# Patient Record
Sex: MALE | Race: WHITE | Employment: OTHER | URBAN - METROPOLITAN AREA
[De-identification: names, ages, dates, MRNs, and addresses within clinical notes are randomized per-mention and may not be internally consistent; named-entity substitution may affect disease eponyms.]

---

## 2017-03-13 ENCOUNTER — TELEPHONE (OUTPATIENT)
Dept: INTERNAL MEDICINE CLINIC | Age: 62
End: 2017-03-13

## 2017-03-14 ENCOUNTER — OFFICE VISIT (OUTPATIENT)
Dept: INTERNAL MEDICINE CLINIC | Age: 62
End: 2017-03-14

## 2017-03-14 VITALS
WEIGHT: 199.8 LBS | BODY MASS INDEX: 28.67 KG/M2 | DIASTOLIC BLOOD PRESSURE: 78 MMHG | HEART RATE: 78 BPM | SYSTOLIC BLOOD PRESSURE: 122 MMHG

## 2017-03-14 DIAGNOSIS — B20 HIV DISEASE (HCC): ICD-10-CM

## 2017-03-14 DIAGNOSIS — J44.9 CHRONIC OBSTRUCTIVE PULMONARY DISEASE, UNSPECIFIED COPD TYPE (HCC): ICD-10-CM

## 2017-03-14 DIAGNOSIS — G63 NEUROPATHY DUE TO HIV (HCC): Primary | ICD-10-CM

## 2017-03-14 DIAGNOSIS — B20 NEUROPATHY DUE TO HIV (HCC): Primary | ICD-10-CM

## 2017-03-14 PROCEDURE — 99213 OFFICE O/P EST LOW 20 MIN: CPT | Performed by: INTERNAL MEDICINE

## 2017-03-14 ASSESSMENT — ENCOUNTER SYMPTOMS
CONSTIPATION: 0
COUGH: 0
DIARRHEA: 0
EYE PAIN: 0
SHORTNESS OF BREATH: 0
BACK PAIN: 0
WHEEZING: 0
SORE THROAT: 0
ABDOMINAL PAIN: 0

## 2017-05-09 ENCOUNTER — OFFICE VISIT (OUTPATIENT)
Dept: FAMILY MEDICINE CLINIC | Age: 62
End: 2017-05-09

## 2017-05-09 VITALS
WEIGHT: 181.6 LBS | HEART RATE: 100 BPM | SYSTOLIC BLOOD PRESSURE: 128 MMHG | HEIGHT: 67 IN | OXYGEN SATURATION: 98 % | BODY MASS INDEX: 28.5 KG/M2 | DIASTOLIC BLOOD PRESSURE: 78 MMHG

## 2017-05-09 DIAGNOSIS — R44.0 AUDITORY HALLUCINATIONS: Primary | ICD-10-CM

## 2017-05-09 DIAGNOSIS — Z13.31 POSITIVE DEPRESSION SCREENING: ICD-10-CM

## 2017-05-09 DIAGNOSIS — R45.851 SUICIDAL THOUGHTS: ICD-10-CM

## 2017-05-09 PROCEDURE — G0444 DEPRESSION SCREEN ANNUAL: HCPCS | Performed by: FAMILY MEDICINE

## 2017-05-09 PROCEDURE — 99214 OFFICE O/P EST MOD 30 MIN: CPT | Performed by: FAMILY MEDICINE

## 2017-05-09 PROCEDURE — G8431 POS CLIN DEPRES SCRN F/U DOC: HCPCS | Performed by: FAMILY MEDICINE

## 2017-05-09 ASSESSMENT — PATIENT HEALTH QUESTIONNAIRE - PHQ9
4. FEELING TIRED OR HAVING LITTLE ENERGY: 3
1. LITTLE INTEREST OR PLEASURE IN DOING THINGS: 0
6. FEELING BAD ABOUT YOURSELF - OR THAT YOU ARE A FAILURE OR HAVE LET YOURSELF OR YOUR FAMILY DOWN: 3
SUM OF ALL RESPONSES TO PHQ9 QUESTIONS 1 & 2: 3
SUM OF ALL RESPONSES TO PHQ QUESTIONS 1-9: 17
7. TROUBLE CONCENTRATING ON THINGS, SUCH AS READING THE NEWSPAPER OR WATCHING TELEVISION: 0
3. TROUBLE FALLING OR STAYING ASLEEP: 3
10. IF YOU CHECKED OFF ANY PROBLEMS, HOW DIFFICULT HAVE THESE PROBLEMS MADE IT FOR YOU TO DO YOUR WORK, TAKE CARE OF THINGS AT HOME, OR GET ALONG WITH OTHER PEOPLE: 2
2. FEELING DOWN, DEPRESSED OR HOPELESS: 3
5. POOR APPETITE OR OVEREATING: 3
8. MOVING OR SPEAKING SO SLOWLY THAT OTHER PEOPLE COULD HAVE NOTICED. OR THE OPPOSITE, BEING SO FIGETY OR RESTLESS THAT YOU HAVE BEEN MOVING AROUND A LOT MORE THAN USUAL: 0
9. THOUGHTS THAT YOU WOULD BE BETTER OFF DEAD, OR OF HURTING YOURSELF: 2

## 2017-05-10 ASSESSMENT — ENCOUNTER SYMPTOMS
SHORTNESS OF BREATH: 0
SINUS PRESSURE: 0
SORE THROAT: 0
EYE DISCHARGE: 0
COUGH: 0
NAUSEA: 0
BLOOD IN STOOL: 0
VOMITING: 0
DIARRHEA: 0
BACK PAIN: 0

## 2017-05-24 ENCOUNTER — OFFICE VISIT (OUTPATIENT)
Dept: INTERNAL MEDICINE CLINIC | Age: 62
End: 2017-05-24

## 2017-05-24 VITALS
SYSTOLIC BLOOD PRESSURE: 122 MMHG | RESPIRATION RATE: 16 BRPM | OXYGEN SATURATION: 99 % | WEIGHT: 191 LBS | BODY MASS INDEX: 30.83 KG/M2 | DIASTOLIC BLOOD PRESSURE: 78 MMHG | HEART RATE: 92 BPM

## 2017-05-24 DIAGNOSIS — F33.41 RECURRENT MAJOR DEPRESSIVE DISORDER, IN PARTIAL REMISSION (HCC): Primary | ICD-10-CM

## 2017-05-24 DIAGNOSIS — G62.9 NEUROPATHY: ICD-10-CM

## 2017-05-24 DIAGNOSIS — J44.9 CHRONIC OBSTRUCTIVE PULMONARY DISEASE, UNSPECIFIED COPD TYPE (HCC): ICD-10-CM

## 2017-05-24 DIAGNOSIS — B20 HIV DISEASE (HCC): ICD-10-CM

## 2017-05-24 PROCEDURE — 99214 OFFICE O/P EST MOD 30 MIN: CPT | Performed by: INTERNAL MEDICINE

## 2017-05-24 RX ORDER — MIRTAZAPINE 15 MG/1
15 TABLET, FILM COATED ORAL NIGHTLY
COMMUNITY
End: 2017-06-07 | Stop reason: SDUPTHER

## 2017-05-24 ASSESSMENT — ENCOUNTER SYMPTOMS
DIARRHEA: 0
BACK PAIN: 0
SORE THROAT: 0
WHEEZING: 0
ABDOMINAL PAIN: 0
CONSTIPATION: 0
SHORTNESS OF BREATH: 0
EYE PAIN: 0
COUGH: 0

## 2017-05-25 ENCOUNTER — TELEPHONE (OUTPATIENT)
Dept: INTERNAL MEDICINE CLINIC | Age: 62
End: 2017-05-25

## 2017-05-25 RX ORDER — GABAPENTIN 300 MG/1
300 CAPSULE ORAL 3 TIMES DAILY
Qty: 90 CAPSULE | Refills: 3 | Status: SHIPPED | OUTPATIENT
Start: 2017-05-25 | End: 2017-10-13

## 2017-06-07 ENCOUNTER — TELEPHONE (OUTPATIENT)
Dept: INTERNAL MEDICINE CLINIC | Age: 62
End: 2017-06-07

## 2017-06-07 RX ORDER — MIRTAZAPINE 15 MG/1
15 TABLET, FILM COATED ORAL NIGHTLY
Qty: 30 TABLET | Refills: 5 | Status: SHIPPED | OUTPATIENT
Start: 2017-06-07 | End: 2017-10-13

## 2017-07-13 ENCOUNTER — TELEPHONE (OUTPATIENT)
Dept: INTERNAL MEDICINE CLINIC | Age: 62
End: 2017-07-13

## 2017-07-14 ENCOUNTER — OFFICE VISIT (OUTPATIENT)
Dept: INTERNAL MEDICINE CLINIC | Age: 62
End: 2017-07-14

## 2017-07-14 VITALS
BODY MASS INDEX: 27.32 KG/M2 | DIASTOLIC BLOOD PRESSURE: 80 MMHG | HEART RATE: 87 BPM | SYSTOLIC BLOOD PRESSURE: 120 MMHG | WEIGHT: 190.4 LBS

## 2017-07-14 DIAGNOSIS — G62.9 NEUROPATHY: Primary | ICD-10-CM

## 2017-07-14 DIAGNOSIS — B20 HIV (HUMAN IMMUNODEFICIENCY VIRUS INFECTION) (HCC): ICD-10-CM

## 2017-07-14 DIAGNOSIS — J44.9 CHRONIC OBSTRUCTIVE PULMONARY DISEASE, UNSPECIFIED COPD TYPE (HCC): ICD-10-CM

## 2017-07-14 DIAGNOSIS — B20 HIV DISEASE (HCC): ICD-10-CM

## 2017-07-14 DIAGNOSIS — E78.2 MIXED HYPERLIPIDEMIA: ICD-10-CM

## 2017-07-14 DIAGNOSIS — K21.9 GASTROESOPHAGEAL REFLUX DISEASE WITHOUT ESOPHAGITIS: ICD-10-CM

## 2017-07-14 PROCEDURE — 99213 OFFICE O/P EST LOW 20 MIN: CPT | Performed by: INTERNAL MEDICINE

## 2017-07-14 RX ORDER — AMITRIPTYLINE HYDROCHLORIDE 25 MG/1
25 TABLET, FILM COATED ORAL NIGHTLY PRN
Qty: 90 TABLET | Refills: 1 | Status: SHIPPED | OUTPATIENT
Start: 2017-07-14 | End: 2019-05-07

## 2017-07-14 ASSESSMENT — ENCOUNTER SYMPTOMS
CONSTIPATION: 0
ABDOMINAL PAIN: 0
EYE PAIN: 0
SHORTNESS OF BREATH: 0
COUGH: 0
SORE THROAT: 0
WHEEZING: 0
BACK PAIN: 0
DIARRHEA: 0

## 2017-10-12 ENCOUNTER — TELEPHONE (OUTPATIENT)
Dept: INTERNAL MEDICINE CLINIC | Age: 62
End: 2017-10-12

## 2017-10-13 ENCOUNTER — OFFICE VISIT (OUTPATIENT)
Dept: INTERNAL MEDICINE CLINIC | Age: 62
End: 2017-10-13

## 2017-10-13 VITALS
RESPIRATION RATE: 16 BRPM | DIASTOLIC BLOOD PRESSURE: 76 MMHG | BODY MASS INDEX: 27.84 KG/M2 | SYSTOLIC BLOOD PRESSURE: 112 MMHG | HEART RATE: 98 BPM | WEIGHT: 194 LBS | OXYGEN SATURATION: 96 %

## 2017-10-13 DIAGNOSIS — E55.9 VITAMIN D DEFICIENCY: ICD-10-CM

## 2017-10-13 DIAGNOSIS — B20 HIV (HUMAN IMMUNODEFICIENCY VIRUS INFECTION) (HCC): ICD-10-CM

## 2017-10-13 DIAGNOSIS — Z23 NEED FOR INFLUENZA VACCINATION: ICD-10-CM

## 2017-10-13 DIAGNOSIS — F33.41 RECURRENT MAJOR DEPRESSIVE DISORDER, IN PARTIAL REMISSION (HCC): Primary | ICD-10-CM

## 2017-10-13 DIAGNOSIS — Z13.220 LIPID SCREENING: ICD-10-CM

## 2017-10-13 DIAGNOSIS — K21.9 GASTROESOPHAGEAL REFLUX DISEASE WITHOUT ESOPHAGITIS: ICD-10-CM

## 2017-10-13 PROCEDURE — 90630 INFLUENZA, QUADV, 18-64 YRS, ID, PF, MICRO INJ, 0.1ML (FLUZONE QUADV, PF): CPT | Performed by: INTERNAL MEDICINE

## 2017-10-13 PROCEDURE — G0008 ADMIN INFLUENZA VIRUS VAC: HCPCS | Performed by: INTERNAL MEDICINE

## 2017-10-13 PROCEDURE — 99213 OFFICE O/P EST LOW 20 MIN: CPT | Performed by: INTERNAL MEDICINE

## 2017-10-13 RX ORDER — MIRTAZAPINE 30 MG/1
30 TABLET, FILM COATED ORAL
COMMUNITY
End: 2019-05-07

## 2017-10-13 ASSESSMENT — ENCOUNTER SYMPTOMS
BACK PAIN: 0
EYE PAIN: 0
COUGH: 0
DIARRHEA: 0
WHEEZING: 0
ABDOMINAL PAIN: 0
SORE THROAT: 0
CONSTIPATION: 0
SHORTNESS OF BREATH: 0

## 2017-10-13 NOTE — PROGRESS NOTES
hematuria. Musculoskeletal: Negative for back pain and neck pain. Skin: Negative for rash. Neurological: Negative for dizziness, weakness, numbness and headaches. Psychiatric/Behavioral: Positive for dysphoric mood. Negative for sleep disturbance. The patient is not nervous/anxious. Current Outpatient Prescriptions   Medication Sig Dispense Refill    amitriptyline (ELAVIL) 25 MG tablet Take 1 tablet by mouth nightly as needed for Sleep 90 tablet 1    albuterol-ipratropium (COMBIVENT RESPIMAT)  MCG/ACT AERS inhaler Inhale 1 puff into the lungs every 6 hours as needed for Wheezing 2 Inhaler 6    theophylline (UNIPHYL) 400 MG extended release tablet Take one half tablet by mouth twice daily 90 tablet 3    Abacavir-Dolutegravir-Lamivud (TRIUMEQ) 600- MG TABS Take 1 tablet by mouth      aspirin 81 MG chewable tablet Take 1 tablet by mouth daily 90 tablet 1    budesonide (PULMICORT FLEXHALER) 180 MCG/ACT AEPB inhaler Inhale 2 puffs into the lungs 2 times daily.  calcium-vitamin D (OSCAL-500) 500-200 MG-UNIT per tablet Take 1 tablet by mouth daily       albuterol sulfate  (90 BASE) MCG/ACT inhaler Inhale 2 puffs into the lungs every 4 hours as needed for Wheezing      Respiratory Therapy Supplies (NEBULIZER/TUBING/MOUTHPIECE) KIT 1 kit by Does not apply route daily as needed. Dx copd 496 1 kit 0    zolpidem (AMBIEN) 10 MG tablet Take 10 mg by mouth nightly as needed.  mirtazapine (REMERON) 30 MG tablet Take 30 mg by mouth       No current facility-administered medications for this visit.            Objective:  /76   Pulse 98   Resp 16   Wt 194 lb (88 kg)   SpO2 96%   BMI 27.84 kg/m²   BP Readings from Last 3 Encounters:   10/13/17 112/76   09/05/17 136/84   07/14/17 120/80     Wt Readings from Last 3 Encounters:   10/13/17 194 lb (88 kg)   09/05/17 185 lb (83.9 kg)   07/14/17 190 lb 6.4 oz (86.4 kg)         Physical Exam   Constitutional: He is oriented to disease without esophagitis    4. Lipid screening    5. Vitamin D deficiency    6. Need for influenza vaccination        PLAN:  1. Medications reviewed and reconciled. He is compliant and tolerating the medications without any side effects. Necessary refills provided. 2.  See orders. 3.  Continue follow-up with pulmonary and infectious disease. Orders Placed This Encounter   Procedures    INFLUENZA, QUADV, 18-64 YRS, ID, PF, MICRO INJ, 0.1ML (FLUZONE QUADV, PF)    Lipid Panel       Care discussed with patient. Questions answered. Patient verbalizes understanding and agrees with plan. After visit summary provided. Advised to call for any problems, questions, or concerns. Return in about 2 months (around 12/13/2017). This note was partially completed with a verbal recognition program and it was checked for errors. It is possible that there are still dictated errors within this office note. Any errors should be brought immediately to my attention for correction. All efforts were made to ensure that this office note is accurate.        Signed:  Megan Vargas MD  10/13/17  3:42 PM

## 2017-10-14 LAB
CHOLESTEROL, TOTAL: 169 MG/DL
CHOLESTEROL/HDL RATIO: NORMAL
HDLC SERPL-MCNC: 50 MG/DL (ref 35–70)
LDL CHOLESTEROL CALCULATED: 94 MG/DL (ref 0–160)
TRIGL SERPL-MCNC: 127 MG/DL
VLDLC SERPL CALC-MCNC: 25 MG/DL

## 2017-11-03 DIAGNOSIS — E78.2 MIXED HYPERLIPIDEMIA: ICD-10-CM

## 2018-05-23 ENCOUNTER — HOSPITAL ENCOUNTER (OUTPATIENT)
Dept: GENERAL RADIOLOGY | Age: 63
Discharge: HOME | End: 2018-05-23
Attending: INTERNAL MEDICINE | Admitting: INTERNAL MEDICINE

## 2018-05-23 DIAGNOSIS — J43.2 CENTRILOBULAR EMPHYSEMA (HCC): ICD-10-CM

## 2018-09-19 ENCOUNTER — OFFICE VISIT (OUTPATIENT)
Dept: INFECTIOUS DISEASES | Age: 63
End: 2018-09-19

## 2018-09-19 VITALS
BODY MASS INDEX: 29.46 KG/M2 | WEIGHT: 205.8 LBS | RESPIRATION RATE: 12 BRPM | TEMPERATURE: 97.9 F | HEART RATE: 84 BPM | DIASTOLIC BLOOD PRESSURE: 88 MMHG | SYSTOLIC BLOOD PRESSURE: 137 MMHG | HEIGHT: 70 IN

## 2018-09-19 DIAGNOSIS — B20 HIV (HUMAN IMMUNODEFICIENCY VIRUS INFECTION) (HCC): Primary | ICD-10-CM

## 2018-09-19 PROCEDURE — 99204 OFFICE O/P NEW MOD 45 MIN: CPT | Performed by: INTERNAL MEDICINE

## 2018-09-19 ASSESSMENT — PATIENT HEALTH QUESTIONNAIRE - PHQ9
SUM OF ALL RESPONSES TO PHQ QUESTIONS 1-9: 0
SUM OF ALL RESPONSES TO PHQ9 QUESTIONS 1 & 2: 0
2. FEELING DOWN, DEPRESSED OR HOPELESS: 0
1. LITTLE INTEREST OR PLEASURE IN DOING THINGS: 0
SUM OF ALL RESPONSES TO PHQ QUESTIONS 1-9: 0

## 2018-09-19 ASSESSMENT — ENCOUNTER SYMPTOMS
COUGH: 1
SHORTNESS OF BREATH: 1
EYES NEGATIVE: 1
GASTROINTESTINAL NEGATIVE: 1

## 2018-09-19 NOTE — PROGRESS NOTES
Respiratory: Positive for cough and shortness of breath (due to COPD, improved with inhalers). Cardiovascular: Negative. Gastrointestinal: Negative. Genitourinary: Negative. Musculoskeletal: Negative. Skin: Negative. Patient Active Problem List    Diagnosis Date Noted    Recurrent major depressive disorder, in partial remission ([facility] 10/13/2017    Pharyngoesophageal dysphagia     Gastroesophageal reflux disease without esophagitis     Exertional dyspnea 08/28/2016    Dizziness 08/28/2016    HIV (human immunodeficiency virus infection) ([facility] 08/04/2016     Overview:   · HIV Diagnosed (When/Where):  [facility]  · HIV Complications: None  · CD4 Arturo:  195  · Current ART (Started when): Atripla (2005)  · Previous ART: NA  · Known Resistance:  None      Vitamin D deficiency 01/28/2016    Insomnia 01/28/2016    Former smoker 01/28/2016    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 ([facility] 01/28/2016    Chronic bronchitis ([place] [address] 12/10/2013       Current Outpatient Prescriptions   Medication Sig Dispense Refill    albuterol-ipratropium (COMBIVENT RESPIMAT)  MCG/ACT AERS inhaler Inhale 1 puff into the lungs every 6 hours as needed for Wheezing 2 Inhaler 6    theophylline (UNIPHYL) 400 MG extended release tablet Take one half tablet by mouth twice daily 90 tablet 3    mirtazapine (REMERON) 30 MG tablet Take 30 mg by mouth      amitriptyline (ELAVIL) 25 MG tablet Take 1 tablet by mouth nightly as needed for Sleep 90 tablet 1    Abacavir-Dolutegravir-Lamivud (TRIUMEQ) 600- MG TABS Take 1 tablet by mouth      aspirin 81 MG chewable tablet Take 1 tablet by mouth daily 90 tablet 1    budesonide (PULMICORT FLEXHALER) 180 MCG/ACT AEPB inhaler Inhale 2 puffs into the lungs 2 times daily.  calcium-vitamin D (OSCAL-500) 500-200 MG-UNIT per tablet Take 1 tablet by mouth daily       Respiratory Therapy Supplies (NEBULIZER/TUBING/MOUTHPIECE) KIT 1 kit by Does not apply route daily as needed.  Dx

## 2019-01-03 ENCOUNTER — HOSPITAL ENCOUNTER (OUTPATIENT)
Age: 64
Discharge: HOME | End: 2019-01-03
Payer: COMMERCIAL

## 2019-01-03 ENCOUNTER — OFFICE VISIT (OUTPATIENT)
Dept: INFECTIOUS DISEASES | Age: 64
End: 2019-01-03
Payer: COMMERCIAL

## 2019-01-03 ENCOUNTER — HOSPITAL ENCOUNTER (OUTPATIENT)
Dept: GENERAL RADIOLOGY | Age: 64
Discharge: HOME | End: 2019-01-03
Payer: COMMERCIAL

## 2019-01-03 VITALS
WEIGHT: 201.4 LBS | BODY MASS INDEX: 28.9 KG/M2 | HEART RATE: 79 BPM | DIASTOLIC BLOOD PRESSURE: 87 MMHG | TEMPERATURE: 97.7 F | RESPIRATION RATE: 14 BRPM | SYSTOLIC BLOOD PRESSURE: 133 MMHG

## 2019-01-03 DIAGNOSIS — B20 HIV (HUMAN IMMUNODEFICIENCY VIRUS INFECTION) (HCC): ICD-10-CM

## 2019-01-03 DIAGNOSIS — M85.89 OSTEOPENIA OF MULTIPLE SITES: ICD-10-CM

## 2019-01-03 DIAGNOSIS — J42 CHRONIC BRONCHITIS, UNSPECIFIED CHRONIC BRONCHITIS TYPE (HCC): ICD-10-CM

## 2019-01-03 DIAGNOSIS — J42 CHRONIC BRONCHITIS, UNSPECIFIED CHRONIC BRONCHITIS TYPE (HCC): Primary | ICD-10-CM

## 2019-01-03 PROCEDURE — 71046 X-RAY EXAM CHEST 2 VIEWS: CPT

## 2019-01-03 PROCEDURE — 99215 OFFICE O/P EST HI 40 MIN: CPT | Performed by: INTERNAL MEDICINE

## 2019-01-03 ASSESSMENT — ENCOUNTER SYMPTOMS
GASTROINTESTINAL NEGATIVE: 1
EYES NEGATIVE: 1
SHORTNESS OF BREATH: 1
COUGH: 1

## 2019-01-07 ENCOUNTER — HOSPITAL ENCOUNTER (OUTPATIENT)
Dept: WOMENS IMAGING | Age: 64
Discharge: HOME | End: 2019-01-07
Payer: COMMERCIAL

## 2019-01-07 DIAGNOSIS — M85.89 OSTEOPENIA OF MULTIPLE SITES: ICD-10-CM

## 2019-01-07 PROCEDURE — 77080 DXA BONE DENSITY AXIAL: CPT

## 2019-07-03 ENCOUNTER — OFFICE VISIT (OUTPATIENT)
Dept: INFECTIOUS DISEASES | Age: 64
End: 2019-07-03
Payer: COMMERCIAL

## 2019-07-03 VITALS
RESPIRATION RATE: 14 BRPM | WEIGHT: 197.6 LBS | HEART RATE: 89 BPM | BODY MASS INDEX: 28.35 KG/M2 | DIASTOLIC BLOOD PRESSURE: 88 MMHG | SYSTOLIC BLOOD PRESSURE: 134 MMHG | TEMPERATURE: 97.8 F

## 2019-07-03 DIAGNOSIS — Z21 ASYMPTOMATIC HIV INFECTION (HCC): Primary | ICD-10-CM

## 2019-07-03 PROCEDURE — 99214 OFFICE O/P EST MOD 30 MIN: CPT | Performed by: INTERNAL MEDICINE

## 2019-07-03 ASSESSMENT — ENCOUNTER SYMPTOMS
GASTROINTESTINAL NEGATIVE: 1
EYES NEGATIVE: 1
SHORTNESS OF BREATH: 1
COUGH: 1

## 2019-07-03 ASSESSMENT — PATIENT HEALTH QUESTIONNAIRE - PHQ9
SUM OF ALL RESPONSES TO PHQ9 QUESTIONS 1 & 2: 0
SUM OF ALL RESPONSES TO PHQ QUESTIONS 1-9: 0
SUM OF ALL RESPONSES TO PHQ QUESTIONS 1-9: 0
1. LITTLE INTEREST OR PLEASURE IN DOING THINGS: 0
2. FEELING DOWN, DEPRESSED OR HOPELESS: 0

## 2019-07-03 NOTE — PROGRESS NOTES
Eyes: Negative. Respiratory: Positive for cough and shortness of breath (due to COPD, improved with inhalers). Cardiovascular: Negative. Gastrointestinal: Negative. Genitourinary: Negative. Musculoskeletal: Negative. Skin: Negative. Patient Active Problem List    Diagnosis Date Noted    Osteopenia of multiple sites 01/03/2019    Recurrent major depressive disorder, in partial remission ([facility] 10/13/2017    Pharyngoesophageal dysphagia     Gastroesophageal reflux disease without esophagitis     Exertional dyspnea 08/28/2016    Dizziness 08/28/2016    HIV (human immunodeficiency virus infection) ([facility] 08/04/2016     Overview:   · HIV Diagnosed (When/Where):  [facility]  · HIV Complications: None  · CD4 Arturo:  195  · Current ART (Started when): Atripla (2005)  · Previous ART: NA  · Known Resistance:  None      Vitamin D deficiency 01/28/2016    Insomnia 01/28/2016    Former smoker 01/28/2016    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 ([facility] 01/28/2016    Chronic bronchitis ([place] [address] 12/10/2013       Current Outpatient Medications   Medication Sig Dispense Refill    Abacavir-Dolutegravir-Lamivud (TRIUMEQ) 600- MG TABS Take 1 tablet by mouth daily 30 tablet 5    theophylline (UNIPHYL) 400 MG extended release tablet Take one half tablet by mouth twice daily 90 tablet 3    albuterol-ipratropium (COMBIVENT RESPIMAT)  MCG/ACT AERS inhaler Inhale 1 puff into the lungs every 6 hours as needed for Wheezing 1 Inhaler 6    budesonide (PULMICORT FLEXHALER) 180 MCG/ACT AEPB inhaler Inhale 2 puffs into the lungs 2 times daily. 1 each 3    aspirin 81 MG chewable tablet Take 1 tablet by mouth daily 90 tablet 1    albuterol sulfate  (90 BASE) MCG/ACT inhaler Inhale 2 puffs into the lungs every 4 hours as needed for Wheezing      Respiratory Therapy Supplies (NEBULIZER/TUBING/MOUTHPIECE) KIT 1 kit by Does not apply route daily as needed.  Dx copd 496 1 kit 0    zolpidem (AMBIEN) 10 MG

## 2020-01-08 ENCOUNTER — OFFICE VISIT (OUTPATIENT)
Dept: INFECTIOUS DISEASES | Age: 65
End: 2020-01-08
Payer: COMMERCIAL

## 2020-01-08 VITALS
SYSTOLIC BLOOD PRESSURE: 122 MMHG | BODY MASS INDEX: 29.3 KG/M2 | RESPIRATION RATE: 14 BRPM | HEART RATE: 78 BPM | WEIGHT: 198.4 LBS | DIASTOLIC BLOOD PRESSURE: 68 MMHG

## 2020-01-08 PROCEDURE — 99214 OFFICE O/P EST MOD 30 MIN: CPT | Performed by: INTERNAL MEDICINE

## 2020-01-08 ASSESSMENT — ENCOUNTER SYMPTOMS
COUGH: 1
EYES NEGATIVE: 1
SHORTNESS OF BREATH: 1
GASTROINTESTINAL NEGATIVE: 1

## 2020-01-08 ASSESSMENT — PATIENT HEALTH QUESTIONNAIRE - PHQ9
1. LITTLE INTEREST OR PLEASURE IN DOING THINGS: 0
SUM OF ALL RESPONSES TO PHQ QUESTIONS 1-9: 0
SUM OF ALL RESPONSES TO PHQ9 QUESTIONS 1 & 2: 0
2. FEELING DOWN, DEPRESSED OR HOPELESS: 0
SUM OF ALL RESPONSES TO PHQ QUESTIONS 1-9: 0

## 2020-01-08 NOTE — PROGRESS NOTES
copd 496 1 kit 0    zolpidem (AMBIEN) 10 MG tablet Take 10 mg by mouth nightly as needed. No current facility-administered medications for this visit.         Patient Active Problem List   Diagnosis    Chronic bronchitis (HCC)    Vitamin D deficiency    Insomnia    Former smoker    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    Exertional dyspnea    Dizziness    HIV (human immunodeficiency virus infection) ([facility]    Pharyngoesophageal dysphagia    Gastroesophageal reflux disease without esophagitis    Recurrent major depressive disorder, in partial remission ([facility]    Osteopenia of multiple sites       Past Medical History:   Diagnosis Date    COPD (chronic obstructive pulmonary disease) ([facility]     Emphysema     GERD (gastroesophageal reflux disease)     HIV disease ([facility]     dx 8 yrs ago    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 ([facility]     Tracheal mass     Nothing found on [facility]       Past Surgical History:   Procedure Laterality Date    COLONOSCOPY  10 + yrs ago    COLONOSCOPY  5/21/15    polyp, int hem, biopsy    DENTAL SURGERY  20+ yrs ago    ENDOSCOPY, COLON, DIAGNOSTIC  5/21/15    egd bx, mild esophagitis, esoph ring, dil, hiatal hernia    HERNIA REPAIR  x2    10 + yrs agoand 39 yrs ago, right ing hernia repair    TONSILLECTOMY  45 yrs ago       Social History     Socioeconomic History    Marital status: Single     Spouse name: Not on file    Number of children: Not on file    Years of education: Not on file    Highest education level: Not on file   Occupational History    Occupation: retired/   Social Needs    Financial resource strain: Not on file    Food insecurity:     Worry: Not on file     Inability: Not on file    Transportation needs:     Medical: Not on file     Non-medical: Not on file   Tobacco Use    Smoking status: Former Smoker     Packs/day: 1.00     Years: 32.00     Pack years: 32.00     Last attempt to quit: 4/22/2004     Years since quitting: 15.7    Smokeless tobacco: Never Used   Substance and Sexual Activity    Alcohol use: No    Drug use: No    Sexual activity: Not Currently     Partners: Female, Male   Lifestyle    Physical activity:     Days per week: Not on file     Minutes per session: Not on file    Stress: Not on file   Relationships    Social connections:     Talks on phone: Not on file     Gets together: Not on file     Attends Yazidism service: Not on file     Active member of club or organization: Not on file     Attends meetings of clubs or organizations: Not on file     Relationship status: Not on file    Intimate partner violence:     Fear of current or ex partner: Not on file     Emotionally abused: Not on file     Physically abused: Not on file     Forced sexual activity: Not on file   Other Topics Concern    Not on file   Social History Narrative    Not on file       Family History   Problem Relation Age of Onset    Cancer Mother         breast ca    Heart Disease Father     High Cholesterol Sister     Cancer Maternal Uncle         Melanoma    Cancer Maternal Grandmother         Lung (Smoker)    Heart Disease Maternal Grandmother     Cancer Maternal Grandfather         Leukemia    Heart Disease Maternal Grandfather     Heart Disease Paternal Grandmother     Heart Disease Paternal Grandfather        Most recent labs reviewed with patient    CD4 / T Cells:   No components found for: CD4H  HIV Viral Load:  No components found for: HIVRN   WBC:  Lab Results   Component Value Date    WBC 9.8 05/09/2017    WBC 11.1 09/01/2016    WBC 7.4 08/31/2016      Creatinine:  Lab Results   Component Value Date    CREATININE 1.2 05/09/2017    CREATININE 1.3 08/31/2016    CREATININE 1.2 08/30/2016     ALT:  Lab Results   Component Value Date    ALT 9 05/09/2017    ALT 11 08/29/2016    ALT 12 08/28/2016     LDL CHOL:  No components found for: LDL    Current problems and complaints as above.   Otherwise unremarkable including HEENT, Constitutional, Cardiovascular, Respiratory, GI, Musculoskeletal, Skin, Endocrine, Hemo/Lympatic, Neurologic    Vital Signs:  Vitals:    01/08/20 1246   BP: 122/68   Pulse: 78   Resp: 14   Weight: 198 lb 6.4 oz (90 kg)     Wt Readings from Last 3 Encounters:   01/08/20 198 lb 6.4 oz (90 kg)   11/07/19 192 lb 8 oz (87.3 kg)   08/07/19 198 lb (89.8 kg)      Estimated body mass index is 29.3 kg/m² as calculated from the following:    Height as of 11/7/19: 5' 9\" (1.753 m). Weight as of this encounter: 198 lb 6.4 oz (90 kg). Physical Exam:   Gen: alert, NAD  HEENT: AT/NC, no icterus, no oral lesions, extracted teeth, no gingivial disease  Neck: supple, no JVD, no cervical lymphadenopathy, trachea midline, no thyromegaly or thyroid nodules. Chest: reduced air entry bilaterally  Heart: regular rate and rhythm, no murmurs, gallop, or rub. ABD: soft, non-distended, no HSM/masses, non-tender, normoactive bowel sounds  EXT: no cyanosis, no clubbing, no splinter hemorrhages, no edema  NEURO: CN 2-12 intact, no focal deficits, normal gait  Derm: no rashes. Psych: normal affect.      Imaging Studies:   Reviewed - recent studies reviewed

## 2020-02-20 ENCOUNTER — APPOINTMENT (OUTPATIENT)
Dept: GENERAL RADIOLOGY | Age: 65
End: 2020-02-20
Payer: COMMERCIAL

## 2020-02-20 ENCOUNTER — HOSPITAL ENCOUNTER (EMERGENCY)
Age: 65
Discharge: HOME | End: 2020-02-20
Attending: EMERGENCY MEDICINE
Payer: COMMERCIAL

## 2020-02-20 VITALS
HEART RATE: 96 BPM | WEIGHT: 190 LBS | SYSTOLIC BLOOD PRESSURE: 153 MMHG | BODY MASS INDEX: 28.14 KG/M2 | HEIGHT: 69 IN | RESPIRATION RATE: 17 BRPM | TEMPERATURE: 97.5 F | OXYGEN SATURATION: 98 % | DIASTOLIC BLOOD PRESSURE: 80 MMHG

## 2020-02-20 PROCEDURE — 99283 EMERGENCY DEPT VISIT LOW MDM: CPT

## 2020-02-20 PROCEDURE — 6370000000 HC RX 637 (ALT 250 FOR IP): Performed by: PHYSICIAN ASSISTANT

## 2020-02-20 PROCEDURE — 73090 X-RAY EXAM OF FOREARM: CPT

## 2020-02-20 PROCEDURE — 2500000003 HC RX 250 WO HCPCS: Performed by: EMERGENCY MEDICINE

## 2020-02-20 PROCEDURE — 96375 TX/PRO/DX INJ NEW DRUG ADDON: CPT

## 2020-02-20 PROCEDURE — 96374 THER/PROPH/DIAG INJ IV PUSH: CPT

## 2020-02-20 PROCEDURE — 73110 X-RAY EXAM OF WRIST: CPT

## 2020-02-20 PROCEDURE — 96361 HYDRATE IV INFUSION ADD-ON: CPT

## 2020-02-20 PROCEDURE — 6360000002 HC RX W HCPCS: Performed by: EMERGENCY MEDICINE

## 2020-02-20 PROCEDURE — 4500000029 HC MAJOR PROCEDURE

## 2020-02-20 PROCEDURE — 2580000003 HC RX 258: Performed by: EMERGENCY MEDICINE

## 2020-02-20 RX ORDER — OXYCODONE HYDROCHLORIDE AND ACETAMINOPHEN 5; 325 MG/1; MG/1
1 TABLET ORAL ONCE
Status: COMPLETED | OUTPATIENT
Start: 2020-02-20 | End: 2020-02-20

## 2020-02-20 RX ORDER — KETAMINE HYDROCHLORIDE 10 MG/ML
200 INJECTION, SOLUTION INTRAMUSCULAR; INTRAVENOUS ONCE
Status: COMPLETED | OUTPATIENT
Start: 2020-02-20 | End: 2020-02-20

## 2020-02-20 RX ORDER — 0.9 % SODIUM CHLORIDE 0.9 %
1000 INTRAVENOUS SOLUTION INTRAVENOUS ONCE
Status: COMPLETED | OUTPATIENT
Start: 2020-02-20 | End: 2020-02-20

## 2020-02-20 RX ORDER — ONDANSETRON 2 MG/ML
4 INJECTION INTRAMUSCULAR; INTRAVENOUS EVERY 6 HOURS PRN
Status: DISCONTINUED | OUTPATIENT
Start: 2020-02-20 | End: 2020-02-21 | Stop reason: HOSPADM

## 2020-02-20 RX ORDER — FENTANYL CITRATE 50 UG/ML
100 INJECTION, SOLUTION INTRAMUSCULAR; INTRAVENOUS ONCE
Status: COMPLETED | OUTPATIENT
Start: 2020-02-20 | End: 2020-02-20

## 2020-02-20 RX ORDER — HYDROCODONE BITARTRATE AND ACETAMINOPHEN 5; 325 MG/1; MG/1
2 TABLET ORAL ONCE
Status: COMPLETED | OUTPATIENT
Start: 2020-02-20 | End: 2020-02-20

## 2020-02-20 RX ORDER — DIAPER,BRIEF,INFANT-TODD,DISP
EACH MISCELLANEOUS ONCE
Status: COMPLETED | OUTPATIENT
Start: 2020-02-20 | End: 2020-02-20

## 2020-02-20 RX ORDER — OXYCODONE HYDROCHLORIDE AND ACETAMINOPHEN 5; 325 MG/1; MG/1
1 TABLET ORAL EVERY 6 HOURS PRN
Qty: 20 TABLET | Refills: 0 | Status: SHIPPED | OUTPATIENT
Start: 2020-02-20 | End: 2020-02-25

## 2020-02-20 RX ADMIN — ONDANSETRON 4 MG: 2 INJECTION INTRAMUSCULAR; INTRAVENOUS at 19:45

## 2020-02-20 RX ADMIN — OXYCODONE HYDROCHLORIDE AND ACETAMINOPHEN 1 TABLET: 5; 325 TABLET ORAL at 22:24

## 2020-02-20 RX ADMIN — HYDROCODONE BITARTRATE AND ACETAMINOPHEN 2 TABLET: 5; 325 TABLET ORAL at 18:10

## 2020-02-20 RX ADMIN — FENTANYL CITRATE 100 MCG: 50 INJECTION, SOLUTION INTRAMUSCULAR; INTRAVENOUS at 19:45

## 2020-02-20 RX ADMIN — BACITRACIN ZINC 1 G: 500 OINTMENT TOPICAL at 19:45

## 2020-02-20 RX ADMIN — SODIUM CHLORIDE 1000 ML: 9 INJECTION, SOLUTION INTRAVENOUS at 19:53

## 2020-02-20 RX ADMIN — KETAMINE HYDROCHLORIDE 60 MG: 10 INJECTION, SOLUTION INTRAMUSCULAR; INTRAVENOUS at 19:53

## 2020-02-20 ASSESSMENT — PAIN DESCRIPTION - PROGRESSION: CLINICAL_PROGRESSION: GRADUALLY WORSENING

## 2020-02-20 ASSESSMENT — PAIN DESCRIPTION - PAIN TYPE
TYPE: ACUTE PAIN
TYPE: ACUTE PAIN

## 2020-02-20 ASSESSMENT — PAIN SCALES - GENERAL
PAINLEVEL_OUTOF10: 6
PAINLEVEL_OUTOF10: 2
PAINLEVEL_OUTOF10: 4
PAINLEVEL_OUTOF10: 10
PAINLEVEL_OUTOF10: 4
PAINLEVEL_OUTOF10: 10

## 2020-02-20 ASSESSMENT — PAIN DESCRIPTION - LOCATION: LOCATION: WRIST

## 2020-02-20 ASSESSMENT — PAIN DESCRIPTION - FREQUENCY: FREQUENCY: CONTINUOUS

## 2020-02-20 ASSESSMENT — PAIN DESCRIPTION - ORIENTATION: ORIENTATION: LEFT

## 2020-02-20 NOTE — ED TRIAGE NOTES
Pt fell playing with dog. Did not hit his head. Left wrist swollen with small part of bone sticking out at this time. No bleeding at this time.

## 2020-02-20 NOTE — ED PROVIDER NOTES
As provider-in-triage, I performed a medical screening history and physical exam on this patient. HISTORY OF PRESENT ILLNESS  Chasity Mcfarlane is a 59 y.o. male who present to the emergency department today with left wrist injury. States that he fell while playing with his dog. No other injuries. Has an obvious deformity to the distal aspect of the wrist/forearm. There is an abrasion to the lateral aspect, no obvious open fracture. Patient is HIV positive. Marcus Car PHYSICAL EXAM  /84   Pulse 73   Temp 97.5 °F (36.4 °C) (Oral)   Resp 18   Ht 5' 9\" (1.753 m)   Wt 190 lb (86.2 kg)   SpO2 98%   BMI 28.06 kg/m²     On exam, the patient appears in no acute distress. Speech is clear. Breathing is unlabored. Moves all extremities    Comment: Please note this report has been produced using speech recognition software and may contain errors related to that system including errors in grammar, punctuation, and spelling, as well as words and phrases that may be inappropriate. If there are any questions or concerns please feel free to contact the dictating provider for clarification.         Yosi Meek 411, PA  02/20/20 7869

## 2020-02-20 NOTE — ED PROVIDER NOTES
inhaler Inhale 2 puffs into the lungs 2 times daily. 1 each 3    albuterol-ipratropium (COMBIVENT RESPIMAT)  MCG/ACT AERS inhaler Inhale 1 puff into the lungs every 6 hours as needed for Wheezing 4 g 3    theophylline (UNIPHYL) 400 MG extended release tablet Take one half tablet by mouth twice daily 90 tablet 3    Abacavir-Dolutegravir-Lamivud (TRIUMEQ) 600- MG TABS Take 1 tablet by mouth daily 30 tablet 5    aspirin 81 MG chewable tablet Take 1 tablet by mouth daily 90 tablet 1    albuterol sulfate  (90 BASE) MCG/ACT inhaler Inhale 2 puffs into the lungs every 4 hours as needed for Wheezing      Respiratory Therapy Supplies (NEBULIZER/TUBING/MOUTHPIECE) KIT 1 kit by Does not apply route daily as needed. Dx copd 496 1 kit 0    zolpidem (AMBIEN) 10 MG tablet Take 10 mg by mouth nightly as needed.          ALLERGIES    No Known Allergies    FAMILY HISTORY    Family History   Problem Relation Age of Onset    Cancer Mother         breast ca    Heart Disease Father     High Cholesterol Sister     Cancer Maternal Uncle         Melanoma    Cancer Maternal Grandmother         Lung (Smoker)    Heart Disease Maternal Grandmother     Cancer Maternal Grandfather         Leukemia    Heart Disease Maternal Grandfather     Heart Disease Paternal Grandmother     Heart Disease Paternal Grandfather        SOCIAL HISTORY    Social History     Socioeconomic History    Marital status: Single     Spouse name: None    Number of children: None    Years of education: None    Highest education level: None   Occupational History    Occupation: retired/   Social Needs    Financial resource strain: None    Food insecurity:     Worry: None     Inability: None    Transportation needs:     Medical: None     Non-medical: None   Tobacco Use    Smoking status: Former Smoker     Packs/day: 1.00     Years: 32.00     Pack years: 32.00     Last attempt to quit: 4/22/2004     Years since quitting: HISTORY: Reason for exam:->abnormality of left wrist forearm Reason for Exam: fall obvious deformity of left wrist FINDINGS: Left wrist: Three views of the left wrist were reviewed. There is an acute and severely comminuted fracture of the distal left radius with fracture centered at the radial metaphysis. Intra-articular extension of fracture lines. There is an acute displaced fracture of the ulnar styloid. Bones are osteopenic. Overlying soft tissue swelling. Left radius and ulna: Four views of the left radius and ulna were reviewed. Acute fractures of the distal radius and ulna as described above. The more proximal radius and ulna are intact. Anatomic alignment at the elbow. 1. Acute and severely comminuted intra-articular fracture of the distal radius. 2. Acute displaced fracture of the ulnar styloid. Xr Wrist Left (min 3 Views)    Result Date: 2/20/2020  EXAMINATION: 3 XRAY VIEWS OF THE LEFT WRIST 2/20/2020 8:29 pm COMPARISON: February 20, 2020 HISTORY: ORDERING SYSTEM PROVIDED HISTORY: post reduction TECHNOLOGIST PROVIDED HISTORY: Reason for exam:->post reduction Reason for Exam: post reduction Acuity: Acute Type of Exam: Initial Mechanism of Injury: fall Relevant Medical/Surgical History: na FINDINGS: Three views of the left wrist demonstrate overlying cast which obscures fine bony details. Interval improvement in alignment of a comminuted intra-articular fracture of the distal radius. Displaced ulnar styloid fracture also demonstrates improved alignment but remains displaced. 1. Status post reduction of a comminuted fracture of the distal radius and dislocated ulnar styloid fracture. Improved alignment is seen. No new fractures identified. Xr Wrist Left (min 3 Views)    Result Date: 2/20/2020  EXAMINATION: 3 XRAY VIEWS OF THE LEFT WRIST; 4 XRAY VIEWS OF THE LEFT FOREARM 2/20/2020 5:51 pm COMPARISON: None.  HISTORY: ORDERING SYSTEM PROVIDED HISTORY: left wrist pain TECHNOLOGIST PROVIDED HISTORY: Reason for exam:->left wrist pain Reason for Exam: fall; ORDERING SYSTEM PROVIDED HISTORY: abnormality of left wrist forearm TECHNOLOGIST PROVIDED HISTORY: Reason for exam:->abnormality of left wrist forearm Reason for Exam: fall obvious deformity of left wrist FINDINGS: Left wrist: Three views of the left wrist were reviewed. There is an acute and severely comminuted fracture of the distal left radius with fracture centered at the radial metaphysis. Intra-articular extension of fracture lines. There is an acute displaced fracture of the ulnar styloid. Bones are osteopenic. Overlying soft tissue swelling. Left radius and ulna: Four views of the left radius and ulna were reviewed. Acute fractures of the distal radius and ulna as described above. The more proximal radius and ulna are intact. Anatomic alignment at the elbow. 1. Acute and severely comminuted intra-articular fracture of the distal radius. 2. Acute displaced fracture of the ulnar styloid. Patient Name: Constanza Valencia   Medical Record Number: [id number]  Date: 2/21/2020   Time: 3:12 AM   Room/Bed: [place] [address]  Joint Reduction Procedure Note  Indication: Fracture    Consent: The patient was counseled regarding the procedure, it's indications, risks, potential complications and alternatives and any questions were answered. Consent was obtained. Procedure: The pre-reduction exam showed distal perfusion & neurologic function to be normal. The patient was placed in the appropriate position. Anesthesia/pain control was obtained using Ketamine--please see Dr. Kimberlee Robles note for details. Reduction of the left wrist was performed by traction and counter traction. Post reduction films were obtained and revealed satisfactory reduction. A post-reduction exam revealed distal perfusion & neurologic function to be normal. The affected area was immobilized with a reverse sugar tong splint and sling.     The patient tolerated the procedure

## 2020-02-21 NOTE — ED PROVIDER NOTES
I independently examined and evaluated Lito Shanks. In brief their history revealed right-hand-dominant 80-year-old male who patient tripped and fell while playing with his dog just prior to arrival.  Patient fell forward landing onto his left wrist with immediate pain. Patient reports that he drove himself here and reports isolated pain to left wrist that is currently severe, constant and aching. Patient does have slightly decreased sensation and strength to his fourth and fifth digit of his left hand since the fall. Patient denies any head injury. No loss of consciousness. No neck or back pains. No pain to his right arm or his bilateral legs. No chest pain or shortness of breath and abdominal pain. No prior injuries to this wrist before. Their focused exam revealed the patient is afebrile and hemodynamically stable on room air. The patient appears age appropriate, appears well-hydrated, well-nourished. Appears overall nontoxic. Very pleasant. Mucous membranes are moist. Speech is clear. Breathing is unlabored. Speaking clearly in full sentences. No respiratory distress. Skin is dry. Abrasion localized over the ulnar aspect of the wrist with no active bleeding. There is no deep laceration and no open fracture because of this. Gross deformity to left wrist with obvious edema and hematoma underlying. Limited range of motion at the left wrist secondary to pain. Patient is able to wiggle all digits. Capillary refill is present. Altered sensation to light touch to fourth and fifth digit diffusely. No tenderness in the left forearm more proximally, left elbow, left humerus left shoulder. No CTLS tenderness or step-off. Mental status is normal. The patient moves all extremities and is without facial droop. Procedure Note - Procedural Sedation: The benefits, risks, and alternatives of procedural sedation were discussed with the patient. Questions were sought and answered.  Written consent was obtained for the procedure. Oxygen was administered and the appropriate pre-procedural policies were followed. Cardiac, oxygenation and blood pressure monitoring occurred. Airway Assessment: normal    ASA Classification: Class 2 - A normal healthy patient with mild systemic disease    Prior to sedation a time out with nursing was called. Charlie Brown was given a total of 60 mg of ketamine and adequate procedural sedation was achieved. The patient tolerated the procedure without complications. Charlie Brown regained consciousness as expected and has recovered to their baseline mental status. 16 minutes of intra-service time was provided. ED course: Pt presents as above. Emergent conditions considered. Presentation prompted initial treatment with oral Norco on arrival.  X-rays demonstrating a comminuted fracture of distal radial metaphysis and ulnar styloid. Patient was consented. IV was established IV fentanyl was given. IV Zofran was given. Patient kept n.p.o. Patient was sedated as above. Fracture was reduced to the best of our ability and postreduction x-rays demonstrate improved alignment. Patient remains neurovascularly intact with improvement of pain. Patient was splinted and sugar tong splint. Care was taken to pad around patient's abrasions and dressings were applied underneath the padding. Given the comminuted nature of his fracture patient may require surgery and this was discussed. Patient was provided orthopedic surgery follow-up for further reassessment. Discussed [place] signs and symptoms on when to return to the emergency department. Discharged home. Questions sought and answered with the patient. They voice understanding and agree with plan. Instructed to return for any worsening or worrisome concerns. All diagnostic, treatment, and disposition decisions were made by myself in conjunction with the Advanced Practice Provider.     For all further details of the patient's emergency department visit, please see the Advanced Practice Provider's documentation.        Marla Denver, MD  02/21/20 0443

## 2020-02-25 ENCOUNTER — OFFICE VISIT (OUTPATIENT)
Dept: ORTHOPEDIC SURGERY | Age: 65
End: 2020-02-25
Payer: COMMERCIAL

## 2020-02-25 VITALS — BODY MASS INDEX: 29.62 KG/M2 | WEIGHT: 200 LBS | HEIGHT: 69 IN | RESPIRATION RATE: 16 BRPM

## 2020-02-25 PROCEDURE — 99202 OFFICE O/P NEW SF 15 MIN: CPT | Performed by: ORTHOPAEDIC SURGERY

## 2020-02-25 NOTE — PATIENT INSTRUCTIONS
Left wrist ORIF discussed   Will proceed with surgery   No meds list provided   Consent signed today  Follow up for pre-op testing/surgery as discussed, arrival time 11:30 a.m Friday, 28 February at [facility]      Call office with any questions Lety Graff surgery scheduler)

## 2020-02-25 NOTE — PROGRESS NOTES
ORTHOPEDIC NEW PATIENT NOTE    2020    Patient name: Emigdio Arevalo  : 1955    CHIEF COMPLAINT  Chief Complaint   Patient presents with    Fracture     left distal radius and ulnar styloid process DOI 2020, reduced in ED        HPI  The patient was seen and examined. Emigdio Arevalo is a 59 y.o. male who presents with the above chief complaint. Date of injury/Duration of symptoms: 2020  Mechanism of injury: fall in living room   Severity: 2/10     Character: constant    Emigdio Arevalo is a 59 y.o. male RHD tripped and fell in his living room back on 2020. He was seen in ER, closed reduction was performed. He then came into the office yesterday because of significant finger swelling and pain. The ACE wrap was adjusted and the pain was significantly improved. 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 Authorizing Provider   budesonide (PULMICORT FLEXHALER) 180 MCG/ACT AEPB inhaler Inhale 2 puffs into the lungs 2 times daily.  20  Yes Ramos Clark MD   albuterol-ipratropium (COMBIVENT RESPIMAT)  MCG/ACT AERS inhaler Inhale 1 puff into the lungs every 6 hours as needed for Wheezing 20  Yes Ramos Clark MD   theophylline (UNIPHYL) 400 MG extended release tablet Take one half tablet by mouth twice daily 20  Yes Ramos Clark MD   Abacavir-Dolutegravir-Lamivud (TRIUMEQ) 600- MG TABS Take 1 tablet by mouth daily 20  Yes Artis Bravo MD   albuterol sulfate  (90 BASE) MCG/ACT inhaler Inhale 2 puffs into the lungs every 4 hours as needed for Wheezing   Yes Historical Provider, MD   Respiratory Therapy Supplies (NEBULIZER/TUBING/MOUTHPIECE) KIT 1 kit by Does not apply route daily as needed. Dx copd 496 6/19/14  Yes Abbie Macario MD   zolpidem (AMBIEN) 10 MG tablet Take 10 mg by mouth nightly as needed. Yes Historical Provider, MD   oxyCODONE-acetaminophen (PERCOCET) 5-325 MG per tablet Take 1 tablet by mouth every 6 hours as needed for Pain for up to 5 days. Intended supply: 5 days.  Take lowest dose possible to manage pain 2/20/20 2/25/20  Nupur Mas PA-C   aspirin 81 MG chewable tablet Take 1 tablet by mouth daily  Patient not taking: Reported on 2/25/2020 12/12/16   Guadalupe Ibrahim MD       ALLERGIES  No Known Allergies    SURGICAL HISTORY  Past Surgical History:   Procedure Laterality Date    COLONOSCOPY  10 + yrs ago    COLONOSCOPY  5/21/15    polyp, int hem, biopsy    DENTAL SURGERY  20+ yrs ago    ENDOSCOPY, COLON, DIAGNOSTIC  5/21/15    egd bx, mild esophagitis, esoph ring, dil, hiatal hernia    HERNIA REPAIR  x2    10 + yrs agoand 39 yrs ago, right ing hernia repair    TONSILLECTOMY  39 yrs ago       FAMILY HISTORY  Family History   Problem Relation Age of Onset    Cancer Mother         breast ca    Heart Disease Father     High Cholesterol Sister     Cancer Maternal Uncle         Melanoma    Cancer Maternal Grandmother         Lung (Smoker)    Heart Disease Maternal Grandmother     Cancer Maternal Grandfather         Leukemia    Heart Disease Maternal Grandfather     Heart Disease Paternal Grandmother     Heart Disease Paternal Grandfather        SOCIAL HISTORY  Social History     Socioeconomic History    Marital status: Single     Spouse name: None    Number of children: None    Years of education: None    Highest education level: None   Occupational History    Occupation: retired/   Social Needs    Financial resource strain: None    Food insecurity:     Worry: None     Inability: None    Transportation needs:     Medical: None     Non-medical: None   Tobacco Use    Smoking status: Former Smoker     Packs/day: 1.00     Years: 32.00     Pack years: 32.00     Last attempt to quit: 4/22/2004     Years since quitting: 15.8    Smokeless tobacco: Never Used   Substance and Sexual Activity    Alcohol use: No    Drug use: No    Sexual activity: Not Currently     Partners: Female, Male   Lifestyle    Physical activity:     Days per week: None     Minutes per session: None    Stress: None   Relationships    Social connections:     Talks on phone: None     Gets together: None     Attends Jain service: None     Active member of club or organization: None     Attends meetings of clubs or organizations: None     Relationship status: None    Intimate partner violence:     Fear of current or ex partner: None     Emotionally abused: None     Physically abused: None     Forced sexual activity: None   Other Topics Concern    None   Social History Narrative    None       REVIEW OF SYSTEMS  Comprehensive ROS completed. In specific,  - Constitutional: Denies fevers, chills, fatigue, weakness, unexpected weight loss/gain  - Cardiovascular: Denies chest pain, shortness of breath, palpitation and dyspnea on exertion  - Musculoskeletal: Denies joint and muscle pain, weakness, spasm  - Neuro: Denies numbness, tingling, paresthesias, loss of consciousness, dizziness  - Skin: Denies ulceration, bruising, scars, open wounds    All other systems reviewed and are negative unless otherwise stated above or in the HPI.       PHYSICAL EXAM  VITAL SIGNS: Resp 16   Ht 5' 9\" (1.753 m)   Wt 200 lb (90.7 kg)   BMI 29.53 kg/m²   General Appearance Alert, well appearing, No acute distress   Eyes clear   Ears, Nose, Throat clear    Neck Supple, non tender   Respiratory Clear breath sounds bilaterally, non-labored breathing   Cardiovascular Heart regular rate and rythm   Gastrointestinal Abdomen: soft, non-tender, non-distended   Lymphatics No adenopathy   Musculoskeletal LUE: CR<2sec, SILT, able to flex/ext fingers, significant swelling and eccymosis throughout hand and fingers,  RUE: multiple bruises and healing abrasions    Skin Normal. No rash or lesions   Neurological Awake, alert and oriented. No focal deficits. Motor and sensory intact   Psychiatric Normal       LABS   No results for input(s): WBC, HEMOGLOBIN, HCT, PLT, NA, K, CL, CO2, BUN, CREATININE, CALCIUM, PHOS, ALBUMIN, MG, INR, PTT, CKMB, TROPONINI, AST, ALT, LIPASE, LACTATE, TSH in the last 72 hours. 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 male with RHD with left closed displaced comminuted distal radius and ulnar styloid fracture   PLAN   - Activity: Non-weight bearing left arm  - Brace: Left sugar tong splint   - consent for open reduction with internal fixation left wrist   Risks and benefits discussed. Potential complications reviewed. All questions answered. - Follow up: 2 weeks post op.   Surgery scheduled for 13:30 Friday 2/29/2020       Electronically signed by: Morro Garcia MD, 2/25/2020 2:26 PM

## 2020-02-26 ENCOUNTER — ANESTHESIA EVENT (OUTPATIENT)
Dept: OPERATING ROOM | Age: 65
End: 2020-02-26
Payer: COMMERCIAL

## 2020-02-26 NOTE — PROGRESS NOTES
.Surgery                     1. Do not eat or drink anything after 12 midnight (or____hours) unless instructed by your doctor prior to surgery. This includes                   no water, chewing gum or mints. 2. Follow your directions as prescribed by the doctor for your procedure and medications. 3. Check with your Doctor regarding stopping Plavix, Coumadin, Lovenox,Effient,Pradaxa,Xarelto, Fragmin or other blood thinners and                   follow their instructions. 4. Do not smoke, and do not drink any alcoholic beverages 24 hours prior to surgery. This includes NA Beer. 5. You may brush your teeth and gargle the morning of surgery. DO NOT SWALLOW WATER   6. You MUST make arrangements for a responsible adult to take you home after your surgery and be able to check on you every couple                   hours for the day. You will not be allowed to leave alone or drive yourself home. It is strongly suggested someone stay with you the first 24                   hrs. Your surgery will be cancelled if you do not have a ride home. 7. Please wear simple, loose fitting clothing to the hospital.  Joycelyn Bae not bring valuables (money, credit cards, checkbooks, etc.) Do not wear any                   makeup (including no eye makeup) or nail polish on your fingers or toes. 8. DO NOT wear any jewelry or piercings on day of surgery. All body piercing jewelry must be removed. 9. If you have dentures, they will be removed before going to the OR; we will provide you a container. If you wear contact lenses or glasses,                  they will be removed; please bring a case for them. 10. If you  have a Living Will and Durable Power of  for Healthcare, please bring in a copy. 11. Please bring picture ID,  insurance card, paperwork from the doctors office    (H & P, Consent, & card for implantable devices).            12. Take a shower the night before or morning of your procedure, do not apply any lotion, oil or powder.

## 2020-02-27 ENCOUNTER — HOSPITAL ENCOUNTER (EMERGENCY)
Age: 65
Discharge: HOME | End: 2020-02-27
Attending: EMERGENCY MEDICINE
Payer: COMMERCIAL

## 2020-02-27 ENCOUNTER — TELEPHONE (OUTPATIENT)
Dept: ORTHOPEDIC SURGERY | Age: 65
End: 2020-02-27

## 2020-02-27 VITALS
TEMPERATURE: 98.1 F | BODY MASS INDEX: 29.62 KG/M2 | HEIGHT: 69 IN | DIASTOLIC BLOOD PRESSURE: 79 MMHG | OXYGEN SATURATION: 95 % | HEART RATE: 75 BPM | WEIGHT: 200 LBS | SYSTOLIC BLOOD PRESSURE: 114 MMHG | RESPIRATION RATE: 16 BRPM

## 2020-02-27 PROCEDURE — 99283 EMERGENCY DEPT VISIT LOW MDM: CPT

## 2020-02-27 PROCEDURE — 4500000028 HC INTERMEDIATE PROCEDURE

## 2020-02-27 ASSESSMENT — PAIN SCALES - GENERAL: PAINLEVEL_OUTOF10: 5

## 2020-02-27 ASSESSMENT — PAIN DESCRIPTION - ORIENTATION: ORIENTATION: LEFT

## 2020-02-27 ASSESSMENT — PAIN DESCRIPTION - PAIN TYPE: TYPE: ACUTE PAIN

## 2020-02-27 ASSESSMENT — PAIN DESCRIPTION - LOCATION: LOCATION: ARM

## 2020-02-27 NOTE — ED PROVIDER NOTES
Emergency Department Encounter    Patient: Constanza Valencia  MRN: [id number]  : 1955  Date of Evaluation: 2020  ED Provider:  River Garcia    Triage Chief Complaint:   Arm Injury (open fracture last week, casted now but increased pain, swelling, numbness, decreased capillary refill, cool fingers)    [place]:  Constanza Valencia is a 59 y.o. male that presents with complaint of left arm pain, numbness in the hand. Was seen on the  for distal radius and ulnar fracture, reduced and splinted and sent for Ortho follow-up, had seen Dr. Gely Green 2 days ago, set up for surgery tomorrow. He just since last night has developed pain and swelling above the splint and now cannot feel his fingers. He is always had bruising and pain in the fingers with swelling since the injury. He feels like it is very numb. He has been sleeping with his arm above his head bent at an angle, this occurred after doing that last night and bruising occurred over the bicep area where the crease of the splint is after doing that last night, that is where he feels the pressure and pain, it is 5 out of 10. Has difficulty now moving his fingers whereas previously he could at least move them. Called Dr. Gely Green' office and was told to come in to be seen. ROS - see HPI, below listed is current ROS at time of my eval:  10 systems reviewed and negative except as above.      Past Medical History:   Diagnosis Date    COPD (chronic obstructive [facility] disease) ([facility]     Dr. Priyanka Cardoso Emphysema     GERD (gastroesophageal reflux disease)     No meds as of 20    HIV disease ([facility]     Follows with Dr. 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 Katja Begum  20+ yrs ago    Peridontal disease    ENDOSCOPY, COLON, DIAGNOSTIC  5/21/15    egd bx, mild esophagitis, esoph ring, dil, hiatal hernia - Dr. Kwon Flattery    10 + yrs ago and 39 yrs ago, right ing hernia repair    TONSILLECTOMY  1960's     Family History   Problem Relation Age of Onset    Cancer Mother         breast ca    Heart Disease Father     High Cholesterol Sister     Cancer Maternal Uncle         Melanoma    Cancer Maternal Grandmother         Lung (Smoker)    Heart Disease Maternal Grandmother     Cancer Maternal Grandfather         Leukemia    Heart Disease Maternal Grandfather     Heart Disease Paternal Grandmother     Heart Disease Paternal Grandfather      Social History     Socioeconomic History    Marital status: Single     Spouse name: Not on file    Number of children: Not on file    Years of education: Not on file    Highest education level: Not on file   Occupational History    Occupation: retired/   Social Needs    Financial resource strain: Not on file    Food insecurity:     Worry: Not on file     Inability: Not on file    Transportation needs:     Medical: Not on file     Non-medical: Not on file   Tobacco Use    Smoking status: Former Smoker     Packs/day: 1.00     Years: 32.00     Pack years: 32.00     Last attempt to quit: 4/22/2004     Years since quitting: 15.8    Smokeless tobacco: Never Used   Substance and Sexual Activity    Alcohol use: No    Drug use: No    Sexual activity: Not Currently     Partners: Female, Male   Lifestyle    Physical activity:     Days per week: Not on file     Minutes per session: Not on file    Stress: Not on file   Relationships    Social connections:     Talks on phone: Not on file     Gets together: Not on file     Attends Shinto service: Not on file     Active member of club or organization: Not on file     Attends meetings of clubs or organizations: Not on file     Relationship status: Not on file    Intimate partner violence:     Fear of splint in place, above the splint over the left bicep there is dark bruising and swelling, he is tender over this area. The fingers are very bruised, mildly cool to touch but his right hand is also mildly cool to touch, capillary refill is a little slow. When I removed the Ace bandage and splint he could suddenly feel his hand again, was having the tingling and sensation of blood flow coming back to the limb. The pain is significantly improved. His pulses are intact on my exam.  I took down the entire dressing as well given that there was that questionable puncture wound when he was first seen. Does not appear to have any surrounding erythema or drainage or blistering around the skin tear site on the ulnar aspect or over the site on the radial aspect. There is significant bruising and swelling over the hand but that had been present previously per the patient. Some mild swelling over the mid to distal forearm. After splint removed capillary refill is <2 seconds on reassessment. Heart:  Regular rate and rhythm  Perfusion:  intact  Respiratory:   Respirations nonlabored. Abdominal:    Non distended. Neurological:  Alert and oriented           Psychiatric:  Appropriate    I have reviewed and interpreted all of the currently available lab results from this visit (if applicable):  No results found for this visit on 02/27/20. Radiographs (if obtained):  Radiologist's Report Reviewed:  No results found. EKG (if obtained): (All EKG's are interpreted by myself in the absence of a cardiologist)      MDM:  60-year-old male presents with concern for swelling and bruising above the splint site on the left arm, numbness and tingling to the fingers, feeling like he cannot move the fingers, increased pain. On exam when I remove the splint it does appear that he likely had some compression over that left upper arm, bicep, antecubital area that likely caused his symptoms.   He has been sleeping with his arm actually directly over his head and I suspect it pinched overnight, thus causing this bruising that was new this morning. He is not on any blood thinners. There is no evidence of infection when I take down the rest of the dressing and evaluate the wound, it is less than a dime size, no bleeding or drainage noted. He is able to move his fingers, sensation now intact, capillary refill less than 2 seconds, fingers are warm to touch, feeling much better on multiple reassessments, compartments all remain very soft on my exam.  I believe this was likely the way he was sleeping with the arm above the head and how it pinched just above the elbow with the splint that caused compression and poor blood flow. He has normal vitals and is in no distress with good pulses and we will replace the splint. I have consulted his orthopedic surgeon to make him aware of the situation given that he had called the office earlier. He is set up for surgery tomorrow and I think that this is appropriate for him to follow-up as an outpatient. Spoke with Dr. Azul Khan to let him know patient was here, he had had issues with the splint earlier in the week as well. Patient with splint replaced has good capillary refill, no pain, no numbness, can wiggle his fingers. Will be discharged to f/u for surgery tomorrow. Clinical Impression:  1. Closed fracture of distal radius and ulna, left, sequela    2. Problem with immobilizing cast      Disposition referral (if applicable):  Ole Marrero MD  [address]  [phone]          Disposition medications (if applicable):  New Prescriptions    No medications on file     ED Provider Disposition Time  DISPOSITION        Comment: Please note this report has been produced using speech recognition software and may contain errors related to that system including errors in grammar, punctuation, and spelling, as well as words and phrases that may be inappropriate. Efforts were made to edit the dictations.         Josue Garcia MD  02/27/20 [id number]

## 2020-02-27 NOTE — DISCHARGE INSTR - COC
Continuity of Care Form    Patient Name: David Carranza   :  1955  MRN:  [id number]    Admit date:  2020  Discharge date:  ***    Code Status Order: Prior   Advance Directives:     Admitting Physician:  No admitting provider for patient encounter.   PCP: Mitchel Gastelum MD    Discharging Nurse: [facility] Unit/Room#: MR20/PA-12  Discharging Unit Phone Number: ***    Emergency Contact:   Extended Emergency Contact Information  Primary Emergency Contact: Jack Ana Luisafilipe, [address] Phone: [phone]  Relation: Brother/Sister    Past Surgical History:  Past Surgical History:   Procedure Laterality Date    COLONOSCOPY  10 + yrs ago    COLONOSCOPY  5/21/15    polyp, int hem, biopsy - Dr. Og Seals  20+ yrs ago    Peridontal disease    ENDOSCOPY, COLON, DIAGNOSTIC  5/21/15    egd bx, mild esophagitis, esoph ring, dil, hiatal hernia - Dr. Albaro Perez    10 + yrs ago and 45 yrs ago, right ing hernia repair    TONSILLECTOMY         Immunization History:   Immunization History   Administered Date(s) Administered    Influenza Virus Vaccine 2014, 10/07/2015    Influenza, Intradermal, Quadrivalent, Preservative Free 10/13/2017    Influenza, Quadv, IM, PF (6 mo and older Fluzone, Flulaval, Fluarix, and 3 yrs and older Afluria) 2016    Pneumococcal Conjugate 13-valent ([id number]) 02/10/2015    Pneumococcal Polysaccharide ([id number]) 2016    Tdap (Boostrix, Adacel) 10/23/2013       Active Problems:  Patient Active Problem List   Diagnosis Code    [facility] ([facility] J42    Vitamin D deficiency E55.9    Insomnia G47.00    Former smoker Z87.891    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 ([facility] B20, G63    Exertional dyspnea R06.09    Dizziness R42    HIV (human immunodeficiency virus infection) ([facility] B20    Pharyngoesophageal dysphagia R13.14    Gastroesophageal reflux disease without oxygen:89231}  Ventilator:    {MH CC Vent UXMD:130037834}    Rehab Therapies: {THERAPEUTIC INTERVENTION:9426356176}  Weight Bearing Status/Restrictions: 50Naeem WIGGINS Weight Bearin}  Other Medical Equipment (for information only, NOT a DME order):  {EQUIPMENT:920735492}  Other Treatments: ***    Patient's personal belongings (please select all that are sent with patient):  {CHP DME Belongings:164493417}    RN SIGNATURE:  {Esignature:599361001}    CASE MANAGEMENT/SOCIAL WORK SECTION    Inpatient Status Date: ***    Readmission Risk Assessment Score:  Readmission Risk              Risk of Unplanned Readmission:        0           Discharging to Facility/ Agency   · Name:   · Address:  · Phone:  · Fax:    Dialysis Facility (if applicable)   · Name:  · Address:  · Dialysis Schedule:  · Phone:  · Fax:    / signature: {Esignature:115205896}    PHYSICIAN SECTION    Prognosis: {Prognosis:7320284186}    Condition at Discharge: [address] Patient Condition:054697351}    Rehab Potential (if transferring to Rehab): {Prognosis:2046644461}    Recommended Labs or Other Treatments After Discharge: ***    Physician Certification: I certify the above information and transfer of Neo Hopper  is necessary for the continuing treatment of the diagnosis listed and that he requires {Admit to Appropriate Level of Care:27364} for {GREATER/LESS:843535303} 30 days.      Update Admission H&P: {CHP DME Changes in DWEDX:040804769}    PHYSICIAN SIGNATURE:  {Esignature:515946009}

## 2020-02-28 ENCOUNTER — ANESTHESIA (OUTPATIENT)
Dept: OPERATING ROOM | Age: 65
End: 2020-02-28
Payer: COMMERCIAL

## 2020-02-28 ENCOUNTER — HOSPITAL ENCOUNTER (OUTPATIENT)
Age: 65
Setting detail: OUTPATIENT SURGERY
Discharge: HOME | End: 2020-02-28
Attending: ORTHOPAEDIC SURGERY | Admitting: ORTHOPAEDIC SURGERY
Payer: COMMERCIAL

## 2020-02-28 ENCOUNTER — APPOINTMENT (OUTPATIENT)
Dept: GENERAL RADIOLOGY | Age: 65
End: 2020-02-28
Attending: ORTHOPAEDIC SURGERY
Payer: COMMERCIAL

## 2020-02-28 VITALS
SYSTOLIC BLOOD PRESSURE: 157 MMHG | OXYGEN SATURATION: 94 % | DIASTOLIC BLOOD PRESSURE: 74 MMHG | HEART RATE: 67 BPM | BODY MASS INDEX: 29.62 KG/M2 | TEMPERATURE: 97 F | HEIGHT: 69 IN | RESPIRATION RATE: 16 BRPM | WEIGHT: 200 LBS

## 2020-02-28 VITALS
OXYGEN SATURATION: 99 % | SYSTOLIC BLOOD PRESSURE: 137 MMHG | TEMPERATURE: 95.9 F | RESPIRATION RATE: 10 BRPM | DIASTOLIC BLOOD PRESSURE: 70 MMHG

## 2020-02-28 LAB
ANION GAP SERPL CALCULATED.3IONS-SCNC: 11 MMOL/L (ref 4–16)
BUN BLDV-MCNC: 13 MG/DL (ref 6–23)
CALCIUM SERPL-MCNC: 9.1 MG/DL (ref 8.3–10.6)
CHLORIDE BLD-SCNC: 106 MMOL/L (ref 99–110)
CO2: 22 MMOL/L (ref 21–32)
CREAT SERPL-MCNC: 1.1 MG/DL (ref 0.9–1.3)
GFR AFRICAN AMERICAN: >60 ML/MIN/1.73M2
GFR NON-AFRICAN AMERICAN: >60 ML/MIN/1.73M2
GLUCOSE BLD-MCNC: 91 MG/DL (ref 70–99)
HCT VFR BLD CALC: 46.7 % (ref 42–52)
HEMOGLOBIN: 15.1 GM/DL (ref 13.5–18)
MCH RBC QN AUTO: 30.2 PG (ref 27–31)
MCHC RBC AUTO-ENTMCNC: 32.3 % (ref 32–36)
MCV RBC AUTO: 93.4 FL (ref 78–100)
PDW BLD-RTO: 13.8 % (ref 11.7–14.9)
PLATELET # BLD: 261 K/CU MM (ref 140–440)
PMV BLD AUTO: 9.2 FL (ref 7.5–11.1)
POTASSIUM SERPL-SCNC: 4 MMOL/L (ref 3.5–5.1)
RBC # BLD: 5 M/CU MM (ref 4.6–6.2)
REASON FOR REJECTION: NORMAL
REASON FOR REJECTION: NORMAL
REJECTED TEST: NORMAL
SODIUM BLD-SCNC: 139 MMOL/L (ref 135–145)
WBC # BLD: 8.6 K/CU MM (ref 4–10.5)

## 2020-02-28 PROCEDURE — 25608 OPTX DST RD XART FX/EPI SEP2: CPT | Performed by: ORTHOPAEDIC SURGERY

## 2020-02-28 PROCEDURE — 76000 FLUOROSCOPY <1 HR PHYS/QHP: CPT

## 2020-02-28 PROCEDURE — 6360000002 HC RX W HCPCS: Performed by: ANESTHESIOLOGY

## 2020-02-28 PROCEDURE — 6360000002 HC RX W HCPCS: Performed by: NURSE ANESTHETIST, CERTIFIED REGISTERED

## 2020-02-28 PROCEDURE — 3600000014 HC SURGERY LEVEL 4 ADDTL 15MIN: Performed by: ORTHOPAEDIC SURGERY

## 2020-02-28 PROCEDURE — 7100000000 HC PACU RECOVERY - FIRST 15 MIN: Performed by: ORTHOPAEDIC SURGERY

## 2020-02-28 PROCEDURE — 7100000001 HC PACU RECOVERY - ADDTL 15 MIN: Performed by: ORTHOPAEDIC SURGERY

## 2020-02-28 PROCEDURE — 80048 BASIC METABOLIC PNL TOTAL CA: CPT

## 2020-02-28 PROCEDURE — 3700000001 HC ADD 15 MINUTES (ANESTHESIA): Performed by: ORTHOPAEDIC SURGERY

## 2020-02-28 PROCEDURE — C1713 ANCHOR/SCREW BN/BN,TIS/BN: HCPCS | Performed by: ORTHOPAEDIC SURGERY

## 2020-02-28 PROCEDURE — 7100000010 HC PHASE II RECOVERY - FIRST 15 MIN: Performed by: ORTHOPAEDIC SURGERY

## 2020-02-28 PROCEDURE — 2500000003 HC RX 250 WO HCPCS: Performed by: NURSE ANESTHETIST, CERTIFIED REGISTERED

## 2020-02-28 PROCEDURE — 85027 COMPLETE CBC AUTOMATED: CPT

## 2020-02-28 PROCEDURE — 2720000010 HC SURG SUPPLY STERILE: Performed by: ORTHOPAEDIC SURGERY

## 2020-02-28 PROCEDURE — 25608 OPTX DST RD XART FX/EPI SEP2: CPT | Performed by: PHYSICIAN ASSISTANT

## 2020-02-28 PROCEDURE — 3600000004 HC SURGERY LEVEL 4 BASE: Performed by: ORTHOPAEDIC SURGERY

## 2020-02-28 PROCEDURE — 2580000003 HC RX 258: Performed by: ANESTHESIOLOGY

## 2020-02-28 PROCEDURE — 2709999900 HC NON-CHARGEABLE SUPPLY: Performed by: ORTHOPAEDIC SURGERY

## 2020-02-28 PROCEDURE — 3700000000 HC ANESTHESIA ATTENDED CARE: Performed by: ORTHOPAEDIC SURGERY

## 2020-02-28 PROCEDURE — 6360000002 HC RX W HCPCS: Performed by: ORTHOPAEDIC SURGERY

## 2020-02-28 DEVICE — SCREW BNE L20MM DIA2.4MM DST RAD VOLAR S STL ST VAR ANG LOK: Type: IMPLANTABLE DEVICE | Site: WRIST | Status: FUNCTIONAL

## 2020-02-28 DEVICE — SCREW BNE L18MM DIA2.7MM CORT S STL ST T8 STARDRV RECESS: Type: IMPLANTABLE DEVICE | Site: WRIST | Status: FUNCTIONAL

## 2020-02-28 DEVICE — SCREW BNE L14MM DIA2.7MM CORT S STL ST T8 STARDRV RECESS: Type: IMPLANTABLE DEVICE | Site: WRIST | Status: FUNCTIONAL

## 2020-02-28 DEVICE — SCREW BNE L16MM DIA2.7MM CORT S STL ST T8 STARDRV RECESS: Type: IMPLANTABLE DEVICE | Site: WRIST | Status: FUNCTIONAL

## 2020-02-28 DEVICE — SCREW BNE L18MM DIA2.4MM DST RAD VOLAR S STL ST VAR ANG LOK: Type: IMPLANTABLE DEVICE | Site: WRIST | Status: FUNCTIONAL

## 2020-02-28 DEVICE — IMPLANTABLE DEVICE: Type: IMPLANTABLE DEVICE | Site: WRIST | Status: FUNCTIONAL

## 2020-02-28 DEVICE — SCREW BNE L24MM DIA2.4MM DST RAD VOLAR S STL ST VAR ANG LOK: Type: IMPLANTABLE DEVICE | Site: WRIST | Status: FUNCTIONAL

## 2020-02-28 RX ORDER — ONDANSETRON 2 MG/ML
4 INJECTION INTRAMUSCULAR; INTRAVENOUS
Status: DISCONTINUED | OUTPATIENT
Start: 2020-02-28 | End: 2020-02-28 | Stop reason: HOSPADM

## 2020-02-28 RX ORDER — PROPOFOL 10 MG/ML
INJECTION, EMULSION INTRAVENOUS PRN
Status: DISCONTINUED | OUTPATIENT
Start: 2020-02-28 | End: 2020-02-28 | Stop reason: SDUPTHER

## 2020-02-28 RX ORDER — CEFAZOLIN SODIUM 2 G/50ML
SOLUTION INTRAVENOUS
Status: COMPLETED
Start: 2020-02-28 | End: 2020-02-28

## 2020-02-28 RX ORDER — SODIUM CHLORIDE, SODIUM LACTATE, POTASSIUM CHLORIDE, CALCIUM CHLORIDE 600; 310; 30; 20 MG/100ML; MG/100ML; MG/100ML; MG/100ML
INJECTION, SOLUTION INTRAVENOUS
Status: COMPLETED
Start: 2020-02-28 | End: 2020-02-28

## 2020-02-28 RX ORDER — CEFAZOLIN SODIUM 2 G/50ML
2 SOLUTION INTRAVENOUS ONCE
Status: COMPLETED | OUTPATIENT
Start: 2020-02-28 | End: 2020-02-28

## 2020-02-28 RX ORDER — DEXAMETHASONE SODIUM PHOSPHATE 4 MG/ML
INJECTION, SOLUTION INTRA-ARTICULAR; INTRALESIONAL; INTRAMUSCULAR; INTRAVENOUS; SOFT TISSUE PRN
Status: DISCONTINUED | OUTPATIENT
Start: 2020-02-28 | End: 2020-02-28 | Stop reason: SDUPTHER

## 2020-02-28 RX ORDER — ONDANSETRON 2 MG/ML
INJECTION INTRAMUSCULAR; INTRAVENOUS PRN
Status: DISCONTINUED | OUTPATIENT
Start: 2020-02-28 | End: 2020-02-28 | Stop reason: SDUPTHER

## 2020-02-28 RX ORDER — FENTANYL CITRATE 50 UG/ML
25 INJECTION, SOLUTION INTRAMUSCULAR; INTRAVENOUS EVERY 5 MIN PRN
Status: COMPLETED | OUTPATIENT
Start: 2020-02-28 | End: 2020-02-28

## 2020-02-28 RX ORDER — HYDROMORPHONE HCL 110MG/55ML
0.5 PATIENT CONTROLLED ANALGESIA SYRINGE INTRAVENOUS EVERY 5 MIN PRN
Status: DISCONTINUED | OUTPATIENT
Start: 2020-02-28 | End: 2020-02-28 | Stop reason: HOSPADM

## 2020-02-28 RX ORDER — CEFAZOLIN SODIUM 1 G/3ML
2 INJECTION, POWDER, FOR SOLUTION INTRAMUSCULAR; INTRAVENOUS ONCE
Status: DISCONTINUED | OUTPATIENT
Start: 2020-02-28 | End: 2020-02-28 | Stop reason: SDUPTHER

## 2020-02-28 RX ORDER — OXYCODONE HYDROCHLORIDE AND ACETAMINOPHEN 5; 325 MG/1; MG/1
1 TABLET ORAL EVERY 6 HOURS PRN
Qty: 28 TABLET | Refills: 0 | Status: SHIPPED | OUTPATIENT
Start: 2020-02-28 | End: 2020-03-06

## 2020-02-28 RX ORDER — FENTANYL CITRATE 50 UG/ML
INJECTION, SOLUTION INTRAMUSCULAR; INTRAVENOUS PRN
Status: DISCONTINUED | OUTPATIENT
Start: 2020-02-28 | End: 2020-02-28 | Stop reason: SDUPTHER

## 2020-02-28 RX ORDER — HYDRALAZINE HYDROCHLORIDE 20 MG/ML
5 INJECTION INTRAMUSCULAR; INTRAVENOUS EVERY 10 MIN PRN
Status: DISCONTINUED | OUTPATIENT
Start: 2020-02-28 | End: 2020-02-28 | Stop reason: HOSPADM

## 2020-02-28 RX ORDER — ROCURONIUM BROMIDE 10 MG/ML
INJECTION, SOLUTION INTRAVENOUS PRN
Status: DISCONTINUED | OUTPATIENT
Start: 2020-02-28 | End: 2020-02-28 | Stop reason: SDUPTHER

## 2020-02-28 RX ORDER — LABETALOL 20 MG/4 ML (5 MG/ML) INTRAVENOUS SYRINGE
5 EVERY 10 MIN PRN
Status: DISCONTINUED | OUTPATIENT
Start: 2020-02-28 | End: 2020-02-28 | Stop reason: HOSPADM

## 2020-02-28 RX ORDER — SODIUM CHLORIDE, SODIUM LACTATE, POTASSIUM CHLORIDE, CALCIUM CHLORIDE 600; 310; 30; 20 MG/100ML; MG/100ML; MG/100ML; MG/100ML
INJECTION, SOLUTION INTRAVENOUS CONTINUOUS
Status: DISCONTINUED | OUTPATIENT
Start: 2020-02-28 | End: 2020-02-28 | Stop reason: HOSPADM

## 2020-02-28 RX ADMIN — DEXAMETHASONE SODIUM PHOSPHATE 4 MG: 4 INJECTION, SOLUTION INTRAMUSCULAR; INTRAVENOUS at 14:07

## 2020-02-28 RX ADMIN — HYDROMORPHONE HYDROCHLORIDE 0.5 MG: 2 INJECTION, SOLUTION INTRAMUSCULAR; INTRAVENOUS; SUBCUTANEOUS at 15:36

## 2020-02-28 RX ADMIN — FENTANYL CITRATE 25 MCG: 50 INJECTION INTRAMUSCULAR; INTRAVENOUS at 15:31

## 2020-02-28 RX ADMIN — FENTANYL CITRATE 25 MCG: 50 INJECTION INTRAMUSCULAR; INTRAVENOUS at 15:53

## 2020-02-28 RX ADMIN — FENTANYL CITRATE 25 MCG: 50 INJECTION INTRAMUSCULAR; INTRAVENOUS at 15:58

## 2020-02-28 RX ADMIN — CEFAZOLIN SODIUM 2 G: 2 SOLUTION INTRAVENOUS at 14:05

## 2020-02-28 RX ADMIN — SODIUM CHLORIDE, POTASSIUM CHLORIDE, SODIUM LACTATE AND CALCIUM CHLORIDE: 600; 310; 30; 20 INJECTION, SOLUTION INTRAVENOUS at 12:35

## 2020-02-28 RX ADMIN — FENTANYL CITRATE 25 MCG: 50 INJECTION INTRAMUSCULAR; INTRAVENOUS at 14:47

## 2020-02-28 RX ADMIN — FENTANYL CITRATE 25 MCG: 50 INJECTION INTRAMUSCULAR; INTRAVENOUS at 16:08

## 2020-02-28 RX ADMIN — FENTANYL CITRATE 25 MCG: 50 INJECTION INTRAMUSCULAR; INTRAVENOUS at 16:03

## 2020-02-28 RX ADMIN — FENTANYL CITRATE 50 MCG: 50 INJECTION INTRAMUSCULAR; INTRAVENOUS at 14:00

## 2020-02-28 RX ADMIN — PROPOFOL 150 MG: 10 INJECTION, EMULSION INTRAVENOUS at 14:02

## 2020-02-28 RX ADMIN — ONDANSETRON 4 MG: 2 INJECTION INTRAMUSCULAR; INTRAVENOUS at 15:15

## 2020-02-28 RX ADMIN — SUGAMMADEX 200 MG: 100 INJECTION, SOLUTION INTRAVENOUS at 15:17

## 2020-02-28 RX ADMIN — HYDROMORPHONE HYDROCHLORIDE 0.5 MG: 2 INJECTION, SOLUTION INTRAMUSCULAR; INTRAVENOUS; SUBCUTANEOUS at 15:46

## 2020-02-28 RX ADMIN — ROCURONIUM BROMIDE 50 MG: 10 INJECTION INTRAVENOUS at 14:02

## 2020-02-28 RX ADMIN — HYDROMORPHONE HYDROCHLORIDE 0.5 MG: 2 INJECTION, SOLUTION INTRAMUSCULAR; INTRAVENOUS; SUBCUTANEOUS at 16:20

## 2020-02-28 ASSESSMENT — PULMONARY FUNCTION TESTS
PIF_VALUE: 14
PIF_VALUE: 1
PIF_VALUE: 15
PIF_VALUE: 14
PIF_VALUE: 15
PIF_VALUE: 14
PIF_VALUE: 15
PIF_VALUE: 0
PIF_VALUE: 15
PIF_VALUE: 15
PIF_VALUE: 4
PIF_VALUE: 17
PIF_VALUE: 15
PIF_VALUE: 15
PIF_VALUE: 12
PIF_VALUE: 13
PIF_VALUE: 13
PIF_VALUE: 15
PIF_VALUE: 15
PIF_VALUE: 14
PIF_VALUE: 15
PIF_VALUE: 14
PIF_VALUE: 15
PIF_VALUE: 14
PIF_VALUE: 17
PIF_VALUE: 15
PIF_VALUE: 14
PIF_VALUE: 3
PIF_VALUE: 0
PIF_VALUE: 13
PIF_VALUE: 14
PIF_VALUE: 13
PIF_VALUE: 1
PIF_VALUE: 15
PIF_VALUE: 14
PIF_VALUE: 15
PIF_VALUE: 15
PIF_VALUE: 14
PIF_VALUE: 14
PIF_VALUE: 13
PIF_VALUE: 17
PIF_VALUE: 15
PIF_VALUE: 14
PIF_VALUE: 4
PIF_VALUE: 15
PIF_VALUE: 14
PIF_VALUE: 14
PIF_VALUE: 15
PIF_VALUE: 15
PIF_VALUE: 2
PIF_VALUE: 15
PIF_VALUE: 2
PIF_VALUE: 13
PIF_VALUE: 15
PIF_VALUE: 2
PIF_VALUE: 15
PIF_VALUE: 3
PIF_VALUE: 13
PIF_VALUE: 15
PIF_VALUE: 15
PIF_VALUE: 14
PIF_VALUE: 15
PIF_VALUE: 3
PIF_VALUE: 14
PIF_VALUE: 19
PIF_VALUE: 17
PIF_VALUE: 29
PIF_VALUE: 17
PIF_VALUE: 2
PIF_VALUE: 15
PIF_VALUE: 15
PIF_VALUE: 14
PIF_VALUE: 15
PIF_VALUE: 13
PIF_VALUE: 4
PIF_VALUE: 14

## 2020-02-28 ASSESSMENT — PAIN DESCRIPTION - ORIENTATION
ORIENTATION: LEFT

## 2020-02-28 ASSESSMENT — PAIN SCALES - GENERAL
PAINLEVEL_OUTOF10: 9
PAINLEVEL_OUTOF10: 6
PAINLEVEL_OUTOF10: 5
PAINLEVEL_OUTOF10: 4
PAINLEVEL_OUTOF10: 6
PAINLEVEL_OUTOF10: 7
PAINLEVEL_OUTOF10: 9
PAINLEVEL_OUTOF10: 6
PAINLEVEL_OUTOF10: 6
PAINLEVEL_OUTOF10: 10

## 2020-02-28 ASSESSMENT — PAIN DESCRIPTION - LOCATION
LOCATION: WRIST

## 2020-02-28 ASSESSMENT — PAIN DESCRIPTION - PAIN TYPE
TYPE: SURGICAL PAIN

## 2020-02-28 ASSESSMENT — PAIN DESCRIPTION - PROGRESSION
CLINICAL_PROGRESSION: GRADUALLY WORSENING
CLINICAL_PROGRESSION: GRADUALLY IMPROVING
CLINICAL_PROGRESSION: GRADUALLY IMPROVING
CLINICAL_PROGRESSION: NOT CHANGED
CLINICAL_PROGRESSION: GRADUALLY IMPROVING

## 2020-02-28 ASSESSMENT — ENCOUNTER SYMPTOMS: SHORTNESS OF BREATH: 1

## 2020-02-28 ASSESSMENT — PAIN - FUNCTIONAL ASSESSMENT: PAIN_FUNCTIONAL_ASSESSMENT: 0-10

## 2020-02-28 ASSESSMENT — COPD QUESTIONNAIRES: CAT_SEVERITY: SEVERE

## 2020-02-28 NOTE — H&P
Ramos Clark MD   Abacavir-Dolutegravir-Lamivud [facility]) 600- MG TABS Take 1 tablet by mouth daily 1/8/20  Yes Artis Bravo MD   albuterol sulfate  (90 BASE) MCG/ACT inhaler Inhale 2 puffs into the lungs every 4 hours as needed for Wheezing   Yes Historical Provider, MD   Respiratory Therapy Supplies (NEBULIZER/TUBING/MOUTHPIECE) KIT 1 kit by Does not apply route daily as needed. Dx copd 496 6/19/14  Yes Ramos Clark MD   zolpidem (AMBIEN) 10 MG tablet Take 10 mg by mouth nightly as needed.    Yes Historical Provider, MD   aspirin 81 MG chewable tablet Take 1 tablet by mouth daily  Patient not taking: Reported on 2/25/2020 12/12/16   Wesley Garcia MD       ALLERGIES  No Known Allergies    SURGICAL HISTORY  Past Surgical History:   Procedure Laterality Date    COLONOSCOPY  10 + yrs ago    COLONOSCOPY  5/21/15    polyp, int hem, biopsy - Dr. Army Mcnair  20+ yrs ago    Peridontal disease    ENDOSCOPY, COLON, DIAGNOSTIC  5/21/15    egd bx, mild esophagitis, esoph ring, dil, hiatal hernia - Dr. Sis Murray    10 + yrs ago and 39 yrs ago, right ing hernia repair    TONSILLECTOMY  1960's       FAMILY HISTORY  Family History   Problem Relation Age of Onset    Cancer Mother         breast ca    Heart Disease Father     High Cholesterol Sister     Cancer Maternal Uncle         Melanoma    Cancer Maternal Grandmother         Lung (Smoker)    Heart Disease Maternal Grandmother     Cancer Maternal Grandfather         Leukemia    Heart Disease Maternal Grandfather     Heart Disease Paternal Grandmother     Heart Disease Paternal Grandfather        SOCIAL HISTORY  Social History     Socioeconomic History    Marital status: Single     Spouse name: None    Number of children: None    Years of education: None    Highest education level: None   Occupational History    Occupation: retired/   Social Needs    Financial resource strain: None and rythm   Gastrointestinal Abdomen: soft, non-tender, non-distended   Lymphatics No adenopathy   Musculoskeletal LUE: CR<2sec, SILT, wiggles fingers, splint in place   Skin Normal. No rash or lesions   Neurological Awake, alert and oriented. No focal deficits. 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 male with RHD with left closed displaced distal fracture and ulna fracture   PLAN   1. NPO  2. Ancef 2g IV OCTOR  3. Consent for open reduction with internal fixation left wrist.  Risks and benefits reviewed. Operative site marked. All questions answered.    4. Non-weight bearing left wrist      Electronically signed by: Jodi Mejia MD, 2/28/2020 1:20 PM

## 2020-02-28 NOTE — BRIEF OP NOTE
Brief Postoperative Note  ______________________________________________________________    Patient: Ruby Franks  YOB: 1955  MRN: [id number]  Date of Procedure: 2/28/2020    Pre-Op Diagnosis: left distal radius and ulna fracture    Post-Op Diagnosis: Same       Procedure(s):  OPEN REDUCTION INTERNAL FIXATION LEFT DISTAL RADIUS     Anesthesia: General    Surgeon(s):  Michelle Manning MD    Assistant: Bety Braxton PA-C    Estimated Blood Loss (mL): less than 50     Complications: None    Specimens:   * No specimens in log *    Implants:  Implant Name Type Inv.  Item Serial No.  Lot No. LRB No. Used   LCP DISTAL RADIUS PLATE 7 HOLES/5 HOLE SHAFT     SYNTHES  Left 1   SCREW CORTCL LK SLFTP T8 2.7X16MM Screw/Plate/Nail/Dave SCREW CORTCL LK SLFTP T8 2.7X16MM  SYNTHES  Left 2   SCREW CORTCL LK SLFTP T8 2.7X14MM Screw/Plate/Nail/Dave SCREW CORTCL LK SLFTP T8 2.7X14MM  SYNTHES  Left 1   SCREW 2.4MM VA LK STRDRV 24MM Screw/Plate/Nail/Dave SCREW 2.4MM VA LK STRDRV 24MM  SYNTHES  Left 2   SCREW LK NATALIA ANGL 2.4X20MM Screw/Plate/Nail/Dave SCREW LK NATALIA ANGL 2.4X20MM  SYNTHES  Left 2   SCREW LK NATALIA ANGL 2.4X18MM Screw/Plate/Nail/Dave SCREW LK NATALIA ANGL 2.4X18MM  SYNTHES  Left 1   SCREW CORTCL LK SLFTP T8 2.7X18MM Screw/Plate/Nail/Dave SCREW CORTCL LK SLFTP T8 2.7X18MM  SYNTHES  Left 1         Drains: * No LDAs found *    Findings: left displaced distal radius and ulna fracture    Bety Braxton PA-C  Date: 2/28/2020  Time: 3:30 PM

## 2020-02-28 NOTE — ANESTHESIA PRE PROCEDURE
Department of Anesthesiology  Preprocedure Note       Name:  Goldie Thomas   Age:  59 y.o.  :  1955                                          MRN:  [id number]         Date:  2020      Surgeon: Quinten Colon):  Moreno Kidd MD    Procedure: WRIST OPEN REDUCTION INTERNAL FIXATION LEFT (Left )    Medications prior to admission:   Prior to Admission medications    Medication Sig Start Date End Date Taking? Authorizing Provider   budesonide (PULMICORT FLEXHALER) 180 MCG/ACT AEPB inhaler Inhale 2 puffs into the lungs 2 times daily. 20  Yes Janette Rodriguez MD   albuterol-ipratropium (COMBIVENT RESPIMAT)  MCG/ACT AERS inhaler Inhale 1 puff into the lungs every 6 hours as needed for Wheezing 20  Yes Janette Rodriguez MD   theophylline (UNIPHYL) 400 MG extended release tablet Take one half tablet by mouth twice daily 20  Yes Janette Rodriguez MD   Abacavir-Dolutegravir-Lamivud (TRIUMEQ) 600- MG TABS Take 1 tablet by mouth daily 20  Yes Piero Zaragoza MD   albuterol sulfate  (90 BASE) MCG/ACT inhaler Inhale 2 puffs into the lungs every 4 hours as needed for Wheezing   Yes Historical Provider, MD   Respiratory Therapy Supplies (NEBULIZER/TUBING/MOUTHPIECE) KIT 1 kit by Does not apply route daily as needed. Dx copd 496 14  Yes Janette Rodriguez MD   zolpidem (AMBIEN) 10 MG tablet Take 10 mg by mouth nightly as needed.    Yes Historical Provider, MD   aspirin 81 MG chewable tablet Take 1 tablet by mouth daily  Patient not taking: Reported on 16   Carla Hurtado MD       Current medications:    Current Facility-Administered Medications   Medication Dose Route Frequency Provider Last Rate Last Dose    lactated ringers infusion   Intravenous Continuous Armando Saeed  mL/hr at 20 1235      lactated ringers infusion                Allergies:  No Known Allergies    Problem List:    Patient Active Problem List   Diagnosis Code   

## 2020-03-10 ENCOUNTER — OFFICE VISIT (OUTPATIENT)
Dept: ORTHOPEDIC SURGERY | Age: 65
End: 2020-03-10

## 2020-03-10 VITALS — HEART RATE: 70 BPM | BODY MASS INDEX: 29.62 KG/M2 | OXYGEN SATURATION: 98 % | HEIGHT: 69 IN | WEIGHT: 199.96 LBS

## 2020-03-10 PROCEDURE — 99024 POSTOP FOLLOW-UP VISIT: CPT | Performed by: PHYSICIAN ASSISTANT

## 2020-03-19 ENCOUNTER — OFFICE VISIT (OUTPATIENT)
Dept: ORTHOPEDIC SURGERY | Age: 65
End: 2020-03-19

## 2020-03-19 VITALS — HEART RATE: 74 BPM | RESPIRATION RATE: 16 BRPM | OXYGEN SATURATION: 93 %

## 2020-03-19 PROCEDURE — 99024 POSTOP FOLLOW-UP VISIT: CPT | Performed by: ORTHOPAEDIC SURGERY

## 2020-03-19 NOTE — PATIENT INSTRUCTIONS
Continue Range of motion of the left hand  Wear brace when walking or out in public  May take brace off for exercise

## 2020-03-19 NOTE — PROGRESS NOTES
ORTHOPEDIC PROGRESS NOTE    3/19/2020    Patient name: Marsha Jones  : 1955      SUBJECTIVE     Chief Complaint   Patient presents with    Wrist Pain     1 week follow p suture removal, left wrist hardware placement     The patient was seen and examined. DOS/DOI: 2020  Procedure/Injury: ORIF left distal radius     Patient states he doing well. Notes improved pain. Compliant with treatment recommendations. Denies further concerns. He has had improved range of motion of his fingers and thumb in the last week    OBJECTIVE     GEN: A&O, NAD  MS: LUE- SILT; CR <2 sec; incision intact with sutures in place, wound healed, sutures removed and steristrips placed; able to move digits and thumb actively; can make a full fist and ext digits and IP joint of thumb  Radial pulse 2+ NV intact; moderate swelling and ecchymosis. ASSESSMENT     59 y.o. male,  s/p ORIF left distal radius ORIF 2020    PLAN     - Activity: Non-weight bearing left wrist  - Brace: velcrow brace. Okay to remove for hygiene and gentle motion.    Sutures removed  - Follow up:  with Dr Miguel Hiness      Electronically signed by:Carolyn Calzada MD, 3/19/2020 3:09 PM

## 2020-04-14 ENCOUNTER — OFFICE VISIT (OUTPATIENT)
Dept: ORTHOPEDIC SURGERY | Age: 65
End: 2020-04-14

## 2020-04-14 VITALS
RESPIRATION RATE: 16 BRPM | HEIGHT: 69 IN | HEART RATE: 101 BPM | BODY MASS INDEX: 29.49 KG/M2 | OXYGEN SATURATION: 87 % | WEIGHT: 199.08 LBS

## 2020-04-14 PROBLEM — Z87.81 S/P ORIF (OPEN REDUCTION INTERNAL FIXATION) FRACTURE: Status: ACTIVE | Noted: 2020-04-14

## 2020-04-14 PROBLEM — Z98.890 S/P ORIF (OPEN REDUCTION INTERNAL FIXATION) FRACTURE: Status: ACTIVE | Noted: 2020-04-14

## 2020-04-14 PROCEDURE — 99024 POSTOP FOLLOW-UP VISIT: CPT | Performed by: PHYSICIAN ASSISTANT

## 2020-04-14 NOTE — PROGRESS NOTES
ORTHOPEDIC PROGRESS NOTE    2020    Patient name: Carolina Hopkins  : 1955      SUBJECTIVE     Chief Complaint   Patient presents with    Post-Op Check     left wrist ORIF DOS 2020     The patient was seen and examined. DOS/DOI: 2020  Procedure/Injury: ORIF left distal radius     Patient states he is doing well. Had some concerns about infection after a steri strip was removed but now that has improved. He has been doing dressing changes and applying antibiotic ointment. Denies any fevers/chills. Otherwise, wearing his brace as recommended. OBJECTIVE     GEN: A&O, NAD  MS: LUE- SILT; CR <2 sec; incision intact with no sign of infection; most proximal aspect of incision is continuing to heal; NV intact; able to make a fist and extend digits; good intrinsic strength; tolerates gentle AROM of wrist.     X-rays: 3 views left wrist confirm stable fixation left distal radius fracture. Ulnar styloid fracture noted. ASSESSMENT     59 y.o. male,  s/p ORIF left distal radius ORIF 2020, approximately 6 weeks postop    PLAN     - Activity: No heavy lifting, pushing, pulling left wrist. Offered him therapy and he declined. - Brace: velcrow brace. Okay to remove for hygiene and gentle motion. - Continue to monitor skin. No sign of infection today.    - Follow up: 6 weeks     Electronically signed by:Nahomi Fraga, 2020 9:39 AM

## 2020-05-01 ENCOUNTER — HOSPITAL ENCOUNTER (OUTPATIENT)
Dept: GENERAL RADIOLOGY | Age: 65
Discharge: HOME | End: 2020-05-01
Payer: COMMERCIAL

## 2020-05-01 ENCOUNTER — HOSPITAL ENCOUNTER (OUTPATIENT)
Age: 65
Discharge: HOME | End: 2020-05-01
Payer: COMMERCIAL

## 2020-05-01 PROCEDURE — 71046 X-RAY EXAM CHEST 2 VIEWS: CPT

## 2020-05-26 ENCOUNTER — OFFICE VISIT (OUTPATIENT)
Dept: ORTHOPEDIC SURGERY | Age: 65
End: 2020-05-26

## 2020-05-26 ENCOUNTER — HOSPITAL ENCOUNTER (OUTPATIENT)
Dept: GENERAL RADIOLOGY | Age: 65
Discharge: HOME | End: 2020-05-26
Payer: COMMERCIAL

## 2020-05-26 ENCOUNTER — HOSPITAL ENCOUNTER (OUTPATIENT)
Age: 65
Discharge: HOME | End: 2020-05-26
Payer: COMMERCIAL

## 2020-05-26 VITALS — HEIGHT: 69 IN | BODY MASS INDEX: 28.14 KG/M2 | WEIGHT: 190 LBS | RESPIRATION RATE: 16 BRPM

## 2020-05-26 PROCEDURE — 99024 POSTOP FOLLOW-UP VISIT: CPT | Performed by: PHYSICIAN ASSISTANT

## 2020-05-26 PROCEDURE — 71046 X-RAY EXAM CHEST 2 VIEWS: CPT

## 2020-05-26 NOTE — PATIENT INSTRUCTIONS
Weightbearing and activities as tolerated, gradual and if painful back off  ROM of the wrist as tolerated   Discontinue brace, may wear as needed   Follow up in 6 weeks with x-rays

## 2020-06-13 ENCOUNTER — APPOINTMENT (OUTPATIENT)
Dept: CT IMAGING | Age: 65
End: 2020-06-13
Payer: COMMERCIAL

## 2020-06-13 ENCOUNTER — APPOINTMENT (OUTPATIENT)
Dept: GENERAL RADIOLOGY | Age: 65
End: 2020-06-13
Payer: COMMERCIAL

## 2020-06-13 ENCOUNTER — HOSPITAL ENCOUNTER (EMERGENCY)
Age: 65
Discharge: ANOTHER HOSPITAL | End: 2020-06-13
Attending: EMERGENCY MEDICINE
Payer: COMMERCIAL

## 2020-06-13 VITALS
HEART RATE: 84 BPM | WEIGHT: 195 LBS | RESPIRATION RATE: 16 BRPM | TEMPERATURE: 98.7 F | OXYGEN SATURATION: 96 % | HEIGHT: 68 IN | SYSTOLIC BLOOD PRESSURE: 109 MMHG | DIASTOLIC BLOOD PRESSURE: 62 MMHG | BODY MASS INDEX: 29.55 KG/M2

## 2020-06-13 LAB
ALBUMIN SERPL-MCNC: 3.7 GM/DL (ref 3.4–5)
ALP BLD-CCNC: 131 IU/L (ref 40–128)
ALT SERPL-CCNC: 9 U/L (ref 10–40)
AMPHETAMINES: NEGATIVE
ANION GAP SERPL CALCULATED.3IONS-SCNC: 10 MMOL/L (ref 4–16)
AST SERPL-CCNC: 19 IU/L (ref 15–37)
BACTERIA: NEGATIVE /HPF
BARBITURATE SCREEN URINE: NEGATIVE
BASOPHILS ABSOLUTE: 0 K/CU MM
BASOPHILS RELATIVE PERCENT: 0.3 % (ref 0–1)
BENZODIAZEPINE SCREEN, URINE: NEGATIVE
BILIRUB SERPL-MCNC: 0.2 MG/DL (ref 0–1)
BILIRUBIN URINE: NEGATIVE MG/DL
BLOOD, URINE: ABNORMAL
BUN BLDV-MCNC: 13 MG/DL (ref 6–23)
CALCIUM SERPL-MCNC: 9 MG/DL (ref 8.3–10.6)
CANNABINOID SCREEN URINE: NEGATIVE
CHLORIDE BLD-SCNC: 106 MMOL/L (ref 99–110)
CLARITY: CLEAR
CO2: 21 MMOL/L (ref 21–32)
COCAINE METABOLITE: NEGATIVE
COLOR: YELLOW
CREAT SERPL-MCNC: 1.3 MG/DL (ref 0.9–1.3)
DIFFERENTIAL TYPE: ABNORMAL
EOSINOPHILS ABSOLUTE: 0 K/CU MM
EOSINOPHILS RELATIVE PERCENT: 0.2 % (ref 0–3)
GFR AFRICAN AMERICAN: >60 ML/MIN/1.73M2
GFR NON-AFRICAN AMERICAN: 56 ML/MIN/1.73M2
GLUCOSE BLD-MCNC: 125 MG/DL (ref 70–99)
GLUCOSE, URINE: NEGATIVE MG/DL
HCT VFR BLD CALC: 41 % (ref 42–52)
HEMOGLOBIN: 13.7 GM/DL (ref 13.5–18)
IMMATURE NEUTROPHIL %: 0.6 % (ref 0–0.43)
KETONES, URINE: ABNORMAL MG/DL
LACTATE: 1.9 MMOL/L (ref 0.4–2)
LEUKOCYTE ESTERASE, URINE: NEGATIVE
LYMPHOCYTES ABSOLUTE: 0.5 K/CU MM
LYMPHOCYTES RELATIVE PERCENT: 4.1 % (ref 24–44)
MAGNESIUM: 2.5 MG/DL (ref 1.8–2.4)
MCH RBC QN AUTO: 30.2 PG (ref 27–31)
MCHC RBC AUTO-ENTMCNC: 33.4 % (ref 32–36)
MCV RBC AUTO: 90.5 FL (ref 78–100)
MONOCYTES ABSOLUTE: 0.8 K/CU MM
MONOCYTES RELATIVE PERCENT: 6.5 % (ref 0–4)
MUCUS: ABNORMAL HPF
NITRITE URINE, QUANTITATIVE: NEGATIVE
NUCLEATED RBC %: 0 %
OPIATES, URINE: NEGATIVE
OXYCODONE: NEGATIVE
PDW BLD-RTO: 12.7 % (ref 11.7–14.9)
PH, URINE: 6 (ref 5–8)
PHENCYCLIDINE, URINE: NEGATIVE
PLATELET # BLD: 246 K/CU MM (ref 140–440)
PMV BLD AUTO: 9.4 FL (ref 7.5–11.1)
POTASSIUM SERPL-SCNC: 3.9 MMOL/L (ref 3.5–5.1)
PROTEIN UA: NEGATIVE MG/DL
RBC # BLD: 4.53 M/CU MM (ref 4.6–6.2)
RBC URINE: <1 /HPF (ref 0–3)
SEGMENTED NEUTROPHILS ABSOLUTE COUNT: 11 K/CU MM
SEGMENTED NEUTROPHILS RELATIVE PERCENT: 88.3 % (ref 36–66)
SODIUM BLD-SCNC: 137 MMOL/L (ref 135–145)
SPECIFIC GRAVITY UA: 1.01 (ref 1–1.03)
SQUAMOUS EPITHELIAL: <1 /HPF
TOTAL IMMATURE NEUTOROPHIL: 0.07 K/CU MM
TOTAL NUCLEATED RBC: 0 K/CU MM
TOTAL PROTEIN: 7.3 GM/DL (ref 6.4–8.2)
TRICHOMONAS: ABNORMAL /HPF
TROPONIN T: 0.01 NG/ML
UROBILINOGEN, URINE: NORMAL MG/DL (ref 0.2–1)
WBC # BLD: 12.4 K/CU MM (ref 4–10.5)
WBC UA: 1 /HPF (ref 0–2)

## 2020-06-13 PROCEDURE — 6360000002 HC RX W HCPCS: Performed by: EMERGENCY MEDICINE

## 2020-06-13 PROCEDURE — 73130 X-RAY EXAM OF HAND: CPT

## 2020-06-13 PROCEDURE — 70450 CT HEAD/BRAIN W/O DYE: CPT

## 2020-06-13 PROCEDURE — 84484 ASSAY OF TROPONIN QUANT: CPT

## 2020-06-13 PROCEDURE — 83735 ASSAY OF MAGNESIUM: CPT

## 2020-06-13 PROCEDURE — 2580000003 HC RX 258: Performed by: PHYSICIAN ASSISTANT

## 2020-06-13 PROCEDURE — 6370000000 HC RX 637 (ALT 250 FOR IP): Performed by: EMERGENCY MEDICINE

## 2020-06-13 PROCEDURE — U0002 COVID-19 LAB TEST NON-CDC: HCPCS

## 2020-06-13 PROCEDURE — 80307 DRUG TEST PRSMV CHEM ANLYZR: CPT

## 2020-06-13 PROCEDURE — 73110 X-RAY EXAM OF WRIST: CPT

## 2020-06-13 PROCEDURE — 80053 COMPREHEN METABOLIC PANEL: CPT

## 2020-06-13 PROCEDURE — 85025 COMPLETE CBC W/AUTO DIFF WBC: CPT

## 2020-06-13 PROCEDURE — 96375 TX/PRO/DX INJ NEW DRUG ADDON: CPT

## 2020-06-13 PROCEDURE — 93005 ELECTROCARDIOGRAM TRACING: CPT | Performed by: EMERGENCY MEDICINE

## 2020-06-13 PROCEDURE — 99291 CRITICAL CARE FIRST HOUR: CPT

## 2020-06-13 PROCEDURE — 81001 URINALYSIS AUTO W/SCOPE: CPT

## 2020-06-13 PROCEDURE — 2580000003 HC RX 258: Performed by: EMERGENCY MEDICINE

## 2020-06-13 PROCEDURE — 87040 BLOOD CULTURE FOR BACTERIA: CPT

## 2020-06-13 PROCEDURE — 71250 CT THORAX DX C-: CPT

## 2020-06-13 PROCEDURE — 83605 ASSAY OF LACTIC ACID: CPT

## 2020-06-13 PROCEDURE — 72125 CT NECK SPINE W/O DYE: CPT

## 2020-06-13 PROCEDURE — 96374 THER/PROPH/DIAG INJ IV PUSH: CPT

## 2020-06-13 PROCEDURE — 6360000002 HC RX W HCPCS: Performed by: PHYSICIAN ASSISTANT

## 2020-06-13 RX ORDER — SODIUM CHLORIDE 9 MG/ML
INJECTION, SOLUTION INTRAVENOUS CONTINUOUS
Status: DISCONTINUED | OUTPATIENT
Start: 2020-06-13 | End: 2020-06-13 | Stop reason: HOSPADM

## 2020-06-13 RX ORDER — ACETAMINOPHEN 500 MG
1000 TABLET ORAL ONCE
Status: COMPLETED | OUTPATIENT
Start: 2020-06-13 | End: 2020-06-13

## 2020-06-13 RX ORDER — FENTANYL CITRATE 50 UG/ML
25 INJECTION, SOLUTION INTRAMUSCULAR; INTRAVENOUS ONCE
Status: COMPLETED | OUTPATIENT
Start: 2020-06-13 | End: 2020-06-13

## 2020-06-13 RX ORDER — ACETAMINOPHEN 500 MG
TABLET ORAL
Status: DISCONTINUED
Start: 2020-06-13 | End: 2020-06-13 | Stop reason: HOSPADM

## 2020-06-13 RX ORDER — DEXAMETHASONE SODIUM PHOSPHATE 10 MG/ML
10 INJECTION, SOLUTION INTRAMUSCULAR; INTRAVENOUS ONCE
Status: COMPLETED | OUTPATIENT
Start: 2020-06-13 | End: 2020-06-13

## 2020-06-13 RX ADMIN — FENTANYL CITRATE: 50 INJECTION, SOLUTION INTRAMUSCULAR; INTRAVENOUS at 06:34

## 2020-06-13 RX ADMIN — LEVETIRACETAM 1000 MG: 100 INJECTION, SOLUTION INTRAVENOUS at 00:50

## 2020-06-13 RX ADMIN — DEXAMETHASONE SODIUM PHOSPHATE 10 MG: 10 INJECTION, SOLUTION INTRAMUSCULAR; INTRAVENOUS at 06:10

## 2020-06-13 RX ADMIN — SODIUM CHLORIDE: 9 INJECTION, SOLUTION INTRAVENOUS at 06:09

## 2020-06-13 RX ADMIN — ACETAMINOPHEN 1000 MG: 500 TABLET ORAL at 04:36

## 2020-06-13 RX ADMIN — SODIUM CHLORIDE 1000 ML: 9 INJECTION, SOLUTION INTRAVENOUS at 00:51

## 2020-06-13 ASSESSMENT — PAIN SCALES - GENERAL
PAINLEVEL_OUTOF10: 8
PAINLEVEL_OUTOF10: 8
PAINLEVEL_OUTOF10: 6

## 2020-06-13 ASSESSMENT — PAIN DESCRIPTION - ORIENTATION: ORIENTATION: LEFT

## 2020-06-13 ASSESSMENT — PAIN SCALES - WONG BAKER: WONGBAKER_NUMERICALRESPONSE: 8

## 2020-06-13 ASSESSMENT — PAIN DESCRIPTION - LOCATION: LOCATION: LEG

## 2020-06-13 ASSESSMENT — PAIN DESCRIPTION - PAIN TYPE: TYPE: CHRONIC PAIN

## 2020-06-13 NOTE — ED PROVIDER NOTES
Emphysema     GERD (gastroesophageal reflux disease)     No meds as of 2/26/20    HIV disease ([facility] [address] 2005    Follows with Dr. 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 Aries Milligan  20+ yrs ago    Peridontal disease    ENDOSCOPY, COLON, DIAGNOSTIC  5/21/15    egd bx, mild esophagitis, esoph ring, dil, hiatal hernia - Dr. Jami Enciso    10 + yrs ago and 45 yrs ago, right ing hernia repair    TONSILLECTOMY  1960's    WRIST FRACTURE SURGERY Left 2/28/2020    WRIST OPEN REDUCTION INTERNAL FIXATION LEFT performed by Alvin Hidalgo MD at [facility]    Current Outpatient Rx   Medication Sig Dispense Refill    levoFLOXacin (LEVAQUIN) 500 MG tablet Take 1 tablet by mouth daily 10 tablet 0    albuterol-ipratropium (COMBIVENT RESPIMAT)  MCG/ACT AERS inhaler Inhale 1 puff into the lungs every 6 hours as needed for Wheezing 4 g 3    budesonide (PULMICORT FLEXHALER) 180 MCG/ACT AEPB inhaler Inhale 2 puffs into the lungs 2 times daily. 1 each 3    theophylline (UNIPHYL) 400 MG extended release tablet Take one half tablet by mouth twice daily 90 tablet 3    Abacavir-Dolutegravir-Lamivud (TRIUMEQ) 600- MG TABS Take 1 tablet by mouth daily 30 tablet 5    albuterol sulfate  (90 BASE) MCG/ACT inhaler Inhale 2 puffs into the lungs every 4 hours as needed for Wheezing      Respiratory Therapy Supplies (NEBULIZER/TUBING/MOUTHPIECE) KIT 1 kit by Does not apply route daily as needed. Dx copd 496 1 kit 0    zolpidem (AMBIEN) 10 MG tablet Take 10 mg by mouth nightly as needed.          ALLERGIES    No Known Allergies    SOCIAL AND FAMILY HISTORY    Social History     Socioeconomic History    Marital status: Single     Spouse name: Not on file    HISTORY: Pneumonia of right middle lobe due to infectious organism [facility]) TECHNOLOGIST PROVIDED HISTORY: Reason for Exam: f/u pneumonia, emphysema Acuity: Acute Type of Exam: Subsequent/Follow-up Additional signs and symptoms: f/u pneumonia, emphysema Relevant Medical/Surgical History: f/u pneumonia, emphysema FINDINGS: There is redemonstration of an infiltrate in the right middle lobe also previously noted. This is not resolved. Heart and mediastinum appear stable. Mild hyperinflation with COPD. No active pleural disease. Redemonstration of a persistent right middle lobe infiltrate. Xr Wrist Left (min 3 Views)    Result Date: 6/13/2020  EXAMINATION: 3 XRAY VIEWS OF THE LEFT WRIST 6/13/2020 12:46 am COMPARISON: 05/26/2020 HISTORY: ORDERING SYSTEM PROVIDED HISTORY: injury TECHNOLOGIST PROVIDED HISTORY: Reason for exam:->injury Reason for Exam: Injury Acuity: Acute Type of Exam: Initial Acute wrist injury. Initial exam. FINDINGS: Status post ORIF of the distal left radius with a volar fixation plate. Alignment appears stable. Fracture lucencies remain. There is a displaced ulnar styloid fracture, similar to the prior exam.  The bones are under mineralized. There is diffuse soft tissue swelling. 1. Stable alignment of the distal left radius status post ORIF. No evidence of hardware complication. 2. Stable displaced ulnar styloid fracture.  3. Diffuse soft tissue swelling, new from the prior exam.     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 Please see same day wrist radiographs for further details. Xr Hand Right (min 3 Views)    Result Date: 6/13/2020  EXAMINATION: THREE XRAY VIEWS OF THE RIGHT HAND 6/13/2020 12:45 am COMPARISON: None. HISTORY: ORDERING SYSTEM PROVIDED HISTORY: injury TECHNOLOGIST PROVIDED HISTORY: Reason for exam:->injury Reason for Exam: Injury Acuity: Acute Type of Exam: Initial FINDINGS: Osseous structures are normally aligned. Joint spaces are preserved. Questionable nondisplaced fracture seen through the waist of the navicular bone no foreign bodies are present. Mild osteoarthritic changes seen involving the 1st carpometacarpal joint, and intercarpal joint between the trapezium and navicular. Questionable nondisplaced navicular waist fracture. Carpal bones are better visualized on the dedicated right wrist series     Ct Head Wo Contrast    Result Date: 6/13/2020  EXAMINATION: CT OF THE HEAD WITHOUT CONTRAST  6/13/2020 2:57 am TECHNIQUE: CT of the head was performed without the administration of intravenous contrast. Dose modulation, iterative reconstruction, and/or weight based adjustment of the mA/kV was utilized to reduce the radiation dose to as low as reasonably achievable. COMPARISON: 05/09/2017 HISTORY: ORDERING SYSTEM PROVIDED HISTORY: seizure, hx HIV, TECHNOLOGIST PROVIDED HISTORY: Has a \"code stroke\" or \"stroke alert\" been called? ->No Reason for exam:->seizure, hx HIV, Reason for Exam: ams Acuity: Acute Type of Exam: Initial Additional signs and symptoms: pt states had previous stroke Relevant Medical/Surgical History: weakness FINDINGS: BRAIN/VENTRICLES: There is vasogenic edema in the left parietal lobe, which is new from 2017. No significant mass effect or midline shift. No intracranial hemorrhage. The posterior fossa is unremarkable. ORBITS: The visualized portion of the orbits demonstrate no acute abnormality.  SINUSES: The visualized paranasal sinuses and mastoid air cells demonstrate no acute abnormality. SOFT TISSUES/SKULL:  No acute abnormality of the visualized skull or soft tissues. 1. Vasogenic edema in the left parietal lobe, concerning for underlying mass. Further evaluation with contrast-enhanced brain MRI is recommended. 2. No intracranial hemorrhage. Results of this examination were verbally communicated to Andrey Mckay at 4:57 a.m. on 06/13/2020     Ct Chest Wo Contrast    Result Date: 6/13/2020  EXAMINATION: CT OF THE CHEST WITHOUT CONTRAST 6/13/2020 2:58 am TECHNIQUE: CT of the chest was performed without the administration of intravenous contrast. Multiplanar reformatted images are provided for review. Dose modulation, iterative reconstruction, and/or weight based adjustment of the mA/kV was utilized to reduce the radiation dose to as low as reasonably achievable. COMPARISON: 08/29/2016 HISTORY: ORDERING SYSTEM PROVIDED HISTORY: PNA? TECHNOLOGIST PROVIDED HISTORY: Reason for exam:->PNA? Initial examination. FINDINGS: Mediastinum: The heart is normal in size. There is no pericardial effusion. The thoracic aorta is normal in course and caliber. A 1.8 cm subcarinal lymph node is demonstrated. A 1.4 cm superior mediastinal lymph node is decreased in size from 2016. Lungs/pleura: There is severe emphysema. There is masslike consolidation of the right middle lobe, which is collapsed. The airways to the right middle lobe are occluded. There is a 1.0 x 1.6 cm partially cystic left upper lobe nodule. No pleural effusion or pneumothorax. Upper Abdomen: There is an indeterminate 1.3 cm cyst in the upper pole of the left kidney, which appears increased in size from 2015. Soft Tissues/Bones: No suspicious osteolytic or osteoblastic lesions are demonstrated. 1. Postobstructive collapse/consolidation of the right middle lobe. Further evaluation with bronchoscopy should be considered as an endobronchial lesion cannot be excluded. 2. Part solid 10 x 16 mm left upper lobe nodule, which could be infectious, inflammatory, or neoplastic. Management guidelines of pulmonary nodules is provided below. 3. Enlarged subcarinal lymph node, which is either reactive or metastatic. 4. Indeterminate 1.3 cm cystic nodule in the upper pole of the left kidney. Further evaluation with renal protocol MRI is recommended. RECOMMENDATIONS: Fleischner Society guidelines for follow-up and management of incidentally detected pulmonary nodules: Single Solid Nodule: Nodule size less than 6 mm In a low-risk patient, no routine follow-up. In a high-risk patient, optional CT at 12 months. Nodule size equals 6-8 mm In a low-risk patient, CT at 6-12 months, then consider CT at 18-24 months. In a high-risk patient, CT at 6-12 months, then CT at 18-24 months. Nodule size greater than 8 mm In a low-risk patient, consider CT at 3 months, PET/CT, or tissue sampling. In a high-risk patient, consider CT at 3 months, PET/CT, or tissue sampling. - Low risk patients include individuals with minimal or absent history of smoking and other known risk factors. - High risk patients include individuals with a history or smoking or known risk factors. Radiology 2017 http://pubs. rsna.org/doi/full/10.1148/radiol. 3066610021     Ct Cervical Spine Wo Contrast    Result Date: 6/13/2020  EXAMINATION: CT OF THE CERVICAL SPINE WITHOUT CONTRAST 6/13/2020 2:58 am TECHNIQUE: CT of the cervical spine was performed without the administration of intravenous contrast. Multiplanar reformatted images are provided for review. Dose modulation, iterative reconstruction, and/or weight based adjustment of the mA/kV was utilized to reduce the radiation dose to as low as reasonably achievable. COMPARISON: None. HISTORY: ORDERING SYSTEM PROVIDED HISTORY: injury TECHNOLOGIST PROVIDED HISTORY: Reason for exam:->injury Acute neck injury. Initial examination.  FINDINGS: BONES/ALIGNMENT: There is no acute fracture or traumatic malalignment. DEGENERATIVE CHANGES: There is mild-to-moderate multilevel degenerative disc disease, most notable C5/C6 and C6/C7. SOFT TISSUES: There is advanced emphysema in the upper lungs. No acute osseous abnormality of the cervical spine.       -----------------------------------------------------------      CRITICAL CARE NOTE:  There was a high probability of clinically significant life-threatening deterioration of the patient's condition requiring my urgent intervention due to AMS, new onset seizure and suspected infectious vs malignant cause, vasogenic edema. Labs, imaging, IV decadron, keppra was performed to address this. Assement of the patient, including exam, was done simultaneously during critical care actions. Total critical care time is at least  40 minutes. This includes vital sign monitoring, pulse oximetry monitoring, telemetry monitoring, clinical response to the IV medications, reviewing the nursing notes, consultation time, dictation/documentation time, and interpretation of the lab work. This time excludes family discussion time and time spent performing separately billable procedure(s) (see below for more details)      ----------------------------------------------------------                 ED COURSE & MEDICAL DECISION MAKING       Vital signs and nursing notes reviewed during ED course. Patient care and presentation staffed with supervising MD.   Patient seen by supervising MD today. All pertinent Lab data and radiographic results reviewed with patient at bedside. The patient and/or the family were informed of the results of any tests/labs/imaging, the treatment plan, and time was allotted to answer questions. Clinical  IMPRESSION    1. Seizure ([facility] [address]    2. Human immunodeficiency virus (HIV) disease ([facility] [address]    3. Elevated troponin    4. Abnormal CT of the chest    5. Vasogenic brain edema [facility])        Patient presents as above.   On my evaluation he is oriented to person and place. He has had some urinary incontinence in his tongue and evidence of seizure. Seizure was witnessed by sister. He has no history of seizures. He woke up feeling off and has been off all day today. Last known well unknown. Patient does have history of HIV. At this time chief concerns are for an infectious or malignant cause. He is afebrile. He had no further seizure activity here in the ED and continue to answer questions appropriately. Work-up is as above. Small bump in troponin. Patient has nodules and suspicious for malignancy findings on CT of the chest.  He also has vasogenic brain edema and suspected underlying malignancy. Patient was given Keppra here. Also given dose of IV Decadron. Discussed results with patient and sister and plan for transfer. Patient prefers [place]. Patient will be transferred to ED there under accepting physician Dr. Stephanie Glover: Please note this report has been produced using speech recognition software and may contain errors related to that system including errors in grammar, punctuation, and spelling, as well as words and phrases that may be inappropriate. If there are any questions or concerns please feel free to contact the dictating provider for clarification.         Sofia Morris PA-C  06/14/20 [id number]

## 2020-06-15 LAB
EKG ATRIAL RATE: 102 BPM
EKG DIAGNOSIS: NORMAL
EKG P AXIS: 58 DEGREES
EKG P-R INTERVAL: 136 MS
EKG Q-T INTERVAL: 362 MS
EKG QRS DURATION: 96 MS
EKG QTC CALCULATION (BAZETT): 471 MS
EKG R AXIS: 10 DEGREES
EKG T AXIS: 64 DEGREES
EKG VENTRICULAR RATE: 102 BPM
SARS-COV-2: NOT DETECTED
SOURCE: NORMAL

## 2020-06-15 PROCEDURE — 93010 ELECTROCARDIOGRAM REPORT: CPT | Performed by: INTERNAL MEDICINE

## 2020-06-18 LAB
CULTURE: NORMAL
CULTURE: NORMAL
Lab: NORMAL
Lab: NORMAL
SPECIMEN: NORMAL
SPECIMEN: NORMAL

## 2020-06-22 LAB
A/G RATIO: 0
ALBUMIN SERPL-MCNC: 3.8 G/DL
ALP BLD-CCNC: 101 U/L
ALT SERPL-CCNC: 19 U/L
AST SERPL-CCNC: 25 U/L
BASOPHILS ABSOLUTE: 0 /ΜL
BASOPHILS RELATIVE PERCENT: 0.4 %
BILIRUB SERPL-MCNC: 0.3 MG/DL (ref 0.1–1.4)
BILIRUBIN DIRECT: 0 MG/DL
BILIRUBIN, INDIRECT: 0
BILIRUBIN, URINE: NEGATIVE
BLOOD, URINE: NEGATIVE
BUN BLDV-MCNC: 21 MG/DL
CALCIUM SERPL-MCNC: 8.9 MG/DL
CHLORIDE BLD-SCNC: 99 MMOL/L
CLARITY: CLEAR
CO2: 28 MMOL/L
COLOR: YELLOW
CREAT SERPL-MCNC: 1.2 MG/DL
EOSINOPHILS ABSOLUTE: 0 /ΜL
EOSINOPHILS RELATIVE PERCENT: 0.2 %
GFR CALCULATED: 64
GLOBULIN: 0
GLUCOSE BLD-MCNC: 77 MG/DL
GLUCOSE URINE: NEGATIVE
HCT VFR BLD CALC: 43.1 % (ref 41–53)
HEMOGLOBIN: 14.3 G/DL (ref 13.5–17.5)
KETONES, URINE: NEGATIVE
LEUKOCYTE ESTERASE, URINE: NEGATIVE
LYMPHOCYTES ABSOLUTE: 0.9 /ΜL
LYMPHOCYTES RELATIVE PERCENT: 8 %
MCH RBC QN AUTO: 30 PG
MCHC RBC AUTO-ENTMCNC: 33.1 G/DL
MCV RBC AUTO: 90.6 FL
MONOCYTES ABSOLUTE: 1 /ΜL
MONOCYTES RELATIVE PERCENT: 8.2 %
NEUTROPHILS ABSOLUTE: 9.8 /ΜL
NEUTROPHILS RELATIVE PERCENT: 83.2 %
NITRITE, URINE: NEGATIVE
PDW BLD-RTO: 13 %
PH UA: 7 (ref 4.5–8)
PLATELET # BLD: 327 K/ΜL
PMV BLD AUTO: 0 FL
POTASSIUM SERPL-SCNC: 4.5 MMOL/L
PROTEIN TOTAL: 0 G/DL
PROTEIN UA: NEGATIVE
RBC # BLD: 4.76 10^6/ΜL
SODIUM BLD-SCNC: 139 MMOL/L
SPECIFIC GRAVITY, URINE: 1.01
UROBILINOGEN, URINE: NORMAL
WBC # BLD: 11.8 10^3/ML

## 2020-07-06 ENCOUNTER — HOSPITAL ENCOUNTER (OUTPATIENT)
Dept: RADIATION ONCOLOGY | Age: 65
Discharge: HOME | End: 2020-07-06
Payer: COMMERCIAL

## 2020-07-06 ENCOUNTER — HOSPITAL ENCOUNTER (OUTPATIENT)
Dept: INFUSION THERAPY | Age: 65
Discharge: HOME | End: 2020-07-06
Payer: COMMERCIAL

## 2020-07-06 VITALS
WEIGHT: 191.69 LBS | HEART RATE: 87 BPM | RESPIRATION RATE: 16 BRPM | BODY MASS INDEX: 29.05 KG/M2 | OXYGEN SATURATION: 98 % | DIASTOLIC BLOOD PRESSURE: 57 MMHG | HEIGHT: 68 IN | SYSTOLIC BLOOD PRESSURE: 119 MMHG | TEMPERATURE: 98.8 F

## 2020-07-06 PROBLEM — C79.31 SECONDARY MALIGNANT NEOPLASM OF BRAIN (HCC): Status: ACTIVE | Noted: 2020-07-06

## 2020-07-06 PROBLEM — C34.2: Status: ACTIVE | Noted: 2020-07-06

## 2020-07-06 PROCEDURE — 99201 HC NEW PT, OUTPT VISIT LEVEL 1: CPT

## 2020-07-06 PROCEDURE — 99202 OFFICE O/P NEW SF 15 MIN: CPT | Performed by: RADIOLOGY

## 2020-07-06 RX ORDER — GUAIFENESIN 600 MG/1
1200 TABLET, EXTENDED RELEASE ORAL 2 TIMES DAILY
COMMUNITY

## 2020-07-06 ASSESSMENT — PAIN DESCRIPTION - ONSET: ONSET: ON-GOING

## 2020-07-06 ASSESSMENT — PAIN DESCRIPTION - DESCRIPTORS: DESCRIPTORS: BURNING;ACHING;CONSTANT;SHOOTING;SHARP

## 2020-07-06 ASSESSMENT — PAIN - FUNCTIONAL ASSESSMENT: PAIN_FUNCTIONAL_ASSESSMENT: PREVENTS OR INTERFERES SOME ACTIVE ACTIVITIES AND ADLS

## 2020-07-06 ASSESSMENT — PAIN DESCRIPTION - FREQUENCY: FREQUENCY: CONTINUOUS

## 2020-07-06 ASSESSMENT — PAIN DESCRIPTION - PAIN TYPE: TYPE: CHRONIC PAIN

## 2020-07-06 ASSESSMENT — PAIN DESCRIPTION - LOCATION: LOCATION: FOOT

## 2020-07-06 ASSESSMENT — PAIN DESCRIPTION - ORIENTATION: ORIENTATION: RIGHT;LEFT

## 2020-07-06 ASSESSMENT — PAIN DESCRIPTION - PROGRESSION: CLINICAL_PROGRESSION: NOT CHANGED

## 2020-07-06 ASSESSMENT — PAIN SCALES - GENERAL: PAINLEVEL_OUTOF10: 5

## 2020-07-06 NOTE — CONSULTS
Radiation Oncology Consultation  Encounter Date: 2020 10:12 AM    Mr. Kimmy Samuel is a 59 y.o. male  : 1955  MRN: [id number]  Acct Number: [de-identified]  Requesting Provider: Shruthi Rueda M.D.   [address] [facility], [address] [facility],Third [address]             CONSULTANT: Jenise Subramanian    PHYSICIANS:   Primary Care: Aruna Cai MD   Medical Oncologist: Shruthi Rueda M.D.   [address] [facility], [address] [facility],Third [address]         Surgeon: Dr. Lauro Pang  Pulmonology: DM[address]RY [address] Dr.      DIAGNOSIS:      Cancer Staging  Primary malignant neoplasm of bronchus of right middle lobe [facility])  Staging form: Lung, AJCC 8th Edition  - Clinical: Stage IV (pM1) - Signed by Jenise Subramanian MD on 2020         TREATMENT COURSE:     Secondary malignant neoplasm of brain ([facility] [address]    2020 Initial Diagnosis     Secondary malignant neoplasm of brain [facility])      2020 Surgery     Resection left parietal metastasis           HPI:   Today I had the privilege of seeing Kimmy Samuel in consultation. As you recall, Kimmy Samuel is a 59 y.o. male HIV-positive who was recently found to have metastatic non-small cell lung cancer with brain metastasis. Reports he had been feeling fairly good overall, until developing the sudden onset of seizure on 20. He reports he was at home and states he felt \"like he had been troubled\". He called his sister who came over and found him having tonic-clonic activity of all 4 extremities and being unresponsive. He reports he does not remember anything until after his recent brain surgery-see below. He states he has lost a few pounds, which he has been regained. He reports has some chronic wheezing and shortness of breath which he attributes to his COPD-controlled with inhalers. Imaging was performed at [facility] as below.   He was subsequently transferred to [facility] were he underwent bronchoscopy. Bronchoscopy at [place] was performed on 6/15/20 revealing:  Impression:           - Abnormal CT scan of chest                        - The airway examination was normal except RML                         lateral segment collapse                        - A transbronchial needle aspiration was performed.                        - Endobronchial ultrasound was performed. Subcarinal lymph node biopsy and [address] [place] History 59year old male with HIV + with RML lung mass, + 1.2cm brain mass     CYTOLOGIC DIAGNOSIS A. SUBCARINAL LYMPH NODE, FNA (CYTOLOGY AND CELL BLOCK): FINAL DIAGNOSIS:  Adenocarcinoma of lung, see note. Note:  Immunohistochemistry performed on the cell block are as follows:  Positive: CK7, TTF-1 and NapsinA  Negative: CK20 and p40      He underwent imaging at [place] as below as well as resection of the left parietal metastasis confirming metastatic adenocarcinoma consistent with lung primary. He presents today for consideration of his treatment options. He reports having chronic neuropathy of his feet as well as chronic foot weakness which he attributes to his HIV/HIV medications without acute changes. Postoperatively, he reports some numbness of his tongue. He has not noticed any new lumps or bumps anywhere throughout his body. He denies any headaches, nausea, or vomiting. He denies the new onset of bony pain. He has not noticed any new lumps or bumps anywhere throughout his body.       Past Medical History:   Diagnosis Date    COPD (chronic obstructive pulmonary disease) ([facility] [address]     Dr. Angelica Grigsby Emphysema     GERD (gastroesophageal reflux disease)     No meds as of 2/26/20    HIV disease ([facility] [address] 2005    Follows with Dr. 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 Boone Hardy  20+ yrs ago    Peridontal disease    ENDOSCOPY, COLON, DIAGNOSTIC  5/21/15    egd bx, mild esophagitis, esoph ring, dil, hiatal hernia - Dr. Justice Hector    10 + yrs ago and 39 yrs ago, right ing hernia repair    TONSILLECTOMY  's    WRIST FRACTURE SURGERY Left 2020    WRIST OPEN REDUCTION INTERNAL FIXATION LEFT performed by Leslye Loya MD at [address] [place] History   Problem Relation Age of Onset    Cancer Mother         breast ca    Heart Disease Father     High Cholesterol Sister     Cancer Maternal Uncle         Melanoma    Cancer Maternal Grandmother         Lung (Smoker)    Heart Disease Maternal Grandmother     Cancer Maternal Grandfather         Leukemia    Heart Disease Maternal Grandfather     Heart Disease Paternal Grandmother     Heart Disease Paternal Grandfather        Social History     Socioeconomic History    Marital status: Single     Spouse name: Not on file    Number of children: Not on file    Years of education: Not on file    Highest education level: Not on file   Occupational History    Occupation: retired/   Social Needs    Financial resource strain: Not on file    Food insecurity     Worry: Not on file     Inability: Not on file    Transportation needs     Medical: Not on file     Non-medical: Not on file   Tobacco Use    Smoking status: Former Smoker     Packs/day: 1.00     Years: 32.00     Pack years: 32.00     Last attempt to quit: 2004     Years since quittin.2    Smokeless tobacco: Never Used   Substance and Sexual Activity    Alcohol use: No    Drug use: No    Sexual activity: Not Currently     Partners: Female, Male   Lifestyle    Physical activity     Days per week: Not on file     Minutes per session: Not on file    Stress: Not on file   Relationships    Social connections     Talks on phone: 06/13/2020    MCV 90.5 06/13/2020    MCH 30.2 06/13/2020    MCHC 33.4 06/13/2020    RDW 12.7 06/13/2020     06/13/2020    MPV 9.4 06/13/2020     CMP:  Lab Results   Component Value Date     06/13/2020    K 3.9 06/13/2020     06/13/2020    CO2 21 06/13/2020    BUN 13 06/13/2020    CREATININE 1.3 06/13/2020    GFRAA >60 06/13/2020    LABGLOM 56 06/13/2020    GLUCOSE 125 06/13/2020    PROT 7.3 06/13/2020    PROT 6.9 03/13/2013    LABALBU 3.7 06/13/2020    CALCIUM 9.0 06/13/2020    BILITOT 0.2 06/13/2020    ALKPHOS 131 06/13/2020    AST 19 06/13/2020    ALT 9 06/13/2020     Onc labs:   Lab Results   Component Value Date    PSA 2.03 03/13/2013    CEA 3.4 08/30/2016       PATHOLOGY:   6/13/20 [facility]  Pathologic Diagnosis A. Left parietal tumor, biopsy:   · Metastatic adenocarcinoma; see Comment    B. Left parietal tumor, excision:   · Metastatic adenocarcinoma; see Comment    Lab Use Only: JobID [id number]     Diagnosis Comments Immunohistochemical stains are performed and evaluated at [facility] on block B2 and include CAM 5.2, CK7, CK20, p40, TTF-1, napsin A, and CDX2. Supporting a diagnosis of metastatic adenocarcinoma is the diffuse positive staining for CAM 5.2 in tumor cells. Tumor cells are CK7 positive, TTF-1 positive, napsin A positive, CK20 negative, and CDX2 negative. Less than 2% of the cells show nuclear p40 immunoreactivity. This immunophenotype is most consistent with metastatic adenocarcinoma of lung primary. Additional testing for molecular evaluation and PD-L1 status will be performed and reported separately. RADIOLOGIC STUDIES:     MRI Brain [facility] 7/2/20  IMPRESSION:    Postsurgical changes related to mass resection in the left parietal lobe. There is small rim of contrast enhancement along the resection cavity margin  which may be treatment related, and attention on follow-up imaging is  recommended. Surrounding vasogenic edema and mass effect have improved.     New tiny focus of restricted diffusion in the left occipital lobe which could  be a recent infarct (acute to subacute). No hemorrhage or mass effect  associated with this. EXAM:  CT HEAD WITHOUT CONTRAST, 6/17/2020  IMPRESSION:    New postsurgical changes involving the left parietal region as detailed above. Heterogeneous density within the surgical defect and adjacent extra-axial  collection represent small amount of blood products and other postsurgical  change. The surrounding vasogenic edema, mass effect and mild midline shift  appears similar to the preoperative exam. No other interval changes are  demonstrated. EXAM:  MRI BRAIN WITH AND WITHOUT CONTRAST, 06/14/2020   IMPRESSION:    There is a 1.2 cm peripherally enhancing lesion at the left parieto-occipital  sulcus centered in the left parietal lobe with extensive surrounding vasogenic  edema involving both left parietal and occipital lobes. There is associated  regional mass effect and effacement of the atrium of left lateral ventricle. There is no restricted diffusion at this site. Findings are worrisome for  intracranial metastases. EXAM: CT ABDOMEN/PELVIS WITH CONTRAST, 06/13/2020  IMPRESSION:   1.  Large, partially imaged right middle lobe mass is highly suspicious for a  primary lung cancer or less likely metastatic disease. 2.  No findings of primary or secondary malignancy in the abdomen or pelvis. Xr Wrist Left (min 3 Views)    Result Date: 6/13/2020  EXAMINATION: 3 XRAY VIEWS OF THE LEFT WRIST 6/13/2020 12:46 am COMPARISON: 05/26/2020 HISTORY: ORDERING SYSTEM PROVIDED HISTORY: injury TECHNOLOGIST PROVIDED HISTORY: Reason for exam:->injury Reason for Exam: Injury Acuity: Acute Type of Exam: Initial Acute wrist injury. Initial exam. FINDINGS: Status post ORIF of the distal left radius with a volar fixation plate. Alignment appears stable. Fracture lucencies remain.   There is a displaced ulnar styloid fracture, similar to the prior exam.  The bones are under mineralized. There is diffuse soft tissue swelling. 1. Stable alignment of the distal left radius status post ORIF. No evidence of hardware complication. 2. Stable displaced ulnar styloid fracture. 3. Diffuse soft tissue swelling, new from the prior exam.     Xr Wrist Right (min 3 Views)    Result Date: 6/13/2020  EXAMINATION: 3. XRAY VIEWS OF THE RIGHT WRIST 6/13/2020 12:46 am COMPARISON: Right hand series dated June 13, 2020 HISTORY: ORDERING SYSTEM PROVIDED HISTORY: injury TECHNOLOGIST PROVIDED HISTORY: Reason for exam:->injury Reason for Exam: Injury Acuity: Acute Type of Exam: Initial FINDINGS: Osseous structures are normally aligned. No evidence of an acute fracture or dislocation is present. Nonspecific cyst formation is seen within the triquetrum bone. Normal alignment is maintained on the lateral view. Mild osteoarthritic changes seen involving the 1st carpometacarpal joint, and intercarpal joint between the navicular and trapezium. No evidence of an acute fracture involving the right wrist     Xr Hand Left (min 3 Views)    Result Date: 6/13/2020  EXAMINATION: THREE XRAY VIEWS OF THE LEFT HAND 6/13/2020 12:46 am COMPARISON: Same day wrist radiographs HISTORY: ORDERING SYSTEM PROVIDED HISTORY: Injury TECHNOLOGIST PROVIDED HISTORY: Reason for exam:->Injury Reason for Exam: Injury Acuity: Acute Type of Exam: Initial FINDINGS: Distal radial and ulnar fractures with plate and screw fixation along the included radius. Overlying soft tissue swelling. Bones of the hand are intact and in anatomic alignment. Joint spaces are maintained. No acute fracture or malalignment of the left hand. Distal radial and ulnar fractures with postsurgical changes and soft tissue swelling appreciated. Please see same day wrist radiographs for further details. Xr Hand Right (min 3 Views)    Result Date: 6/13/2020  EXAMINATION: THREE XRAY VIEWS OF THE RIGHT HAND 6/13/2020 12:45 am COMPARISON: None. HISTORY: ORDERING SYSTEM PROVIDED HISTORY: injury TECHNOLOGIST PROVIDED HISTORY: Reason for exam:->injury Reason for Exam: Injury Acuity: Acute Type of Exam: Initial FINDINGS: Osseous structures are normally aligned. Joint spaces are preserved. Questionable nondisplaced fracture seen through the waist of the navicular bone no foreign bodies are present. Mild osteoarthritic changes seen involving the 1st carpometacarpal joint, and intercarpal joint between the trapezium and navicular. Questionable nondisplaced navicular waist fracture. Carpal bones are better visualized on the dedicated right wrist series     Ct Head Wo Contrast    Result Date: 6/13/2020  EXAMINATION: CT OF THE HEAD WITHOUT CONTRAST  6/13/2020 2:57 am TECHNIQUE: CT of the head was performed without the administration of intravenous contrast. Dose modulation, iterative reconstruction, and/or weight based adjustment of the mA/kV was utilized to reduce the radiation dose to as low as reasonably achievable. COMPARISON: 05/09/2017 HISTORY: ORDERING SYSTEM PROVIDED HISTORY: seizure, hx HIV, TECHNOLOGIST PROVIDED HISTORY: Has a \"code stroke\" or \"stroke alert\" been called? ->No Reason for exam:->seizure, hx HIV, Reason for Exam: ams Acuity: Acute Type of Exam: Initial Additional signs and symptoms: pt states had previous stroke Relevant Medical/Surgical History: weakness FINDINGS: BRAIN/VENTRICLES: There is vasogenic edema in the left parietal lobe, which is new from 2017. No significant mass effect or midline shift. No intracranial hemorrhage. The posterior fossa is unremarkable. ORBITS: The visualized portion of the orbits demonstrate no acute abnormality. SINUSES: The visualized paranasal sinuses and mastoid air cells demonstrate no acute abnormality. SOFT TISSUES/SKULL:  No acute abnormality of the visualized skull or soft tissues. 1. Vasogenic edema in the left parietal lobe, concerning for underlying mass.  Further evaluation with contrast-enhanced brain MRI is recommended. 2. No intracranial hemorrhage. Results of this examination were verbally communicated to Hubert Wolfe at 4:57 a.m. on 06/13/2020       Ct Cervical Spine Wo Contrast    Result Date: 6/13/2020  EXAMINATION: CT OF THE CERVICAL SPINE WITHOUT CONTRAST 6/13/2020 2:58 am TECHNIQUE: CT of the cervical spine was performed without the administration of intravenous contrast. Multiplanar reformatted images are provided for review. Dose modulation, iterative reconstruction, and/or weight based adjustment of the mA/kV was utilized to reduce the radiation dose to as low as reasonably achievable. COMPARISON: None. HISTORY: ORDERING SYSTEM PROVIDED HISTORY: injury TECHNOLOGIST PROVIDED HISTORY: Reason for exam:->injury Acute neck injury. Initial examination. FINDINGS: BONES/ALIGNMENT: There is no acute fracture or traumatic malalignment. DEGENERATIVE CHANGES: There is mild-to-moderate multilevel degenerative disc disease, most notable C5/C6 and C6/C7. SOFT TISSUES: There is advanced emphysema in the upper lungs. No acute osseous abnormality of the cervical spine. CT Chest w/o contrast:   Results for orders placed during the hospital encounter of 06/13/20   CT CHEST WO CONTRAST    Narrative EXAMINATION:  CT OF THE CHEST WITHOUT CONTRAST 6/13/2020 2:58 am    TECHNIQUE:  CT of the chest was performed without the administration of intravenous  contrast. Multiplanar reformatted images are provided for review. Dose  modulation, iterative reconstruction, and/or weight based adjustment of the  mA/kV was utilized to reduce the radiation dose to as low as reasonably  achievable. COMPARISON:  08/29/2016    HISTORY:  ORDERING SYSTEM PROVIDED HISTORY: PNA? TECHNOLOGIST PROVIDED HISTORY:  Reason for exam:->PNA? Initial examination. FINDINGS:  Mediastinum: The heart is normal in size. There is no pericardial effusion. The thoracic aorta is normal in course and caliber.   A 1.8 cm subcarinal  lymph node is demonstrated. A 1.4 cm superior mediastinal lymph node is  decreased in size from 2016. Lungs/pleura: There is severe emphysema. There is masslike consolidation of  the right middle lobe, which is collapsed. The airways to the right middle  lobe are occluded. There is a 1.0 x 1.6 cm partially cystic left upper lobe  nodule. No pleural effusion or pneumothorax. Upper Abdomen: There is an indeterminate 1.3 cm cyst in the upper pole of the  left kidney, which appears increased in size from 2015. Soft Tissues/Bones: No suspicious osteolytic or osteoblastic lesions are  demonstrated. Impression 1. Postobstructive collapse/consolidation of the right middle lobe. Further  evaluation with bronchoscopy should be considered as an endobronchial lesion  cannot be excluded. 2. Part solid 10 x 16 mm left upper lobe nodule, which could be infectious,  inflammatory, or neoplastic. Management guidelines of pulmonary nodules is  provided below. 3. Enlarged subcarinal lymph node, which is either reactive or metastatic. 4. Indeterminate 1.3 cm cystic nodule in the upper pole of the left kidney. Further evaluation with renal protocol MRI is recommended. RECOMMENDATIONS:  Fleischner Society guidelines for follow-up and management of incidentally  detected pulmonary nodules:    Single Solid Nodule:    Nodule size less than 6 mm  In a low-risk patient, no routine follow-up. In a high-risk patient, optional CT at 12 months. Nodule size equals 6-8 mm  In a low-risk patient, CT at 6-12 months, then consider CT at 18-24 months. In a high-risk patient, CT at 6-12 months, then CT at 18-24 months. Nodule size greater than 8 mm      In a low-risk patient, consider CT at 3 months, PET/CT, or tissue sampling.     In a high-risk patient, consider CT at 3 months, PET/CT, or tissue sampling.    - Low risk patients include individuals with minimal or absent history of  smoking and other known feet.  Muscle strength is 5/5 in both the upper and lower extremities. IMPRESSION/PLAN:  Gerda Leavitt is a 59 y.o. male who was recently found to have stage IV adenocarcinoma of lung with left parietal metastasis status post resection. I have reviewed the patient's radiographic images. The treatment options were discussed in detail with the patient. I do recommend he proceed with stereotactic radiosurgery to the left parietal tumor bed with simulation scheduled [facility] at [facility]. The role of RT to consolidate his chest disease after chemo was discussed at length with the patient. The potential acute side effects and chronic complications of radiation therapy to the chest were discussed in detail with the patient. The patient voiced understanding, and the patient's questions were answered. The patient has decided to proceed in this fashion. I will obtain a PET CT at this time and will await Dr. Severo Maya systemic therapy recommendations with reassessment after for consideration of consolidative chest RT and assessment of the 1.6cm left upper lobe nodule. Thank-you for allowing me to participate in the care of this very pleasant patient.           Electronically signed by Electronically signed by Chloe Hall MD on 7/6/2020 at 10:12 AM

## 2020-07-06 NOTE — PROGRESS NOTES
Sujata Bindu  7/6/2020    CONSULT / Lung Ca with brain Mets    Vitals:    07/06/20 1308   BP: (!) 119/57   Pulse: 87   Resp: 16   Temp: 98.8 °F (37.1 °C)   SpO2: 98%      Wt Readings from Last 3 Encounters:   07/06/20 191 lb 11 oz (87 kg)   06/29/20 191 lb (86.6 kg)   06/13/20 195 lb (88.5 kg)        Pain Assessment  Pain Assessment: 0-10  Pain Level: 5  Patient's Stated Pain Goal: No pain  Pain Type: Chronic pain  Pain Location: Foot  Pain Orientation: Right, Left  Pain Descriptors: Burning, Aching, Constant, Shooting, Sharp  Pain Frequency: Continuous  Pain Onset: On-going  Clinical Progression: Not changed  Functional Pain Assessment: Prevents or interferes some active activities and ADLs  Non-Pharmaceutical Pain Intervention(s): Repositioned, Rest  Response to Pain Intervention: None  Multiple Pain Sites: No  Denies Need for Intervention- has tried different meds without success. Fall Risk:    Fall risk  Impaired/Unsteady Gait, History of Falls, Dizziness, Numbness of Feet, Shortness of Breath, Assist with Transfer/Ambulation, Provide Wheelchair PRN, Educate on Assistive Devices and Re-Evaluate Weekly    Nutritional Alteration:  None  No Difficulties Swallowing  Voices Sufficient Oral Intake    Mediport: no    Pacemaker/ICD: no    Implants: yes, left Wrist hardware    Previous XRT: no    Assessment: Pt states follows closely with pulmonologist for COPD, was sent for CXR and results abnormal. Pt then scheduled for CT Scan Chest, before could get done was put in hospital with [facility] seizure. Was sent to [facility], had resection completed June 2020. Incision to left back of head intact, healed and well approximated. SOB with minimal exertion, improves with rest. States thick clear phlegm with cough. Pt reports since brain surgery, tongue is numb which makes speaking difficult to understand at times. Pt states neuropathy in feet cause chronic pain.  Pt reports is scheduled for CT Sim tomorrow at [facility], is here to discuss Lung ca treatment options. Past Medical History:   Diagnosis Date    COPD (chronic obstructive pulmonary disease) ([facility])     Dr. Brian Roy Emphysema     GERD (gastroesophageal reflux disease)     No meds as of 2/26/20    HIV disease ([facility] [address] 2005    Follows with Dr. 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  10 + yrs ago    COLONOSCOPY  5/21/15    polyp, int hem, biopsy - Dr. Walker Lloyd  20+ yrs ago    Peridontal disease    ENDOSCOPY, COLON, DIAGNOSTIC  5/21/15    egd bx, mild esophagitis, esoph ring, dil, hiatal hernia - Dr. Johnny Troncoso    10 + yrs ago and 45 yrs ago, right ing hernia repair    TONSILLECTOMY  1960's    WRIST FRACTURE SURGERY Left 2/28/2020    WRIST OPEN REDUCTION INTERNAL FIXATION LEFT performed by Sergio Dunne MD at [place] [address]       No Known Allergies       Current Outpatient Medications:     guaiFENesin (MUCINEX) 600 MG extended release tablet, Take 1,200 mg by mouth 2 times daily, Disp: , Rfl:     levETIRAcetam (KEPPRA) 500 MG tablet, TAKE 1 TABLET BY MOUTH EVERY 12 HOURS, Disp: , Rfl:     albuterol-ipratropium (COMBIVENT RESPIMAT)  MCG/ACT AERS inhaler, Inhale 1 puff into the lungs every 6 hours as needed for Wheezing, Disp: 4 g, Rfl: 3    budesonide (PULMICORT FLEXHALER) 180 MCG/ACT AEPB inhaler, Inhale 2 puffs into the lungs 2 times daily. , Disp: 1 each, Rfl: 3    theophylline (UNIPHYL) 400 MG extended release tablet, Take one half tablet by mouth twice daily, Disp: 90 tablet, Rfl: 3    Abacavir-Dolutegravir-Lamivud (TRIUMEQ) 600- MG TABS, Take 1 tablet by mouth daily, Disp: 30 tablet, Rfl: 5    Respiratory Therapy Supplies

## 2020-07-07 ASSESSMENT — ENCOUNTER SYMPTOMS
COUGH: 1
GASTROINTESTINAL NEGATIVE: 1
SHORTNESS OF BREATH: 1
EYES NEGATIVE: 1

## 2020-07-08 ENCOUNTER — OFFICE VISIT (OUTPATIENT)
Dept: INFECTIOUS DISEASES | Age: 65
End: 2020-07-08
Payer: COMMERCIAL

## 2020-07-08 VITALS
DIASTOLIC BLOOD PRESSURE: 62 MMHG | SYSTOLIC BLOOD PRESSURE: 114 MMHG | TEMPERATURE: 97.7 F | WEIGHT: 193.2 LBS | HEART RATE: 94 BPM | BODY MASS INDEX: 29.38 KG/M2

## 2020-07-08 PROCEDURE — 99213 OFFICE O/P EST LOW 20 MIN: CPT | Performed by: INTERNAL MEDICINE

## 2020-07-08 RX ORDER — ABACAVIR SULFATE, DOLUTEGRAVIR SODIUM, LAMIVUDINE 600; 50; 300 MG/1; MG/1; MG/1
1 TABLET, FILM COATED ORAL DAILY
Qty: 30 TABLET | Refills: 5 | Status: SHIPPED | OUTPATIENT
Start: 2020-07-08 | End: 2020-09-02

## 2020-07-08 NOTE — PROGRESS NOTES
7/8/2020     Diagnosis Orders   1. Asymptomatic HIV infection ([facility] [address] [facility]  HIV-1 RNA, quantitative, PCR    Hepatic Function Panel    Basic Metabolic Panel    CBC Auto Differential    Flow Cytometry [place] CD4/CD8 Blood    Abacavir-Dolutegravir-Lamivud (TRIUMEQ) 600- MG TABS       DISCUSSION   HIV: Pt is stable/tolerating ART well; currently on Triumeq since 2/2018  · Neuropathic pain in both legs, unable to tolerate gabapentin   No components found for: CD4H No components found for: HIVRN    CD 4 494, HIV VL< 20 (6/22/2029)   Hepatitis C Ab negative (12/28/17);   Hep B immune, Hep A non-immune-  Remains immune to hep B   Chronic medical problems    Lipid recommendation: being managed by PCP   Labs to be done every 3-6 months: CBC, CD4, VL, BMP, LFTs   Labs to be done once every 12 months (last 6/2020: HCV, Lipids, T-SPOT (negative6/22/2020), RPR, UA   Vaccines: PCV13/PSSV23, Tdap, yearly flu recommended f/up with PCP and Pulmonologist   Colorectal cancer screen: 2016 benign polyps; repeat as directed by GI   Prostate cancer screen done about 3 years ago, no issues at the time    Dexa scan: 8/10/16 showed osteopenia and repeat DEXA scan 1/7/19 ; non-adherent to Calcium/Vit D, owing to pill burden. Future Appointments   Date Time Provider Rowdy Bellamy   7/15/2020  8:45 AM 1200 Levine, Susan. \Hospital [facility] a New [facility] and Outlook.\"" MED ONC NURSE [facility] MED ONC [place]   7/15/2020  9:00 AM Luz Hill MD [address] [place] [address] MED ONC [facility]   [facility]2020  2[facility]   10/7/2020  9:00 AM Lyndsey Vaughan MD INFECT DIS  [facility]       Felicita Hickman is a 59 y.o.  male     Chief Complaint: follow up for HIV     HISTORY OF PRESENT ILLNESS:  MSM diagnosed in 2005. Contracted through sex. CD4 vesta : 195. Treated with Truvada and Sustiva, but transitioned to Atripla. Started Triumeq in 2/2018, has tolerated it without any issues. Has not had sex since 2010. Denies recreational drug use.    Tolerating Triumeq  · Continues to have neuropathic pain. 7/8: recently diagnosed with NSCLC with brain metastasis, underwent excision of brain mass at [facility]. Chemotherapy and radiation is planned. Planned chemotherapy daily for 14 weeks. PET scan will help guide therapy    ART Adherance/Tolerability is excellent    Review of Systems   Constitutional: Negative. HENT: Negative. Eyes: Negative. Respiratory: Positive for cough and shortness of breath (due to COPD, improved with inhalers). Cardiovascular: Negative. Gastrointestinal: Negative. Genitourinary: Negative. Musculoskeletal: Negative. Skin: Negative.         Patient Active Problem List    Diagnosis Date Noted    Secondary malignant neoplasm of brain ([facility] 07/06/2020    Primary malignant neoplasm of bronchus of right middle lobe ([facility] 07/06/2020    S/P ORIF (open reduction internal fixation) fracture 04/14/2020    Closed fracture of left distal radius     Osteopenia of multiple sites 01/03/2019    Recurrent major depressive disorder, in partial remission ([facility] 10/13/2017    Pharyngoesophageal dysphagia     Gastroesophageal reflux disease without esophagitis     Exertional dyspnea 08/28/2016    Dizziness 08/28/2016    HIV (human immunodeficiency virus infection) ([facility] 08/04/2016     Overview:   · HIV Diagnosed (When/Where):  [facility]  · HIV Complications: None  · CD4 Arturo:  195  · Current ART (Started when): Atripla (2005)  · Previous ART: NA  · Known Resistance:  None      Vitamin D deficiency 01/28/2016    Insomnia 01/28/2016    Former smoker 01/28/2016    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  10 + yrs ago    COLONOSCOPY  5/21/15    polyp, int hem, biopsy - Dr. Evan Barnes  20+ yrs ago    Peridontal disease    ENDOSCOPY, COLON, DIAGNOSTIC  5/21/15    egd bx, mild esophagitis, esoph ring, dil, hiatal hernia - Dr. Emmanuel Bruce    10 + yrs ago and 45 yrs ago, right ing hernia repair    TONSILLECTOMY      WRIST FRACTURE SURGERY Left 2020    WRIST OPEN REDUCTION INTERNAL FIXATION LEFT performed by Lia Jaimes MD at [address] [place] [address] Marital status: Single     Spouse name: Not on file    Number of children: Not on file    Years of education: Not on file    Highest education level: Not on file   Occupational History    Occupation: retired/   Social Needs    Financial resource strain: Not on file    Food insecurity     Worry: Not on file     Inability: Not on file   [employer] needs     Medical: Not on file     Non-medical: Not on file   Tobacco Use    Smoking status: Former Smoker     Packs/day: 1.00     Years: 32.00     Pack years: 32.00     Last attempt to quit: 2004     Years since quittin.2    Smokeless tobacco: Never Used   Substance and Sexual Activity    Alcohol use: No    Drug use: No    Sexual activity: Not Currently     Partners: Female, Male   Lifestyle    Physical activity     Days per week: Not on file     Minutes per session: Not on file    Stress: Not on file   Relationships    Social connections     Talks on phone: Not on file     Gets together: Not on file     Attends Spiritism service: Not on file     Active member of club or organization: Not on file     Attends meetings of clubs or organizations: Not on file     Relationship status: Not on file    Intimate partner violence     Fear of current or ex partner: Not on file     Emotionally abused: Not on file     Physically abused: Not on file     Forced sexual activity: Not on file Other Topics Concern    Not on file   Social History Narrative    Not on file       Family History   Problem Relation Age of Onset    Cancer Mother         breast ca    Heart Disease Father     High Cholesterol Sister     Cancer Maternal Uncle         Melanoma    Cancer Maternal Grandmother         Lung (Smoker)    Heart Disease Maternal Grandmother     Cancer Maternal Grandfather         Leukemia    Heart Disease Maternal Grandfather     Heart Disease Paternal Grandmother     Heart Disease Paternal Grandfather        Most recent labs reviewed with patient    CD4 / T Cells:   No components found for: CD4H  HIV Viral Load:  No components found for: HIVRN   WBC:  Lab Results   Component Value Date    WBC 12.4 06/13/2020    WBC 8.6 02/28/2020    WBC 9.8 05/09/2017      Creatinine:  Lab Results   Component Value Date    CREATININE 1.3 06/13/2020    CREATININE 1.1 02/28/2020    CREATININE 1.2 05/09/2017     ALT:  Lab Results   Component Value Date    ALT 9 06/13/2020    ALT 9 05/09/2017    ALT 11 08/29/2016     LDL CHOL:  No components found for: LDL    Current problems and complaints as above. Otherwise unremarkable including HEENT, Constitutional, Cardiovascular, Respiratory, GI, Musculoskeletal, Skin, Endocrine, Hemo/Lympatic, Neurologic    Vital Signs:  Vitals:    07/08/20 0859   BP: 114/62   Site: Left Upper Arm   Position: Sitting   Cuff Size: Medium Adult   Pulse: 94   Temp: 97.7 °F (36.5 °C)   TempSrc: Infrared   Weight: 193 lb 3.2 oz (87.6 kg)     Wt Readings from Last 3 Encounters:   07/08/20 193 lb 3.2 oz (87.6 kg)   07/06/20 191 lb 11 oz (87 kg)   06/29/20 191 lb (86.6 kg)      Estimated body mass index is 29.38 kg/m² as calculated from the following:    Height as of 7/6/20: 5' 8\" (1.727 m). Weight as of this encounter: 193 lb 3.2 oz (87.6 kg).      Physical Exam:   Gen: alert, NAD  HEENT: AT/NC, no icterus, no oral lesions, extracted teeth, no gingivial disease  Neck: supple, no JVD, no cervical lymphadenopathy, trachea midline, no thyromegaly or thyroid nodules. Chest: reduced air entry bilaterally  Heart: regular rate and rhythm, no murmurs, gallop, or rub. ABD: soft, non-distended, no HSM/masses, non-tender, normoactive bowel sounds  EXT: no cyanosis, no clubbing, no splinter hemorrhages, no edema  NEURO: CN 2-12 intact, no focal deficits, normal gait  Derm: no rashes. Psych: normal affect.      Imaging Studies:   Reviewed - recent studies reviewed

## 2020-07-09 ENCOUNTER — HOSPITAL ENCOUNTER (OUTPATIENT)
Dept: PET IMAGING | Age: 65
Discharge: HOME | End: 2020-07-09
Payer: COMMERCIAL

## 2020-07-09 PROCEDURE — A9552 F18 FDG: HCPCS | Performed by: THORACIC SURGERY (CARDIOTHORACIC VASCULAR SURGERY)

## 2020-07-09 PROCEDURE — 3430000000 HC RX DIAGNOSTIC RADIOPHARMACEUTICAL: Performed by: THORACIC SURGERY (CARDIOTHORACIC VASCULAR SURGERY)

## 2020-07-09 PROCEDURE — 78815 PET IMAGE W/CT SKULL-THIGH: CPT

## 2020-07-09 PROCEDURE — 2580000003 HC RX 258: Performed by: THORACIC SURGERY (CARDIOTHORACIC VASCULAR SURGERY)

## 2020-07-09 RX ORDER — FLUDEOXYGLUCOSE F 18 200 MCI/ML
13.45 INJECTION, SOLUTION INTRAVENOUS
Status: COMPLETED | OUTPATIENT
Start: 2020-07-09 | End: 2020-07-09

## 2020-07-09 RX ORDER — SODIUM CHLORIDE 0.9 % (FLUSH) 0.9 %
10 SYRINGE (ML) INJECTION PRN
Status: COMPLETED | OUTPATIENT
Start: 2020-07-09 | End: 2020-07-09

## 2020-07-09 RX ADMIN — FLUDEOXYGLUCOSE F 18 13.45 MILLICURIE: 200 INJECTION, SOLUTION INTRAVENOUS at 11:33

## 2020-07-09 RX ADMIN — SODIUM CHLORIDE, PRESERVATIVE FREE 10 ML: 5 INJECTION INTRAVENOUS at 11:33

## 2020-07-15 ENCOUNTER — HOSPITAL ENCOUNTER (OUTPATIENT)
Dept: INFUSION THERAPY | Age: 65
Discharge: HOME | End: 2020-07-15
Payer: COMMERCIAL

## 2020-07-15 PROCEDURE — 99211 OFF/OP EST MAY X REQ PHY/QHP: CPT

## 2020-07-24 RX ORDER — SODIUM CHLORIDE 9 MG/ML
20 INJECTION, SOLUTION INTRAVENOUS ONCE
Status: CANCELLED | OUTPATIENT
Start: 2020-08-19

## 2020-07-24 RX ORDER — PALONOSETRON 0.05 MG/ML
0.25 INJECTION, SOLUTION INTRAVENOUS ONCE
Status: CANCELLED | OUTPATIENT
Start: 2020-08-19

## 2020-07-24 RX ORDER — MEPERIDINE HYDROCHLORIDE 50 MG/ML
12.5 INJECTION INTRAMUSCULAR; INTRAVENOUS; SUBCUTANEOUS ONCE
Status: CANCELLED | OUTPATIENT
Start: 2020-08-19

## 2020-07-24 RX ORDER — SODIUM CHLORIDE 0.9 % (FLUSH) 0.9 %
10 SYRINGE (ML) INJECTION PRN
Status: CANCELLED | OUTPATIENT
Start: 2020-08-19

## 2020-07-24 RX ORDER — METHYLPREDNISOLONE SODIUM SUCCINATE 125 MG/2ML
125 INJECTION, POWDER, LYOPHILIZED, FOR SOLUTION INTRAMUSCULAR; INTRAVENOUS ONCE
Status: CANCELLED | OUTPATIENT
Start: 2020-08-19

## 2020-07-24 RX ORDER — DIPHENHYDRAMINE HYDROCHLORIDE 50 MG/ML
50 INJECTION INTRAMUSCULAR; INTRAVENOUS ONCE
Status: CANCELLED | OUTPATIENT
Start: 2020-08-19

## 2020-07-24 RX ORDER — SODIUM CHLORIDE 0.9 % (FLUSH) 0.9 %
5 SYRINGE (ML) INJECTION PRN
Status: CANCELLED | OUTPATIENT
Start: 2020-08-19

## 2020-07-24 RX ORDER — SODIUM CHLORIDE 9 MG/ML
INJECTION, SOLUTION INTRAVENOUS CONTINUOUS
Status: CANCELLED | OUTPATIENT
Start: 2020-08-19

## 2020-07-24 RX ORDER — EPINEPHRINE 1 MG/ML
0.3 INJECTION, SOLUTION, CONCENTRATE INTRAVENOUS PRN
Status: CANCELLED | OUTPATIENT
Start: 2020-08-19

## 2020-07-24 RX ORDER — HEPARIN SODIUM (PORCINE) LOCK FLUSH IV SOLN 100 UNIT/ML 100 UNIT/ML
500 SOLUTION INTRAVENOUS PRN
Status: CANCELLED | OUTPATIENT
Start: 2020-08-19

## 2020-07-30 ENCOUNTER — HOSPITAL ENCOUNTER (OUTPATIENT)
Dept: INFUSION THERAPY | Age: 65
Discharge: HOME | End: 2020-07-30
Payer: COMMERCIAL

## 2020-07-30 PROCEDURE — 99211 OFF/OP EST MAY X REQ PHY/QHP: CPT

## 2020-08-04 ENCOUNTER — HOSPITAL ENCOUNTER (OUTPATIENT)
Dept: RADIATION ONCOLOGY | Age: 65
Discharge: HOME | End: 2020-08-04
Attending: RADIOLOGY
Payer: COMMERCIAL

## 2020-08-04 PROCEDURE — 77470 SPECIAL RADIATION TREATMENT: CPT | Performed by: RADIOLOGY

## 2020-08-04 PROCEDURE — 77290 THER RAD SIMULAJ FIELD CPLX: CPT | Performed by: RADIOLOGY

## 2020-08-04 PROCEDURE — 77334 RADIATION TREATMENT AID(S): CPT | Performed by: RADIOLOGY

## 2020-08-04 PROCEDURE — 77332 RADIATION TREATMENT AID(S): CPT | Performed by: RADIOLOGY

## 2020-08-04 PROCEDURE — 99213 OFFICE O/P EST LOW 20 MIN: CPT | Performed by: RADIOLOGY

## 2020-08-04 PROCEDURE — 77263 THER RADIOLOGY TX PLNG CPLX: CPT | Performed by: RADIOLOGY

## 2020-08-04 NOTE — PROGRESS NOTES
hiatal hernia - Dr. Bartolome Devlin    10 + yrs ago and 39 yrs ago, right ing hernia repair    TONSILLECTOMY  's    WRIST FRACTURE SURGERY Left 2020    WRIST OPEN REDUCTION INTERNAL FIXATION LEFT performed by Stacie Kohler MD at [address] [place] History   Problem Relation Age of Onset    Cancer Mother         breast ca    Heart Disease Father     High Cholesterol Sister     Cancer Maternal Uncle         Melanoma    Cancer Maternal Grandmother         Lung (Smoker)    Heart Disease Maternal Grandmother     Cancer Maternal Grandfather         Leukemia    Heart Disease Maternal Grandfather     Heart Disease Paternal Grandmother     Heart Disease Paternal Grandfather        Social History     Socioeconomic History    Marital status: Single     Spouse name: Not on file    Number of children: Not on file    Years of education: Not on file    Highest education level: Not on file   Occupational History    Occupation: retired/   Social Needs    Financial resource strain: Not on file    Food insecurity     Worry: Not on file     Inability: Not on file    Transportation needs     Medical: Not on file     Non-medical: Not on file   Tobacco Use    Smoking status: Former Smoker     Packs/day: 1.00     Years: 32.00     Pack years: 32.00     Last attempt to quit: 2004     Years since quittin.2    Smokeless tobacco: Never Used   Substance and Sexual Activity    Alcohol use: No    Drug use: No    Sexual activity: Not Currently     Partners: Female, Male   Lifestyle    Physical activity     Days per week: Not on file     Minutes per session: Not on file    Stress: Not on file   Relationships    Social connections     Talks on phone: Not on file     Gets together: Not on file     Attends Confucianism service: Not on file     Active member of club or organization: Not on file     Attends meetings of clubs or organizations: Not on file     Relationship status: Not Component Value Date     06/22/2020    K 4.5 06/22/2020    CL 99 06/22/2020    CO2 28 06/22/2020    BUN 21 06/22/2020    CREATININE 1.2 06/22/2020    GFRAA >60 06/13/2020    AGRATIO 0 06/22/2020    LABGLOM 64 06/22/2020    LABGLOM 56 06/13/2020    GLUCOSE 77 06/22/2020    PROT 0 06/22/2020    LABALBU 3.8 06/22/2020    CALCIUM 8.9 06/22/2020    BILITOT 0.3 06/22/2020    ALKPHOS 101 06/22/2020    AST 25 06/22/2020    ALT 19 06/22/2020     Onc labs:   Lab Results   Component Value Date    PSA 2.03 03/13/2013    CEA 3.4 08/30/2016         RADIOLOGIC STUDIES:     PET CT Skull Base To Mid Thigh  Narrative: EXAMINATION:  WHOLE BODY PET/CT    7/9/2020    TECHNIQUE:  Following IV injection of 13.5 mCi of F 18 -FDG, PET  tumor imaging was  acquired from the skull vertex to the mid thighs. Computed tomography was  used for purposes of attenuation correction and anatomic localization. Fusion imaging was utilized for interpretation. Uptake time 90 mins. Glucose level 94 mg/dl. COMPARISON:  CT scan of the chest 06/13/2020    HISTORY:  ORDERING SYSTEM PROVIDED HISTORY: Primary malignant neoplasm of bronchus of  right middle lobe [facility])  TECHNOLOGIST PROVIDED HISTORY:  initial staging lung ca. FINDINGS:  HEAD/NECK: No metabolically active cervical lymphadenopathy. CHEST: Heterogeneous activity associated with the right middle lobe masslike  consolidation with greatest activity towards the hilum, SUV max of 22.4. Separate focal FDG activity in the right hilum with SUV max of 6.1. Metabolically active subcarinal lymph node measuring 2 cm in short axis  dimension, SUV max of 17.1. Increased FDG activity corresponds to irregular patchy opacity in the  peripheral left upper lobe, similar to the recent CT scan, SUV max of 2.2. ABDOMEN/PELVIS: No metabolically active intraperitoneal mass. No  metabolically active abdominal or pelvic lymphadenopathy.   Physiologic  activity in the gastrointestinal and genitourinary systems. BONES/SOFT TISSUE: Focal activity in the left sacrum where there is subtle  sclerotic change, SUV max of 15.7. INCIDENTAL CT FINDINGS: Severe emphysema. Evidence of prior granulomatous  disease. Left renal cyst.  Surgical clips in left lower quadrant, likely  from prior inguinal hernia repair. Impression: 1. Intense metabolic activity associated with the masslike opacification of  the right middle lobe which may represent primary lung cancer and/or  postobstructive pneumonia. 2.  Metabolically active right hilar and subcarinal lymph node metastasis. 3.  Solitary metabolically active skeletal metastasis in the left aspect of  the sacrum. MEDICAL DECISION MAKING:   We discussed diagnosis and treatments  Reviewed PET/CT with patient. He is concerned about the MUKUND lesion and hypermetabolic lesion in left sacrum. Explained that plan is to deliver RT to the chest which may or may not include the MUKUND lesion as it is small and asymptomatic. Also that left sacrum would be untreated with RT at this point unless he became symptomatic. Plan for CT simulation today to begin RT planning for chest.      TIME SPENT WITH PATIENT: 20 minutes spent in this encounter, >50% involved face-to-face counseling and coordination of care.     Electronically signed by Electronically signed by Jackie Calle MD on 8/4/2020 at 9:34 AM

## 2020-08-12 ENCOUNTER — CLINICAL DOCUMENTATION (OUTPATIENT)
Dept: ONCOLOGY | Age: 65
End: 2020-08-12

## 2020-08-12 NOTE — PROGRESS NOTES
Patient called stating that he f/u w/surgeon after his surgery but has developed severe swelling in his ankles and feet, surgeon ordered an US to rule out DVT which was negative and advised patient call Dr Asif Aponte, after reviewing w/Dr Asif Aponte, he advised that the patient should call his PCP, patient is aware and states understanding

## 2020-08-14 PROCEDURE — 99999 PR OFFICE/OUTPT VISIT,PROCEDURE ONLY: CPT | Performed by: RADIOLOGY

## 2020-08-14 PROCEDURE — 77293 RESPIRATOR MOTION MGMT SIMUL: CPT | Performed by: RADIOLOGY

## 2020-08-14 PROCEDURE — 77301 RADIOTHERAPY DOSE PLAN IMRT: CPT | Performed by: RADIOLOGY

## 2020-08-14 PROCEDURE — 77338 DESIGN MLC DEVICE FOR IMRT: CPT | Performed by: RADIOLOGY

## 2020-08-14 PROCEDURE — 77300 RADIATION THERAPY DOSE PLAN: CPT | Performed by: RADIOLOGY

## 2020-08-18 ENCOUNTER — HOSPITAL ENCOUNTER (OUTPATIENT)
Dept: INFUSION THERAPY | Age: 65
Discharge: HOME | End: 2020-08-18
Payer: COMMERCIAL

## 2020-08-18 ENCOUNTER — HOSPITAL ENCOUNTER (OUTPATIENT)
Dept: RADIATION ONCOLOGY | Age: 65
Discharge: HOME | End: 2020-08-18
Attending: RADIOLOGY
Payer: COMMERCIAL

## 2020-08-18 ENCOUNTER — OFFICE VISIT (OUTPATIENT)
Dept: ONCOLOGY | Age: 65
End: 2020-08-18
Payer: COMMERCIAL

## 2020-08-18 VITALS
HEART RATE: 107 BPM | BODY MASS INDEX: 27.4 KG/M2 | HEIGHT: 69 IN | DIASTOLIC BLOOD PRESSURE: 61 MMHG | OXYGEN SATURATION: 96 % | TEMPERATURE: 97.2 F | WEIGHT: 185 LBS | RESPIRATION RATE: 20 BRPM | SYSTOLIC BLOOD PRESSURE: 109 MMHG

## 2020-08-18 PROBLEM — G63 POLYNEUROPATHY IN DISEASES CLASSIFIED ELSEWHERE (HCC): Status: ACTIVE | Noted: 2018-02-01

## 2020-08-18 PROBLEM — R93.89 ABNORMAL CXR: Status: ACTIVE | Noted: 2020-06-13

## 2020-08-18 PROBLEM — R56.9 SEIZURE (HCC): Status: ACTIVE | Noted: 2020-06-13

## 2020-08-18 PROBLEM — C34.91 ADENOCARCINOMA OF LUNG, STAGE 4, RIGHT (HCC): Status: ACTIVE | Noted: 2020-07-06

## 2020-08-18 LAB
ALBUMIN SERPL-MCNC: 3.6 GM/DL (ref 3.4–5)
ALP BLD-CCNC: 154 IU/L (ref 40–128)
ALT SERPL-CCNC: <5 U/L (ref 10–40)
ANION GAP SERPL CALCULATED.3IONS-SCNC: 12 MMOL/L (ref 4–16)
AST SERPL-CCNC: 13 IU/L (ref 15–37)
BASOPHILS ABSOLUTE: 0 K/CU MM
BASOPHILS RELATIVE PERCENT: 0.2 % (ref 0–1)
BILIRUB SERPL-MCNC: 0.3 MG/DL (ref 0–1)
BUN BLDV-MCNC: 8 MG/DL (ref 6–23)
CALCIUM SERPL-MCNC: 9.2 MG/DL (ref 8.3–10.6)
CHLORIDE BLD-SCNC: 98 MMOL/L (ref 99–110)
CO2: 27 MMOL/L (ref 21–32)
CREAT SERPL-MCNC: 1.1 MG/DL (ref 0.9–1.3)
DIFFERENTIAL TYPE: ABNORMAL
EOSINOPHILS ABSOLUTE: 0.1 K/CU MM
EOSINOPHILS RELATIVE PERCENT: 0.9 % (ref 0–3)
GFR AFRICAN AMERICAN: >60 ML/MIN/1.73M2
GFR NON-AFRICAN AMERICAN: >60 ML/MIN/1.73M2
GLUCOSE BLD-MCNC: 108 MG/DL (ref 70–99)
HCT VFR BLD CALC: 40.1 % (ref 42–52)
HEMOGLOBIN: 13.2 GM/DL (ref 13.5–18)
LYMPHOCYTES ABSOLUTE: 1.2 K/CU MM
LYMPHOCYTES RELATIVE PERCENT: 13.3 % (ref 24–44)
MCH RBC QN AUTO: 28.4 PG (ref 27–31)
MCHC RBC AUTO-ENTMCNC: 32.9 % (ref 32–36)
MCV RBC AUTO: 86.2 FL (ref 78–100)
MONOCYTES ABSOLUTE: 1.1 K/CU MM
MONOCYTES RELATIVE PERCENT: 13 % (ref 0–4)
PDW BLD-RTO: 13.6 % (ref 11.7–14.9)
PLATELET # BLD: 408 K/CU MM (ref 140–440)
PMV BLD AUTO: 8.6 FL (ref 7.5–11.1)
POTASSIUM SERPL-SCNC: 3.8 MMOL/L (ref 3.5–5.1)
RBC # BLD: 4.65 M/CU MM (ref 4.6–6.2)
SEGMENTED NEUTROPHILS ABSOLUTE COUNT: 6.3 K/CU MM
SEGMENTED NEUTROPHILS RELATIVE PERCENT: 72.6 % (ref 36–66)
SODIUM BLD-SCNC: 137 MMOL/L (ref 135–145)
TOTAL PROTEIN: 6.7 GM/DL (ref 6.4–8.2)
WBC # BLD: 8.7 K/CU MM (ref 4–10.5)

## 2020-08-18 PROCEDURE — 36415 COLL VENOUS BLD VENIPUNCTURE: CPT

## 2020-08-18 PROCEDURE — 99211 OFF/OP EST MAY X REQ PHY/QHP: CPT

## 2020-08-18 PROCEDURE — 77386 HC NTSTY MODUL RAD TX DLVR CPLX: CPT | Performed by: RADIOLOGY

## 2020-08-18 PROCEDURE — 99214 OFFICE O/P EST MOD 30 MIN: CPT | Performed by: NURSE PRACTITIONER

## 2020-08-18 PROCEDURE — 80053 COMPREHEN METABOLIC PANEL: CPT

## 2020-08-18 PROCEDURE — 85025 COMPLETE CBC W/AUTO DIFF WBC: CPT

## 2020-08-18 PROCEDURE — 77014 PR CT GUIDANCE PLACEMENT RAD THERAPY FIELDS: CPT | Performed by: RADIOLOGY

## 2020-08-18 PROCEDURE — 77300 RADIATION THERAPY DOSE PLAN: CPT | Performed by: RADIOLOGY

## 2020-08-18 RX ORDER — ONDANSETRON HYDROCHLORIDE 8 MG/1
8 TABLET, FILM COATED ORAL EVERY 8 HOURS PRN
Qty: 30 TABLET | Refills: 1 | Status: SHIPPED | OUTPATIENT
Start: 2020-08-18 | End: 2020-09-17

## 2020-08-18 RX ORDER — DEXAMETHASONE 0.5 MG/1
TABLET ORAL
Qty: 18 TABLET | Refills: 0 | Status: SHIPPED | OUTPATIENT
Start: 2020-08-18 | End: 2020-08-18 | Stop reason: SDUPTHER

## 2020-08-18 RX ORDER — DEXAMETHASONE 2 MG/1
TABLET ORAL
Qty: 18 TABLET | Refills: 0 | Status: SHIPPED | OUTPATIENT
Start: 2020-08-18 | End: 2020-10-07

## 2020-08-18 RX ORDER — HYDROCODONE BITARTRATE AND ACETAMINOPHEN 5; 325 MG/1; MG/1
TABLET ORAL
COMMUNITY
Start: 2020-07-31 | End: 2020-09-09 | Stop reason: ALTCHOICE

## 2020-08-18 ASSESSMENT — PATIENT HEALTH QUESTIONNAIRE - PHQ9
2. FEELING DOWN, DEPRESSED OR HOPELESS: 0
SUM OF ALL RESPONSES TO PHQ QUESTIONS 1-9: 0
1. LITTLE INTEREST OR PLEASURE IN DOING THINGS: 0
SUM OF ALL RESPONSES TO PHQ QUESTIONS 1-9: 0
SUM OF ALL RESPONSES TO PHQ9 QUESTIONS 1 & 2: 0

## 2020-08-18 NOTE — PROGRESS NOTES
MA Rooming Questions  Patient: Gregory Valdivia  MRN: [id number]    Date: 8/18/2020        1. Do you have any new issues? yes - pain in both feet         2. Do you need any refills on medications?    no    3. Have you had any imaging done since your last visit?   no    4. Have you been hospitalized or seen in the emergency room since your last visit here?   yes - [facility]    5. Did the patient have a depression screening completed today?  Yes    PHQ-9 Total Score: 0 (8/18/2020  9:26 AM)       PHQ-9 Given to (if applicable):               PHQ-9 Score (if applicable):                     [] Positive     []  Negative              Does question #9 need addressed (if applicable)                     [] Yes    []  No               Estefani Valdez MA

## 2020-08-18 NOTE — PROGRESS NOTES
Pt here for treatment planning. Discussed treatment plan, potential sided effects, prevention, and management. Pt expressed understanding and signed consent. Pt given a copy of signed consent/treatment plan, chemo ed folder, and drug monograph. Labs drawn. Verified apt for treatment start.

## 2020-08-18 NOTE — PROGRESS NOTES
Patient Name: Flower Tijerina    Patient : 1955     Patient MRN: [id number]       Date: 2020                                                                                                   CHEMOTHERAPY TREATMENT PLAN    Care Team  Medical Oncologist: Michelle Santos MD  Surgeon:   Radiation Oncologist:   Primary Care Physician: Rafa Vaughan MD     Learning Needs Assessment  Before the treatment plan was discussed and the consent was signed, the care team assessed the patient's learning needs. This includes their ability to assume responsibility for managing their therapy. Diagnosis     Non small cell lung cancer    The Goal of My Therapy is    (  ) To cure my cancer  (  ) To shrink the tumor prior to surgery  (  ) Increase my chance of cure after surgery  (x) Help me live as long as possible with the highest quality of life. I know that a cure is not possible.      Prognosis    (  ) Curable  (X) Palliative    Plan Of Treatment    Intent:  (  ) Neoadjuvant   (  ) Adjuvant   (X) Control    Treatment Regimen  (including supportive meds)                             Frequency                                               Duration     Carboplatin/paclitaxel           Weekly x 7     Expected Response to Treatment    (  ) This therapy could cure your disease  (X) This therapy has a good chance of helping you live longer                                                                                       (  ) This therapy has a good chance of helping you feel better or making you live months longer compared to without it     Quality Of Life    (  ) Expect that you will be able to continue all normal activities  (  ) Expect that you will have some side effects but should be able to maintain normal activities  (X) Expect that you will be unable to work but able to perform light duties at home  (  ) Expect that you will be able to perform light duties and will need assistance with self care    Treatment Benefits and Harms     1. Patient taught on all common and rare toxicities regarding their treatment, disease process and drug regimen. This includes the short and long-         term effects of therapy (including infertility risks when appropriate). This also includes drug-drug and drug-food interactions when appropriate. Patient was educated when to call [facility] with adverse effects and symptoms. 2.    Patient was taught safe handling and storage of medications in the home (when appropriate) and procedures for handling body sections and             waste in the home. 3.    Patient was taught on planned for missed doses and follow-up plans during treatment, including marcy of provider visits and labs. 4.    Patient signed consent on treatment and drug information sheets were given. Alternative To Recommended Treatment    (  ) Palliative or Hospice  (x) Second Opinion  (  ) Other    Patient Care Management     Advance Care Planning completed: Will bring in once completed   (x) Yes   (  ) No   Pain Care Plan entered (as applicable): per PCP   (  ) Yes   (  ) No   Comments:  Distress needs addressed with the patient: edema, breathing, eating, fatigue, pain    (X) Yes   (  ) No   Distress areas needing addressed further:     Patient was educated on the expectations and their responsibility to schedule their follow-up appointments. The patient was also educated that if they refuse to show-up to their appointment or refuse to schedule a follow-up appointment that it is their responsibility to call [facility] in a timely manner to schedule their next appointment. The patient was educated on [facility] policy and that the patient is ultimately responsible for monitoring their appointments and adhering to the schedule.    () Yes   (   ) No    Estimated Out of Pocket Costs discussed per the patient per financial navigator. Patient Consented and taught per Nurse Navigator.      .  Today, time spent with the patient discussing the intent and schedule of their treatment. The patient was given a copy of their treatment summary. Questions and concerns were addressed with the patient. Time spent with the patient face to face today was 40 minutes, counseling and coordination of care dominated more than 50% of this patient encounter. 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 [facility])     Exertional dyspnea     Dizziness     HIV (human immunodeficiency virus infection) ([address]     Pharyngoesophageal dysphagia     Gastroesophageal reflux disease without esophagitis     Recurrent major depressive disorder, in partial remission ([address]     Osteopenia of multiple sites     Closed fracture of left distal radius     S/P ORIF (open reduction internal fixation) fracture     Secondary malignant neoplasm of brain ([address]     Primary malignant neoplasm of bronchus of right middle lobe ([address]     Abnormal CXR     Adenocarcinoma of lung, stage 4, right ([address]     Pulmonary emphysema ([address]     Polyneuropathy in diseases classified elsewhere [facility])     Seizure ([address]        HPI: Marixa Solano is a 57-year-old very pleasant gentleman with medical history significant for HIV/AIDS, COPD and GERD referred to me on 2020 for evaluation of his metastatic lung cancer. He initially presented to [facility] with seizure on 2020. CT scan of the head showed vasogenic edema in left parietal lobe concerning for underlying mass.  CT scan of the chest showed postobstructive collapse/consolidation of the right middle lobe, endobronchial lesion cannot be excluded and 16 mm left upper lobe nodule. He was then transferred to [facility] ED for further workup and management. MRI of the brain with and without contrast done on 6/14/20 showed 1.2 cm mass in left parietal lobe with surrounding vasogenic edema, regional mass effect and effacement of atrium of left lateral ventricle. He underwent bronchoscopy, EBUS on 6/15/20 and it showed collapse of RML lateral segment bronchus. Final pathology from EBUS guided biopsy of subcarinal LN was consistent with lung adenocarcinoma. PD-L1 was positive (95%). He then underwent resection of the left parietal lobe mass on 6/17/20 and final pathology was consistent with adenocarcinoma of lung primary. Molecular testing revealed that MET amplification was detected. MRI of the brain done on 7/2/20 showed postsurgical changes related to mass resection in the left parietal lobe. There is small rim of contrast enhancement along the resection cavity margin which may be treatment related, and attention on follow-up imaging is recommended. Surrounding vasogenic edema and mass effect have improved. New tiny focus of restricted diffusion in the left occipital lobe which could be a recent infarct (acute to subacute). No hemorrhage or mass effect associated with this. He was subsequently referred to me for further evaluation. He was seen by radiation oncologist at [facility] and he received SBRT to his brain surgical site between 7/15 and 7/17/2020. PET/CT scan done on July 9, 2020 showed Intense metabolic activity associated with the masslike opacification of the right middle lobe which may represent primary lung cancer and/or postobstructive pneumonia. Metabolically active right hilar and subcarinal lymph node metastasis. Solitary metabolically active skeletal metastasis in the left aspect of the sacrum. On July 30, 2020, he presented to me for follow-up.  On today's visit, I reviewed with him findings on PET CT scan and we looked at his PET CT scan together. I also discussed his past CT scan findings with Dr. Camila Traylor. Since he only has oligo metastasis, we decided to give concomitant chemoradiation therapy to his lung lesion, followed by consolidation RT to his left sacrum. After that, I will proceed with first line chemotherapy with pembrolizumab. After reviewing risks and benefits, he is in agreement with the plans. We will plan to start definitive concurrent chemoradiation therapy to his locoregional disease probably around mid august, 2020. He will be seen by Dr. Camila Traylor on 8/4/20. He is scheduled to have chemo port and hernia surgery repair on 7/31/20 by Dr. Zhou Starr. He doesn't have any significant symptoms at today visit. Previous Therapies    Past Medical History  Significant for  1. HIV/AIDS since 2005  2. COPD  3. GERD  4. 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 Right inguinal hernia repair   2. Tonsillectomy in 1960s  3. Left wrist fracture surgery in 2/28/2020  4. Brain metastasis removal on June 2020    Allergies  No known medication allergies    Medications  Ambien (Zolpidem Oral)   Combivent Respimat (Albuterol-Ipratropium Inhaler 100 mcg-20 mcg/actuation)   Keppra (Levetiracetam Oral (Keppra))   Triumeq (Abacavir-Dolutegravir-Lamivud Oral 600 mg-50 mg-300 mg)   Mucinex (Guaifenesin Oral ER Tab)   Pulmicort (Budesonide Nebulized 0.25 mg/2 mL)   Theophylline Oral 12 hr Tab  Social History  He is a former smoker and he quit smoking in 2005. He used to smoke 1 pack per day for about 32 years. He is currently living in [facility]. Family History  Significant for breast cancer in his mother, melanoma in his maternal uncle, lung cancer in his maternal grandmother and leukemia in his maternal grandfather.         Previous Therapies:  none     Current Therapy:  OP Non-Small Cell Lung: PACLitaxel/CARBOplatin--PACLitaxel/CARBOplatin with Concurrent Radiation Course      Interval History: Date     06/22/2020    K 4.5 06/22/2020    CL 99 06/22/2020    CO2 28 06/22/2020    BUN 21 06/22/2020    CREATININE 1.2 06/22/2020    GLUCOSE 77 06/22/2020    CALCIUM 8.9 06/22/2020    PROT 0 06/22/2020    LABALBU 3.8 06/22/2020    BILITOT 0.3 06/22/2020    ALKPHOS 101 06/22/2020    AST 25 06/22/2020    ALT 19 06/22/2020    LABGLOM 64 06/22/2020    GFRAA >60 06/13/2020    AGRATIO 0 06/22/2020    GLOB 0 06/22/2020    PHOS 3.7 08/31/2016    MG 2.5 (H) 06/13/2020     No results found for: MMA, LDH, HOMOCYSTEINE  No components found for: LD  Lab Results   Component Value Date    TSHHS 1.720 05/09/2017    T4FREE 1.47 08/29/2016       Immunology:  Lab Results   Component Value Date    PROT 0 06/22/2020     No results found for: GENEVA Ayala  No results found for: B2M    Coagulation Panel:  Lab Results   Component Value Date    PROTIME 11.0 04/23/2013    INR 1.01 04/23/2013    APTT 29.1 06/05/2012    DDIMER 297 (H) 09/20/2015       Anemia Panel:  Lab Results   Component Value Date    BWAQPMNO85 388.0 08/29/2016    FOLATE 8.8 08/29/2016       Tumor Markers:  Lab Results   Component Value Date    CEA 3.4 08/30/2016     17.7 08/30/2016    PSA 2.03 03/13/2013       Imaging:     Pathology:      Observations:  Performance Status: 1  Symptoms, but ambulatory. Restricted in physically strenuous activity, but ambulatory and able to carry out work of a light or sedentary nature   Depression Status:   Cognitive Status: Oriented to person, time, and place     Assessment & Plan:    Non small cell lung cancer-  Presented for treatment planning for Carboplatin/Paclitaxel.  We reviewed potential AE including, but not limited to: infusion reaction, myelosuppression, nausea, vomiting, alopecia, constipation and/or diarrhea, hepatotoxicity, ototoxicity, renal toxicity, peripheral neuropathy, mucositis, hypotension, nail changes, etc. He was instructed to take 8 mg dexamethasone the night prior to cycle one of treatment. She was given 2 mg dexamethsasone to take for two days starting the day after treatment. He was given zofran 8 mg every 8 hours PRN for nausea. She was given the on call number. He verbalized understanding and is willing to proceed. He was given all information in writing. He was concerned about medication interaction with his HIV medication regimen. Reviewed with pharmacist.       Lower extremity edema- reports doppler negative, has had trial of diuretic. Reports he is following with PCP for this. He has follow up 8/20/2020    Recent imaging and labs were reviewed and discussed with the patient.       Electronically signed by ANDREW Ghosh CNP on 8/18/20 at 12:29 PM EDT

## 2020-08-18 NOTE — PATIENT INSTRUCTIONS
TAXOL/CARBO MEDICATION ORDERS FOR CHEMOTHERAPY      1. Zofran 8 mg every hours as needed for nausea or vomiting. 2.  Dexamethasone (steriod) take 8 mg the night prior to the First chemotherapy only one time, then take 2 mg for 2 days after each chemotherapy.

## 2020-08-19 ENCOUNTER — HOSPITAL ENCOUNTER (OUTPATIENT)
Dept: RADIATION ONCOLOGY | Age: 65
Discharge: HOME | End: 2020-08-19
Attending: RADIOLOGY
Payer: COMMERCIAL

## 2020-08-19 ENCOUNTER — HOSPITAL ENCOUNTER (OUTPATIENT)
Dept: INFUSION THERAPY | Age: 65
Discharge: HOME | End: 2020-08-19
Payer: COMMERCIAL

## 2020-08-19 VITALS
SYSTOLIC BLOOD PRESSURE: 117 MMHG | DIASTOLIC BLOOD PRESSURE: 63 MMHG | WEIGHT: 185.2 LBS | BODY MASS INDEX: 27.43 KG/M2 | TEMPERATURE: 97.5 F | HEIGHT: 69 IN | RESPIRATION RATE: 18 BRPM | HEART RATE: 112 BPM | OXYGEN SATURATION: 96 %

## 2020-08-19 DIAGNOSIS — C34.2 PRIMARY MALIGNANT NEOPLASM OF BRONCHUS OF RIGHT MIDDLE LOBE (HCC): Primary | ICD-10-CM

## 2020-08-19 PROCEDURE — 2580000003 HC RX 258: Performed by: INTERNAL MEDICINE

## 2020-08-19 PROCEDURE — 96413 CHEMO IV INFUSION 1 HR: CPT

## 2020-08-19 PROCEDURE — 6360000002 HC RX W HCPCS: Performed by: INTERNAL MEDICINE

## 2020-08-19 PROCEDURE — 2500000003 HC RX 250 WO HCPCS

## 2020-08-19 PROCEDURE — 96375 TX/PRO/DX INJ NEW DRUG ADDON: CPT

## 2020-08-19 PROCEDURE — 6360000002 HC RX W HCPCS

## 2020-08-19 PROCEDURE — 96417 CHEMO IV INFUS EACH ADDL SEQ: CPT

## 2020-08-19 PROCEDURE — 77386 HC NTSTY MODUL RAD TX DLVR CPLX: CPT | Performed by: RADIOLOGY

## 2020-08-19 PROCEDURE — 96374 THER/PROPH/DIAG INJ IV PUSH: CPT

## 2020-08-19 PROCEDURE — 77014 PR CT GUIDANCE PLACEMENT RAD THERAPY FIELDS: CPT | Performed by: RADIOLOGY

## 2020-08-19 RX ORDER — SODIUM CHLORIDE 0.9 % (FLUSH) 0.9 %
20 SYRINGE (ML) INJECTION PRN
Status: CANCELLED | OUTPATIENT
Start: 2020-08-19

## 2020-08-19 RX ORDER — PALONOSETRON 0.05 MG/ML
INJECTION, SOLUTION INTRAVENOUS
Status: COMPLETED
Start: 2020-08-19 | End: 2020-08-19

## 2020-08-19 RX ORDER — HEPARIN SODIUM (PORCINE) LOCK FLUSH IV SOLN 100 UNIT/ML 100 UNIT/ML
500 SOLUTION INTRAVENOUS PRN
Status: CANCELLED | OUTPATIENT
Start: 2020-08-19

## 2020-08-19 RX ORDER — PALONOSETRON 0.05 MG/ML
0.25 INJECTION, SOLUTION INTRAVENOUS ONCE
Status: DISCONTINUED | OUTPATIENT
Start: 2020-08-19 | End: 2020-08-20 | Stop reason: HOSPADM

## 2020-08-19 RX ORDER — SODIUM CHLORIDE 0.9 % (FLUSH) 0.9 %
10 SYRINGE (ML) INJECTION PRN
Status: DISCONTINUED | OUTPATIENT
Start: 2020-08-19 | End: 2020-08-20 | Stop reason: HOSPADM

## 2020-08-19 RX ORDER — DIPHENHYDRAMINE HYDROCHLORIDE 50 MG/ML
INJECTION INTRAMUSCULAR; INTRAVENOUS
Status: COMPLETED
Start: 2020-08-19 | End: 2020-08-19

## 2020-08-19 RX ORDER — DIPHENHYDRAMINE HYDROCHLORIDE 50 MG/ML
50 INJECTION INTRAMUSCULAR; INTRAVENOUS ONCE
Status: DISCONTINUED | OUTPATIENT
Start: 2020-08-19 | End: 2020-08-20 | Stop reason: HOSPADM

## 2020-08-19 RX ORDER — HEPARIN SODIUM (PORCINE) LOCK FLUSH IV SOLN 100 UNIT/ML 100 UNIT/ML
500 SOLUTION INTRAVENOUS PRN
Status: DISCONTINUED | OUTPATIENT
Start: 2020-08-19 | End: 2020-08-20 | Stop reason: HOSPADM

## 2020-08-19 RX ORDER — SODIUM CHLORIDE 0.9 % (FLUSH) 0.9 %
10 SYRINGE (ML) INJECTION PRN
Status: CANCELLED | OUTPATIENT
Start: 2020-08-19

## 2020-08-19 RX ADMIN — PACLITAXEL 90 MG: 6 INJECTION, SOLUTION INTRAVENOUS at 12:05

## 2020-08-19 RX ADMIN — ALTEPLASE 2 MG: 2.2 INJECTION, POWDER, LYOPHILIZED, FOR SOLUTION INTRAVENOUS at 10:03

## 2020-08-19 RX ADMIN — PALONOSETRON 0.25 MG: 0.25 INJECTION, SOLUTION INTRAVENOUS at 11:20

## 2020-08-19 RX ADMIN — DIPHENHYDRAMINE HYDROCHLORIDE 50 MG: 50 INJECTION INTRAMUSCULAR; INTRAVENOUS at 11:20

## 2020-08-19 RX ADMIN — Medication 500 UNITS: at 13:05

## 2020-08-19 RX ADMIN — CARBOPLATIN 212 MG: 10 INJECTION, SOLUTION INTRAVENOUS at 13:15

## 2020-08-19 RX ADMIN — FAMOTIDINE 20 MG: 10 INJECTION, SOLUTION INTRAVENOUS at 11:20

## 2020-08-19 NOTE — PROGRESS NOTES
Ambulated to infusion area with assistance from cane. Here to initiated chemoradiation. Patient had labs and chemo education yesterday. Lab results verified. Patient had radiation treatment prior to arriving to infusion area. Reports taking Decadron last evening as directed. Negative blood return from Mediport. Cathflo ordered. Positive blood return after Cathflo. Chemo administered via PIV. Tolerated well.

## 2020-08-20 ENCOUNTER — HOSPITAL ENCOUNTER (OUTPATIENT)
Dept: RADIATION ONCOLOGY | Age: 65
Discharge: HOME | End: 2020-08-20
Attending: RADIOLOGY
Payer: COMMERCIAL

## 2020-08-20 PROCEDURE — 77386 HC NTSTY MODUL RAD TX DLVR CPLX: CPT | Performed by: RADIOLOGY

## 2020-08-20 PROCEDURE — 77014 PR CT GUIDANCE PLACEMENT RAD THERAPY FIELDS: CPT | Performed by: RADIOLOGY

## 2020-08-21 ENCOUNTER — HOSPITAL ENCOUNTER (OUTPATIENT)
Dept: RADIATION ONCOLOGY | Age: 65
Discharge: HOME | End: 2020-08-21
Attending: RADIOLOGY
Payer: COMMERCIAL

## 2020-08-21 PROCEDURE — 77014 PR CT GUIDANCE PLACEMENT RAD THERAPY FIELDS: CPT | Performed by: RADIOLOGY

## 2020-08-21 PROCEDURE — 77386 HC NTSTY MODUL RAD TX DLVR CPLX: CPT | Performed by: RADIOLOGY

## 2020-08-24 ENCOUNTER — HOSPITAL ENCOUNTER (OUTPATIENT)
Dept: RADIATION ONCOLOGY | Age: 65
Discharge: HOME | End: 2020-08-24
Attending: RADIOLOGY
Payer: COMMERCIAL

## 2020-08-24 VITALS — BODY MASS INDEX: 26.35 KG/M2 | WEIGHT: 178.4 LBS

## 2020-08-24 PROCEDURE — 77427 RADIATION TX MANAGEMENT X5: CPT | Performed by: RADIOLOGY

## 2020-08-24 PROCEDURE — 77386 HC NTSTY MODUL RAD TX DLVR CPLX: CPT | Performed by: RADIOLOGY

## 2020-08-24 PROCEDURE — 77014 PR CT GUIDANCE PLACEMENT RAD THERAPY FIELDS: CPT | Performed by: RADIOLOGY

## 2020-08-24 PROCEDURE — 77336 RADIATION PHYSICS CONSULT: CPT | Performed by: RADIOLOGY

## 2020-08-24 ASSESSMENT — PAIN DESCRIPTION - ONSET: ONSET: ON-GOING

## 2020-08-24 ASSESSMENT — PAIN DESCRIPTION - LOCATION: LOCATION: FOOT

## 2020-08-24 ASSESSMENT — PAIN DESCRIPTION - ORIENTATION: ORIENTATION: RIGHT;LEFT

## 2020-08-24 ASSESSMENT — PAIN DESCRIPTION - PAIN TYPE: TYPE: ACUTE PAIN

## 2020-08-24 ASSESSMENT — PAIN DESCRIPTION - FREQUENCY: FREQUENCY: CONTINUOUS

## 2020-08-24 ASSESSMENT — PAIN DESCRIPTION - DESCRIPTORS: DESCRIPTORS: BURNING

## 2020-08-24 ASSESSMENT — PAIN DESCRIPTION - PROGRESSION: CLINICAL_PROGRESSION: NOT CHANGED

## 2020-08-24 ASSESSMENT — PAIN - FUNCTIONAL ASSESSMENT: PAIN_FUNCTIONAL_ASSESSMENT: PREVENTS OR INTERFERES SOME ACTIVE ACTIVITIES AND ADLS

## 2020-08-24 ASSESSMENT — PAIN SCALES - GENERAL: PAINLEVEL_OUTOF10: 10

## 2020-08-24 NOTE — PROGRESS NOTES
Weekly Radiation Treatment Progress Note    DATE OF SERVICE: 8/24/2020     DIAGNOSIS:  Cancer Staging  Primary malignant neoplasm of bronchus of right middle lobe [facility])  Staging form: Lung, AJCC 8th Edition  - Clinical: Stage IV (pM1) - Signed by Abel Gillespie MD on 7/6/2020       TREATMENT COURSE:   Oncology History   Secondary malignant neoplasm of [address] [address]   6/13/2020 Initial Diagnosis    Secondary malignant neoplasm of brain [address] [address]     6/13/2020 Surgery    Resection left parietal metastasis           Site: right lung  Current Radiation Dose: 1000 cGy    Subjective:    No RT complaints    EXAM  There were no vitals filed for this visit.   NAD    Setup images, chart, plan reviewed    A/P:   Tolerating tx well  Continue RT as planned        Electronically signed by Todd Barrera MD on 8/24/2020 at 9:26 AM

## 2020-08-25 ENCOUNTER — HOSPITAL ENCOUNTER (OUTPATIENT)
Dept: RADIATION ONCOLOGY | Age: 65
Discharge: HOME | End: 2020-08-25
Attending: RADIOLOGY
Payer: COMMERCIAL

## 2020-08-25 ENCOUNTER — HOSPITAL ENCOUNTER (OUTPATIENT)
Dept: INFUSION THERAPY | Age: 65
Discharge: HOME | End: 2020-08-25
Payer: COMMERCIAL

## 2020-08-25 ENCOUNTER — OFFICE VISIT (OUTPATIENT)
Dept: ONCOLOGY | Age: 65
End: 2020-08-25
Payer: COMMERCIAL

## 2020-08-25 VITALS
HEIGHT: 69 IN | HEART RATE: 120 BPM | WEIGHT: 177.2 LBS | SYSTOLIC BLOOD PRESSURE: 105 MMHG | OXYGEN SATURATION: 95 % | TEMPERATURE: 98.6 F | DIASTOLIC BLOOD PRESSURE: 67 MMHG | BODY MASS INDEX: 26.25 KG/M2 | RESPIRATION RATE: 16 BRPM

## 2020-08-25 PROCEDURE — 99214 OFFICE O/P EST MOD 30 MIN: CPT | Performed by: INTERNAL MEDICINE

## 2020-08-25 PROCEDURE — 99211 OFF/OP EST MAY X REQ PHY/QHP: CPT

## 2020-08-25 PROCEDURE — 77386 HC NTSTY MODUL RAD TX DLVR CPLX: CPT | Performed by: RADIOLOGY

## 2020-08-25 PROCEDURE — 77014 PR CT GUIDANCE PLACEMENT RAD THERAPY FIELDS: CPT | Performed by: RADIOLOGY

## 2020-08-25 RX ORDER — HYDROCHLOROTHIAZIDE 25 MG/1
25 TABLET ORAL DAILY
COMMUNITY
End: 2020-10-21

## 2020-08-25 NOTE — PROGRESS NOTES
MA Rooming Questions  Patient: Raj Vancleve  MRN: [id number]    Date: 8/25/2020        1. Do you have any new issues?   no         2. Do you need any refills on medications?    no    3. Have you had any imaging done since your last visit?   no    4. Have you been hospitalized or seen in the emergency room since your last visit here?   no    5.  Did the patient have a depression screening completed today? no    No data recorded     PHQ-9 Given to (if applicable):               PHQ-9 Score (if applicable):                     [] Positive     []  Negative              Does question #9 need addressed (if applicable)                     [] Yes    []  No               Renee García MA

## 2020-08-26 ENCOUNTER — HOSPITAL ENCOUNTER (OUTPATIENT)
Dept: RADIATION ONCOLOGY | Age: 65
Discharge: HOME | End: 2020-08-26
Attending: RADIOLOGY
Payer: COMMERCIAL

## 2020-08-26 ENCOUNTER — HOSPITAL ENCOUNTER (OUTPATIENT)
Dept: INFUSION THERAPY | Age: 65
Discharge: HOME | End: 2020-08-26
Payer: COMMERCIAL

## 2020-08-26 VITALS
TEMPERATURE: 97.3 F | OXYGEN SATURATION: 92 % | DIASTOLIC BLOOD PRESSURE: 69 MMHG | HEIGHT: 69 IN | SYSTOLIC BLOOD PRESSURE: 118 MMHG | HEART RATE: 121 BPM | WEIGHT: 175.4 LBS | RESPIRATION RATE: 16 BRPM | BODY MASS INDEX: 25.98 KG/M2

## 2020-08-26 DIAGNOSIS — C34.2 PRIMARY MALIGNANT NEOPLASM OF BRONCHUS OF RIGHT MIDDLE LOBE (HCC): Primary | ICD-10-CM

## 2020-08-26 LAB
ALBUMIN SERPL-MCNC: 3.5 GM/DL (ref 3.4–5)
ALP BLD-CCNC: 173 IU/L (ref 40–129)
ALT SERPL-CCNC: 6 U/L (ref 10–40)
ANION GAP SERPL CALCULATED.3IONS-SCNC: 14 MMOL/L (ref 4–16)
AST SERPL-CCNC: 15 IU/L (ref 15–37)
BASOPHILS ABSOLUTE: 0 K/CU MM
BASOPHILS RELATIVE PERCENT: 0.1 % (ref 0–1)
BILIRUB SERPL-MCNC: 0.6 MG/DL (ref 0–1)
BUN BLDV-MCNC: 19 MG/DL (ref 6–23)
CALCIUM SERPL-MCNC: 8.8 MG/DL (ref 8.3–10.6)
CHLORIDE BLD-SCNC: 93 MMOL/L (ref 99–110)
CO2: 27 MMOL/L (ref 21–32)
CREAT SERPL-MCNC: 1.2 MG/DL (ref 0.9–1.3)
DIFFERENTIAL TYPE: ABNORMAL
EOSINOPHILS ABSOLUTE: 0.1 K/CU MM
EOSINOPHILS RELATIVE PERCENT: 1 % (ref 0–3)
GFR AFRICAN AMERICAN: >60 ML/MIN/1.73M2
GFR AFRICAN AMERICAN: >60 ML/MIN/1.73M2
GFR NON-AFRICAN AMERICAN: 54 ML/MIN/1.73M2
GFR NON-AFRICAN AMERICAN: >60 ML/MIN/1.73M2
GLUCOSE BLD-MCNC: 161 MG/DL (ref 70–99)
GLUCOSE BLD-MCNC: 161 MG/DL (ref 70–99)
HCT VFR BLD CALC: 40.7 % (ref 42–52)
HEMOGLOBIN: 13.5 GM/DL (ref 13.5–18)
LYMPHOCYTES ABSOLUTE: 0.4 K/CU MM
LYMPHOCYTES RELATIVE PERCENT: 5.2 % (ref 24–44)
MCH RBC QN AUTO: 28.3 PG (ref 27–31)
MCHC RBC AUTO-ENTMCNC: 33.2 % (ref 32–36)
MCV RBC AUTO: 85.3 FL (ref 78–100)
MONOCYTES ABSOLUTE: 1.1 K/CU MM
MONOCYTES RELATIVE PERCENT: 13 % (ref 0–4)
PDW BLD-RTO: 13.9 % (ref 11.7–14.9)
PLATELET # BLD: 274 K/CU MM (ref 140–440)
PMV BLD AUTO: 9.2 FL (ref 7.5–11.1)
POC CALCIUM: 1.06 MMOL/L (ref 1.12–1.32)
POC CHLORIDE: 96 MMOL/L (ref 98–109)
POC CREATININE: 1.3 MG/DL (ref 0.9–1.3)
POTASSIUM SERPL-SCNC: 3.2 MMOL/L (ref 3.6–5.1)
POTASSIUM SERPL-SCNC: 3.6 MMOL/L (ref 3.5–5.1)
RBC # BLD: 4.77 M/CU MM (ref 4.6–6.2)
SEGMENTED NEUTROPHILS ABSOLUTE COUNT: 6.8 K/CU MM
SEGMENTED NEUTROPHILS RELATIVE PERCENT: 80.7 % (ref 36–66)
SODIUM BLD-SCNC: 134 MMOL/L (ref 135–145)
SODIUM BLD-SCNC: 135 MMOL/L (ref 136–145)
TOTAL PROTEIN: 6.1 GM/DL (ref 6.4–8.2)
WBC # BLD: 8.4 K/CU MM (ref 4–10.5)

## 2020-08-26 PROCEDURE — 2500000003 HC RX 250 WO HCPCS: Performed by: INTERNAL MEDICINE

## 2020-08-26 PROCEDURE — 77386 HC NTSTY MODUL RAD TX DLVR CPLX: CPT | Performed by: RADIOLOGY

## 2020-08-26 PROCEDURE — 96413 CHEMO IV INFUSION 1 HR: CPT

## 2020-08-26 PROCEDURE — 6360000002 HC RX W HCPCS: Performed by: INTERNAL MEDICINE

## 2020-08-26 PROCEDURE — 96375 TX/PRO/DX INJ NEW DRUG ADDON: CPT

## 2020-08-26 PROCEDURE — 96415 CHEMO IV INFUSION ADDL HR: CPT

## 2020-08-26 PROCEDURE — 96366 THER/PROPH/DIAG IV INF ADDON: CPT

## 2020-08-26 PROCEDURE — 96374 THER/PROPH/DIAG INJ IV PUSH: CPT

## 2020-08-26 PROCEDURE — 80053 COMPREHEN METABOLIC PANEL: CPT

## 2020-08-26 PROCEDURE — 77014 PR CT GUIDANCE PLACEMENT RAD THERAPY FIELDS: CPT | Performed by: RADIOLOGY

## 2020-08-26 PROCEDURE — 2580000003 HC RX 258: Performed by: INTERNAL MEDICINE

## 2020-08-26 PROCEDURE — 85025 COMPLETE CBC W/AUTO DIFF WBC: CPT

## 2020-08-26 RX ORDER — MEPERIDINE HYDROCHLORIDE 50 MG/ML
12.5 INJECTION INTRAMUSCULAR; INTRAVENOUS; SUBCUTANEOUS ONCE
Status: CANCELLED | OUTPATIENT
Start: 2020-08-26

## 2020-08-26 RX ORDER — SODIUM CHLORIDE 9 MG/ML
INJECTION, SOLUTION INTRAVENOUS CONTINUOUS
Status: CANCELLED | OUTPATIENT
Start: 2020-09-02

## 2020-08-26 RX ORDER — SODIUM CHLORIDE 9 MG/ML
20 INJECTION, SOLUTION INTRAVENOUS ONCE
Status: CANCELLED | OUTPATIENT
Start: 2020-09-02

## 2020-08-26 RX ORDER — EPINEPHRINE 1 MG/ML
0.3 INJECTION, SOLUTION, CONCENTRATE INTRAVENOUS PRN
Status: CANCELLED | OUTPATIENT
Start: 2020-09-09

## 2020-08-26 RX ORDER — HEPARIN SODIUM (PORCINE) LOCK FLUSH IV SOLN 100 UNIT/ML 100 UNIT/ML
500 SOLUTION INTRAVENOUS PRN
Status: CANCELLED | OUTPATIENT
Start: 2020-09-02

## 2020-08-26 RX ORDER — METHYLPREDNISOLONE SODIUM SUCCINATE 125 MG/2ML
125 INJECTION, POWDER, LYOPHILIZED, FOR SOLUTION INTRAMUSCULAR; INTRAVENOUS ONCE
Status: CANCELLED | OUTPATIENT
Start: 2020-09-09

## 2020-08-26 RX ORDER — DIPHENHYDRAMINE HYDROCHLORIDE 50 MG/ML
50 INJECTION INTRAMUSCULAR; INTRAVENOUS ONCE
Status: COMPLETED | OUTPATIENT
Start: 2020-08-26 | End: 2020-08-26

## 2020-08-26 RX ORDER — DIPHENHYDRAMINE HYDROCHLORIDE 50 MG/ML
50 INJECTION INTRAMUSCULAR; INTRAVENOUS ONCE
Status: CANCELLED | OUTPATIENT
Start: 2020-09-02

## 2020-08-26 RX ORDER — DIPHENHYDRAMINE HYDROCHLORIDE 50 MG/ML
50 INJECTION INTRAMUSCULAR; INTRAVENOUS ONCE
Status: CANCELLED | OUTPATIENT
Start: 2020-08-26

## 2020-08-26 RX ORDER — SODIUM CHLORIDE 9 MG/ML
INJECTION, SOLUTION INTRAVENOUS CONTINUOUS
Status: CANCELLED | OUTPATIENT
Start: 2020-08-26

## 2020-08-26 RX ORDER — DIPHENHYDRAMINE HYDROCHLORIDE 50 MG/ML
50 INJECTION INTRAMUSCULAR; INTRAVENOUS ONCE
Status: CANCELLED | OUTPATIENT
Start: 2020-09-09

## 2020-08-26 RX ORDER — SODIUM CHLORIDE 0.9 % (FLUSH) 0.9 %
10 SYRINGE (ML) INJECTION PRN
Status: CANCELLED | OUTPATIENT
Start: 2020-09-02

## 2020-08-26 RX ORDER — PALONOSETRON 0.05 MG/ML
0.25 INJECTION, SOLUTION INTRAVENOUS ONCE
Status: COMPLETED | OUTPATIENT
Start: 2020-08-26 | End: 2020-08-26

## 2020-08-26 RX ORDER — SODIUM CHLORIDE 0.9 % (FLUSH) 0.9 %
5 SYRINGE (ML) INJECTION PRN
Status: CANCELLED | OUTPATIENT
Start: 2020-09-09

## 2020-08-26 RX ORDER — EPINEPHRINE 1 MG/ML
0.3 INJECTION, SOLUTION, CONCENTRATE INTRAVENOUS PRN
Status: CANCELLED | OUTPATIENT
Start: 2020-09-02

## 2020-08-26 RX ORDER — SODIUM CHLORIDE 9 MG/ML
20 INJECTION, SOLUTION INTRAVENOUS ONCE
Status: CANCELLED | OUTPATIENT
Start: 2020-09-09

## 2020-08-26 RX ORDER — MEPERIDINE HYDROCHLORIDE 50 MG/ML
12.5 INJECTION INTRAMUSCULAR; INTRAVENOUS; SUBCUTANEOUS ONCE
Status: CANCELLED | OUTPATIENT
Start: 2020-09-02

## 2020-08-26 RX ORDER — METHYLPREDNISOLONE SODIUM SUCCINATE 125 MG/2ML
125 INJECTION, POWDER, LYOPHILIZED, FOR SOLUTION INTRAMUSCULAR; INTRAVENOUS ONCE
Status: CANCELLED | OUTPATIENT
Start: 2020-09-02

## 2020-08-26 RX ORDER — PALONOSETRON 0.05 MG/ML
0.25 INJECTION, SOLUTION INTRAVENOUS ONCE
Status: CANCELLED | OUTPATIENT
Start: 2020-08-26

## 2020-08-26 RX ORDER — SODIUM CHLORIDE 0.9 % (FLUSH) 0.9 %
5 SYRINGE (ML) INJECTION PRN
Status: CANCELLED | OUTPATIENT
Start: 2020-09-02

## 2020-08-26 RX ORDER — MEPERIDINE HYDROCHLORIDE 50 MG/ML
12.5 INJECTION INTRAMUSCULAR; INTRAVENOUS; SUBCUTANEOUS ONCE
Status: CANCELLED | OUTPATIENT
Start: 2020-09-09

## 2020-08-26 RX ORDER — SODIUM CHLORIDE 9 MG/ML
INJECTION, SOLUTION INTRAVENOUS CONTINUOUS
Status: CANCELLED | OUTPATIENT
Start: 2020-09-09

## 2020-08-26 RX ORDER — METHYLPREDNISOLONE SODIUM SUCCINATE 125 MG/2ML
125 INJECTION, POWDER, LYOPHILIZED, FOR SOLUTION INTRAMUSCULAR; INTRAVENOUS ONCE
Status: CANCELLED | OUTPATIENT
Start: 2020-08-26

## 2020-08-26 RX ORDER — SODIUM CHLORIDE 0.9 % (FLUSH) 0.9 %
10 SYRINGE (ML) INJECTION PRN
Status: CANCELLED | OUTPATIENT
Start: 2020-09-09

## 2020-08-26 RX ORDER — HEPARIN SODIUM (PORCINE) LOCK FLUSH IV SOLN 100 UNIT/ML 100 UNIT/ML
500 SOLUTION INTRAVENOUS PRN
Status: CANCELLED | OUTPATIENT
Start: 2020-09-09

## 2020-08-26 RX ORDER — PALONOSETRON 0.05 MG/ML
0.25 INJECTION, SOLUTION INTRAVENOUS ONCE
Status: CANCELLED | OUTPATIENT
Start: 2020-09-02

## 2020-08-26 RX ORDER — SODIUM CHLORIDE 9 MG/ML
20 INJECTION, SOLUTION INTRAVENOUS ONCE
Status: DISCONTINUED | OUTPATIENT
Start: 2020-08-26 | End: 2020-08-27 | Stop reason: HOSPADM

## 2020-08-26 RX ORDER — HEPARIN SODIUM (PORCINE) LOCK FLUSH IV SOLN 100 UNIT/ML 100 UNIT/ML
500 SOLUTION INTRAVENOUS PRN
Status: DISCONTINUED | OUTPATIENT
Start: 2020-08-26 | End: 2020-08-27 | Stop reason: HOSPADM

## 2020-08-26 RX ORDER — SODIUM CHLORIDE 0.9 % (FLUSH) 0.9 %
10 SYRINGE (ML) INJECTION PRN
Status: CANCELLED | OUTPATIENT
Start: 2020-08-26

## 2020-08-26 RX ORDER — SODIUM CHLORIDE 0.9 % (FLUSH) 0.9 %
5 SYRINGE (ML) INJECTION PRN
Status: CANCELLED | OUTPATIENT
Start: 2020-08-26

## 2020-08-26 RX ORDER — HEPARIN SODIUM (PORCINE) LOCK FLUSH IV SOLN 100 UNIT/ML 100 UNIT/ML
500 SOLUTION INTRAVENOUS PRN
Status: CANCELLED | OUTPATIENT
Start: 2020-08-26

## 2020-08-26 RX ORDER — SODIUM CHLORIDE 0.9 % (FLUSH) 0.9 %
10 SYRINGE (ML) INJECTION PRN
Status: DISCONTINUED | OUTPATIENT
Start: 2020-08-26 | End: 2020-08-27 | Stop reason: HOSPADM

## 2020-08-26 RX ORDER — PALONOSETRON 0.05 MG/ML
0.25 INJECTION, SOLUTION INTRAVENOUS ONCE
Status: CANCELLED | OUTPATIENT
Start: 2020-09-09

## 2020-08-26 RX ORDER — SODIUM CHLORIDE 9 MG/ML
20 INJECTION, SOLUTION INTRAVENOUS ONCE
Status: CANCELLED | OUTPATIENT
Start: 2020-08-26

## 2020-08-26 RX ORDER — EPINEPHRINE 1 MG/ML
0.3 INJECTION, SOLUTION, CONCENTRATE INTRAVENOUS PRN
Status: CANCELLED | OUTPATIENT
Start: 2020-08-26

## 2020-08-26 RX ADMIN — FAMOTIDINE 20 MG: 10 INJECTION, SOLUTION INTRAVENOUS at 10:35

## 2020-08-26 RX ADMIN — SODIUM CHLORIDE, PRESERVATIVE FREE 10 ML: 5 INJECTION INTRAVENOUS at 12:41

## 2020-08-26 RX ADMIN — DIPHENHYDRAMINE HYDROCHLORIDE 50 MG: 50 INJECTION INTRAMUSCULAR; INTRAVENOUS at 10:26

## 2020-08-26 RX ADMIN — Medication 500 UNITS: at 12:41

## 2020-08-26 RX ADMIN — SODIUM CHLORIDE 20 ML/HR: 9 INJECTION, SOLUTION INTRAVENOUS at 10:26

## 2020-08-26 RX ADMIN — PACLITAXEL 90 MG: 6 INJECTION, SOLUTION, CONCENTRATE INTRAVENOUS at 10:55

## 2020-08-26 RX ADMIN — PALONOSETRON 0.25 MG: 0.25 INJECTION, SOLUTION INTRAVENOUS at 10:33

## 2020-08-26 RX ADMIN — DEXAMETHASONE SODIUM PHOSPHATE 10 MG: 10 INJECTION INTRAMUSCULAR; INTRAVENOUS at 10:38

## 2020-08-26 RX ADMIN — CARBOPLATIN 180 MG: 10 INJECTION, SOLUTION INTRAVENOUS at 12:02

## 2020-08-26 NOTE — PROGRESS NOTES
Patient ambulated to treatment room with assist of cane. Gait slow and steady. C/O neuropathy in his feet. States he has been taking \"water pill\" for swelling in bilateral feet. States he had some nausea after last treatment along with taste changes. Occasional diarrhea and constipation. SOB with exertion due to \"severe COPD. \"  CBCD and CMP done. Chemo given as ordered. RTC 1 week for chemo. RT continues.

## 2020-08-27 ENCOUNTER — HOSPITAL ENCOUNTER (OUTPATIENT)
Dept: RADIATION ONCOLOGY | Age: 65
Discharge: HOME | End: 2020-08-27
Attending: RADIOLOGY
Payer: COMMERCIAL

## 2020-08-27 PROCEDURE — 77386 HC NTSTY MODUL RAD TX DLVR CPLX: CPT | Performed by: RADIOLOGY

## 2020-08-27 PROCEDURE — 77014 PR CT GUIDANCE PLACEMENT RAD THERAPY FIELDS: CPT | Performed by: RADIOLOGY

## 2020-08-28 ENCOUNTER — HOSPITAL ENCOUNTER (OUTPATIENT)
Dept: RADIATION ONCOLOGY | Age: 65
Discharge: HOME | End: 2020-08-28
Attending: RADIOLOGY
Payer: COMMERCIAL

## 2020-08-28 PROCEDURE — 77386 HC NTSTY MODUL RAD TX DLVR CPLX: CPT | Performed by: RADIOLOGY

## 2020-08-28 PROCEDURE — 77014 PR CT GUIDANCE PLACEMENT RAD THERAPY FIELDS: CPT | Performed by: RADIOLOGY

## 2020-08-31 ENCOUNTER — HOSPITAL ENCOUNTER (OUTPATIENT)
Dept: RADIATION ONCOLOGY | Age: 65
Discharge: HOME | End: 2020-08-31
Attending: RADIOLOGY
Payer: COMMERCIAL

## 2020-08-31 VITALS — BODY MASS INDEX: 26.26 KG/M2 | WEIGHT: 177.8 LBS

## 2020-08-31 PROCEDURE — 77427 RADIATION TX MANAGEMENT X5: CPT | Performed by: RADIOLOGY

## 2020-08-31 PROCEDURE — 77336 RADIATION PHYSICS CONSULT: CPT | Performed by: RADIOLOGY

## 2020-08-31 PROCEDURE — 77014 PR CT GUIDANCE PLACEMENT RAD THERAPY FIELDS: CPT | Performed by: RADIOLOGY

## 2020-08-31 PROCEDURE — 77386 HC NTSTY MODUL RAD TX DLVR CPLX: CPT | Performed by: RADIOLOGY

## 2020-08-31 ASSESSMENT — PAIN SCALES - GENERAL: PAINLEVEL_OUTOF10: 0

## 2020-08-31 NOTE — PROGRESS NOTES
Weekly Radiation Treatment Progress Note    DATE OF SERVICE: 8/31/2020     DIAGNOSIS:  Cancer Staging  Primary malignant neoplasm of bronchus of right middle lobe [facility])  Staging form: Lung, AJCC 8th Edition  - Clinical: Stage IV (pM1) - Signed by Raynette Felty, MD on 7/6/2020       TREATMENT COURSE:   Oncology History   Secondary malignant neoplasm of brain ([address]   6/13/2020 Initial Diagnosis    Secondary malignant neoplasm of brain ([address]     6/13/2020 Surgery    Resection left parietal metastasis         Site: Right Lung  Current Radiation Dose: 2000 cGy    Subjective:    Doing well without chest pain or dysphagia      EXAM  There were no vitals filed for this visit.   NAD  No skin changes  Respirations unlabored    Setup images, chart, plan reviewed      A/P:   Tolerating tx well  Continue RT as planned      Electronically signed by Awa Eric MD on 8/31/2020 at 9:23 AM

## 2020-09-01 ENCOUNTER — HOSPITAL ENCOUNTER (OUTPATIENT)
Dept: RADIATION ONCOLOGY | Age: 65
Discharge: HOME | End: 2020-09-01
Attending: RADIOLOGY
Payer: COMMERCIAL

## 2020-09-01 PROCEDURE — 77386 HC NTSTY MODUL RAD TX DLVR CPLX: CPT | Performed by: RADIOLOGY

## 2020-09-01 PROCEDURE — 77014 PR CT GUIDANCE PLACEMENT RAD THERAPY FIELDS: CPT | Performed by: RADIOLOGY

## 2020-09-02 ENCOUNTER — HOSPITAL ENCOUNTER (OUTPATIENT)
Dept: INFUSION THERAPY | Age: 65
Discharge: HOME | End: 2020-09-02
Payer: COMMERCIAL

## 2020-09-02 ENCOUNTER — HOSPITAL ENCOUNTER (OUTPATIENT)
Dept: RADIATION ONCOLOGY | Age: 65
Discharge: HOME | End: 2020-09-02
Attending: RADIOLOGY
Payer: COMMERCIAL

## 2020-09-02 ENCOUNTER — TELEPHONE (OUTPATIENT)
Dept: INFUSION THERAPY | Age: 65
End: 2020-09-02

## 2020-09-02 VITALS
BODY MASS INDEX: 25.92 KG/M2 | RESPIRATION RATE: 16 BRPM | DIASTOLIC BLOOD PRESSURE: 67 MMHG | OXYGEN SATURATION: 99 % | SYSTOLIC BLOOD PRESSURE: 108 MMHG | WEIGHT: 175 LBS | TEMPERATURE: 97.9 F | HEART RATE: 121 BPM | HEIGHT: 69 IN

## 2020-09-02 DIAGNOSIS — C34.2 PRIMARY MALIGNANT NEOPLASM OF BRONCHUS OF RIGHT MIDDLE LOBE (HCC): Primary | ICD-10-CM

## 2020-09-02 LAB
ALBUMIN SERPL-MCNC: 3.3 GM/DL (ref 3.4–5)
ALP BLD-CCNC: 166 IU/L (ref 40–129)
ALT SERPL-CCNC: 8 U/L (ref 10–40)
ANION GAP SERPL CALCULATED.3IONS-SCNC: 10 MMOL/L (ref 4–16)
AST SERPL-CCNC: 14 IU/L (ref 15–37)
BASOPHILS ABSOLUTE: 0 K/CU MM
BASOPHILS RELATIVE PERCENT: 0.2 % (ref 0–1)
BILIRUB SERPL-MCNC: 0.6 MG/DL (ref 0–1)
BUN BLDV-MCNC: 14 MG/DL (ref 6–23)
CALCIUM SERPL-MCNC: 8.4 MG/DL (ref 8.3–10.6)
CHLORIDE BLD-SCNC: 92 MMOL/L (ref 99–110)
CO2: 30 MMOL/L (ref 21–32)
CREAT SERPL-MCNC: 1 MG/DL (ref 0.9–1.3)
DIFFERENTIAL TYPE: ABNORMAL
EOSINOPHILS ABSOLUTE: 0 K/CU MM
EOSINOPHILS RELATIVE PERCENT: 0.5 % (ref 0–3)
GFR AFRICAN AMERICAN: >60 ML/MIN/1.73M2
GFR AFRICAN AMERICAN: >60 ML/MIN/1.73M2
GFR NON-AFRICAN AMERICAN: >60 ML/MIN/1.73M2
GFR NON-AFRICAN AMERICAN: >60 ML/MIN/1.73M2
GLUCOSE BLD-MCNC: 109 MG/DL (ref 70–99)
GLUCOSE BLD-MCNC: 113 MG/DL (ref 70–99)
HCT VFR BLD CALC: 36.9 % (ref 42–52)
HEMOGLOBIN: 12.3 GM/DL (ref 13.5–18)
LYMPHOCYTES ABSOLUTE: 0.4 K/CU MM
LYMPHOCYTES RELATIVE PERCENT: 5.5 % (ref 24–44)
MCH RBC QN AUTO: 28.4 PG (ref 27–31)
MCHC RBC AUTO-ENTMCNC: 33.3 % (ref 32–36)
MCV RBC AUTO: 85.2 FL (ref 78–100)
MONOCYTES ABSOLUTE: 1.1 K/CU MM
MONOCYTES RELATIVE PERCENT: 16.9 % (ref 0–4)
PDW BLD-RTO: 14.2 % (ref 11.7–14.9)
PLATELET # BLD: 237 K/CU MM (ref 140–440)
PMV BLD AUTO: 8.5 FL (ref 7.5–11.1)
POC CALCIUM: 1.11 MMOL/L (ref 1.12–1.32)
POC CHLORIDE: 94 MMOL/L (ref 98–109)
POC CREATININE: 0.9 MG/DL (ref 0.9–1.3)
POTASSIUM SERPL-SCNC: 2.8 MMOL/L (ref 3.6–5.1)
POTASSIUM SERPL-SCNC: 3.1 MMOL/L (ref 3.5–5.1)
RBC # BLD: 4.33 M/CU MM (ref 4.6–6.2)
SEGMENTED NEUTROPHILS ABSOLUTE COUNT: 4.9 K/CU MM
SEGMENTED NEUTROPHILS RELATIVE PERCENT: 76.9 % (ref 36–66)
SODIUM BLD-SCNC: 132 MMOL/L (ref 135–145)
SODIUM BLD-SCNC: 136 MMOL/L (ref 136–145)
TOTAL PROTEIN: 5.6 GM/DL (ref 6.4–8.2)
WBC # BLD: 6.4 K/CU MM (ref 4–10.5)

## 2020-09-02 PROCEDURE — 77014 PR CT GUIDANCE PLACEMENT RAD THERAPY FIELDS: CPT | Performed by: RADIOLOGY

## 2020-09-02 PROCEDURE — 77386 HC NTSTY MODUL RAD TX DLVR CPLX: CPT | Performed by: RADIOLOGY

## 2020-09-02 PROCEDURE — 2500000003 HC RX 250 WO HCPCS: Performed by: INTERNAL MEDICINE

## 2020-09-02 PROCEDURE — 96375 TX/PRO/DX INJ NEW DRUG ADDON: CPT

## 2020-09-02 PROCEDURE — 96417 CHEMO IV INFUS EACH ADDL SEQ: CPT

## 2020-09-02 PROCEDURE — 96413 CHEMO IV INFUSION 1 HR: CPT

## 2020-09-02 PROCEDURE — 2580000003 HC RX 258: Performed by: INTERNAL MEDICINE

## 2020-09-02 PROCEDURE — 6360000002 HC RX W HCPCS: Performed by: INTERNAL MEDICINE

## 2020-09-02 PROCEDURE — 85025 COMPLETE CBC W/AUTO DIFF WBC: CPT

## 2020-09-02 PROCEDURE — 80053 COMPREHEN METABOLIC PANEL: CPT

## 2020-09-02 RX ORDER — HEPARIN SODIUM (PORCINE) LOCK FLUSH IV SOLN 100 UNIT/ML 100 UNIT/ML
500 SOLUTION INTRAVENOUS PRN
Status: DISCONTINUED | OUTPATIENT
Start: 2020-09-02 | End: 2020-09-03 | Stop reason: HOSPADM

## 2020-09-02 RX ORDER — SODIUM CHLORIDE 0.9 % (FLUSH) 0.9 %
10 SYRINGE (ML) INJECTION PRN
Status: DISCONTINUED | OUTPATIENT
Start: 2020-09-02 | End: 2020-09-03 | Stop reason: HOSPADM

## 2020-09-02 RX ORDER — POTASSIUM CHLORIDE 20 MEQ/1
20 TABLET, EXTENDED RELEASE ORAL 2 TIMES DAILY
Qty: 10 TABLET | Refills: 0 | Status: SHIPPED | OUTPATIENT
Start: 2020-09-02 | End: 2020-10-01 | Stop reason: SDUPTHER

## 2020-09-02 RX ORDER — SODIUM CHLORIDE 9 MG/ML
20 INJECTION, SOLUTION INTRAVENOUS ONCE
Status: DISCONTINUED | OUTPATIENT
Start: 2020-09-02 | End: 2020-09-03 | Stop reason: HOSPADM

## 2020-09-02 RX ORDER — PALONOSETRON 0.05 MG/ML
0.25 INJECTION, SOLUTION INTRAVENOUS ONCE
Status: COMPLETED | OUTPATIENT
Start: 2020-09-02 | End: 2020-09-02

## 2020-09-02 RX ORDER — DIPHENHYDRAMINE HYDROCHLORIDE 50 MG/ML
50 INJECTION INTRAMUSCULAR; INTRAVENOUS ONCE
Status: COMPLETED | OUTPATIENT
Start: 2020-09-02 | End: 2020-09-02

## 2020-09-02 RX ORDER — ABACAVIR SULFATE, DOLUTEGRAVIR SODIUM, LAMIVUDINE 600; 50; 300 MG/1; MG/1; MG/1
TABLET, FILM COATED ORAL
Qty: 30 TABLET | Refills: 4 | Status: SHIPPED | OUTPATIENT
Start: 2020-09-02

## 2020-09-02 RX ADMIN — FAMOTIDINE 20 MG: 10 INJECTION, SOLUTION INTRAVENOUS at 11:04

## 2020-09-02 RX ADMIN — PACLITAXEL 90 MG: 6 INJECTION, SOLUTION, CONCENTRATE INTRAVENOUS at 11:38

## 2020-09-02 RX ADMIN — PALONOSETRON 0.25 MG: 0.25 INJECTION, SOLUTION INTRAVENOUS at 11:04

## 2020-09-02 RX ADMIN — DEXAMETHASONE SODIUM PHOSPHATE 10 MG: 10 INJECTION INTRAMUSCULAR; INTRAVENOUS at 11:15

## 2020-09-02 RX ADMIN — CARBOPLATIN 236 MG: 10 INJECTION, SOLUTION INTRAVENOUS at 12:49

## 2020-09-02 RX ADMIN — DIPHENHYDRAMINE HYDROCHLORIDE 50 MG: 50 INJECTION INTRAMUSCULAR; INTRAVENOUS at 11:04

## 2020-09-02 RX ADMIN — Medication 500 UNITS: at 13:29

## 2020-09-02 RX ADMIN — SODIUM CHLORIDE 20 ML/HR: 9 INJECTION, SOLUTION INTRAVENOUS at 11:03

## 2020-09-02 NOTE — PROGRESS NOTES
Pt. Here for radiation and chemo. Pt. Denies any questions or concerns. Pt. Left upper chest mediport accessed without difficulty, good blood return noted. Lab work drawn as ordered. Labs reviewed. Patient's status assessed and documented appropriately. All labs and required results were also reviewed today. Treatment parameters have been reviewed. Today's treatment has been approved by the provider. Treatment orders and medication sequencing (when applicable) was verified by 2 registered nurses. The treatment plan was confirmed with the patient prior to administration, and the patient understands the need to report any treatment-related symptoms. Prior to administration, when applicable, the following 8 elements of medication administration were reviewed with 2nd Registered Nurse prior to dosing: drug name, drug dose, infusion volume when prepared in a syringe, rate of administration, expiration dates and/or times, appearance and integrity of drug(s), and rate of pump for infusion. The 5 rights of medication administration have been verified.

## 2020-09-03 ENCOUNTER — HOSPITAL ENCOUNTER (OUTPATIENT)
Dept: RADIATION ONCOLOGY | Age: 65
Discharge: HOME | End: 2020-09-03
Attending: RADIOLOGY
Payer: COMMERCIAL

## 2020-09-03 PROCEDURE — 77386 HC NTSTY MODUL RAD TX DLVR CPLX: CPT | Performed by: RADIOLOGY

## 2020-09-03 PROCEDURE — 77014 PR CT GUIDANCE PLACEMENT RAD THERAPY FIELDS: CPT | Performed by: RADIOLOGY

## 2020-09-04 ENCOUNTER — HOSPITAL ENCOUNTER (OUTPATIENT)
Dept: RADIATION ONCOLOGY | Age: 65
Discharge: HOME | End: 2020-09-04
Attending: RADIOLOGY
Payer: COMMERCIAL

## 2020-09-04 PROCEDURE — 77386 HC NTSTY MODUL RAD TX DLVR CPLX: CPT | Performed by: RADIOLOGY

## 2020-09-04 PROCEDURE — 77014 PR CT GUIDANCE PLACEMENT RAD THERAPY FIELDS: CPT | Performed by: RADIOLOGY

## 2020-09-08 ENCOUNTER — HOSPITAL ENCOUNTER (OUTPATIENT)
Dept: RADIATION ONCOLOGY | Age: 65
Discharge: HOME | End: 2020-09-08
Attending: RADIOLOGY
Payer: COMMERCIAL

## 2020-09-08 VITALS — WEIGHT: 174 LBS | BODY MASS INDEX: 25.7 KG/M2

## 2020-09-08 PROCEDURE — 77014 PR CT GUIDANCE PLACEMENT RAD THERAPY FIELDS: CPT | Performed by: RADIOLOGY

## 2020-09-08 PROCEDURE — 77427 RADIATION TX MANAGEMENT X5: CPT | Performed by: RADIOLOGY

## 2020-09-08 PROCEDURE — 77386 HC NTSTY MODUL RAD TX DLVR CPLX: CPT | Performed by: RADIOLOGY

## 2020-09-08 PROCEDURE — 77336 RADIATION PHYSICS CONSULT: CPT | Performed by: RADIOLOGY

## 2020-09-08 ASSESSMENT — PAIN SCALES - GENERAL: PAINLEVEL_OUTOF10: 0

## 2020-09-08 NOTE — PLAN OF CARE
Care plan reviewed. _ Pt reports increased productive cough with thick clear phlegm. Denies fever. Pt states coughing normal for him. No resp distress noted. _ Pt fell at home last Friday, bruises noted to arms and legs. Pt denies injury, reports soreness.

## 2020-09-08 NOTE — PROGRESS NOTES
and attention on follow-up imaging is recommended. Surrounding vasogenic edema and mass effect have improved. New tiny focus of restricted diffusion in the left occipital lobe which could be a recent infarct (acute to subacute). No hemorrhage or mass effect associated with this. He was subsequently referred to me for further evaluation. He was seen by radiation oncologist at [facility] and he received SBRT to his brain surgical site between 7/15 and 7/17/2020. PET/CT scan done on July 9, 2020 showed Intense metabolic activity associated with the masslike opacification of the right middle lobe which may represent primary lung cancer and/or postobstructive pneumonia. Metabolically active right hilar and subcarinal lymph node metastasis. Solitary metabolically active skeletal metastasis in the left aspect of the sacrum. Definitive concurrent chemoradiation therapy was started since 8/18/2020 (started RT on 8/18/20 and chemo on 8/19/20). On September 9, 2020, he presented to me for follow-up. I have been following him for metastatic non small cell lung cancer. He is s/p surgery followed by SBRT to his solitary brain metastasis. He is currently on definitive concurrent chemoradiation therapy to his lung lesions since 8/18/20, because he only has oligo metastasis. We decided to give concomitant chemoradiation therapy to his lung lesion, followed by consolidation RT to his left sacrum. After that, I will proceed with first line chemotherapy with pembrolizumab. He is tolerating concurrent chemoradiation therapy very well and he does not encounter any major side effects from it. I recommended for him to continue with current chemoradiation therapy and we will continue to follow him closely during chemotherapy and radiation therapy. He continues to have leg pain for which his PCP started him on Tramadol yesterday. He reports relief. He has adequate bowel regimen.      He doesn't have any significant symptoms at today visit. Past Medical History  Significant for  1. HIV/AIDS since 2005  2. COPD  3. GERD  4. 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 Right inguinal hernia repair   2. Tonsillectomy in 1960s  3. Left wrist fracture surgery in 2/28/2020  4. Brain metastasis removal on June 2020    Allergies  No known medication allergies    Social History  He is a former smoker and he quit smoking in 2005. He used to smoke 1 pack per day for about 32 years. He is currently living in [facility]. Family History  Significant for breast cancer in his mother, melanoma in his maternal uncle, lung cancer in his maternal grandmother and leukemia in his maternal grandfather. Interval history:   He presented for scheduled follow-up and treatment with cycle 4-day 1 Taxol/carboplatin. He denies fever or infectious symptoms. He has chronic stable cough. He denies hemoptysis. He reports he went to his family doctor yesterday and was given tramadol for \"leg veins being gone \". He reports tremendous relief from the tramadol. He has adequate bowel regimen in place and uses OTC stool softener for constipation. He denies chest pain, shortness of breath. He is sleeping well. No oral oral ulcers, no neuropathy to fingers. He denies easy bleeding or bruising. He reports appetite has been diminished with some associated weight loss. He is unwilling to use boost or Ensure as he does not like it. He reports just yesterday he bought something to help make milkshakes at home and have awareness of his weight loss and is willing to work on this at home. Review of Systems: \"Per interval history; otherwise 10 point ROS is negative. \"      Vital Signs:  /69 (Position: Sitting)   Pulse 118   Temp 97.7 °F (36.5 °C) (Infrared)   Resp 16   Ht 5' 9\" (1.753 m)   Wt 172 lb (78 kg)   SpO2 94%   BMI 25.40 kg/m²     Physical Exam:  CONSTITUTIONAL: awake, alert, cooperative, no apparent distress   EYES: pupils equal, round and reactive to light, sclera clear and conjunctiva normal  ENT: Normocephalic, without obvious abnormality, atraumatic  NECK: supple, symmetrical, no jugular venous distension and no carotid bruits   HEMATOLOGIC/LYMPHATIC: no cervical, supraclavicular or axillary lymphadenopathy   LUNGS: no increased work of breathing and clear to auscultation   CARDIOVASCULAR: regular rate and rhythm, normal S1 and S2, no murmur noted  ABDOMEN: normal bowel sounds x 4, soft, non-distended, non-tender, no masses palpated, no hepatosplenomegaly   MUSCULOSKELETAL: full range of motion noted, tone is normal  NEUROLOGIC: awake, alert, oriented to name, place and time. Motor skills grossly intact. SKIN: Normal skin color, texture, turgor and no jaundice.  appears intact   EXTREMITIES: no LE edema     Labs:  Hematology:  Lab Results   Component Value Date    WBC 6.8 09/09/2020    RBC 4.26 (L) 09/09/2020    HGB 12.0 (L) 09/09/2020    HCT 36.4 (L) 09/09/2020    MCV 85.4 09/09/2020    MCH 28.2 09/09/2020    MCHC 33.0 09/09/2020    RDW 14.9 09/09/2020     09/09/2020    MPV 8.4 09/09/2020    SEGSPCT 79.1 (H) 09/09/2020    EOSRELPCT 0.1 09/09/2020    BASOPCT 0.3 09/09/2020    LYMPHOPCT 5.3 (L) 09/09/2020    MONOPCT 15.2 (H) 09/09/2020    SEGSABS 5.4 09/09/2020    EOSABS 0.0 09/09/2020    BASOSABS 0.0 09/09/2020    LYMPHSABS 0.4 09/09/2020    MONOSABS 1.0 09/09/2020    DIFFTYPE AUTOMATED DIFFERENTIAL 09/09/2020     No results found for: ESR  Chemistry:  Lab Results   Component Value Date     (L) 09/09/2020    K 3.3 (L) 09/09/2020    CL 92 (L) 09/02/2020    CO2 30 09/02/2020    BUN 14 09/02/2020    CREATININE 1.2 09/09/2020    GLUCOSE 109 (H) 09/02/2020    CALCIUM 8.4 09/02/2020    PROT 5.6 (L) 09/02/2020    LABALBU 3.3 (L) 09/02/2020    BILITOT 0.6 09/02/2020    ALKPHOS 166 (H) 09/02/2020    AST 14 (L) 09/02/2020    ALT 8 (L) 09/02/2020    LABGLOM >60 09/09/2020    GFRAA >60 09/09/2020 AGRATIO 0 06/22/2020    GLOB 0 06/22/2020    PHOS 3.7 08/31/2016    MG 2.5 (H) 06/13/2020    POCCA 1.06 (L) 09/09/2020    POCGLU 116 (H) 09/09/2020     No results found for: MMA, LDH, HOMOCYSTEINE  No components found for: LD  Lab Results   Component Value Date    TSHHS 1.720 05/09/2017    T4FREE 1.47 08/29/2016     Immunology:  Lab Results   Component Value Date    PROT 5.6 (L) 09/02/2020     No results found for: Marella Petite, KLFLCR  No results found for: B2M  Coagulation Panel:  Lab Results   Component Value Date    PROTIME 11.0 04/23/2013    INR 1.01 04/23/2013    APTT 29.1 06/05/2012    DDIMER 297 (H) 09/20/2015     Anemia Panel:  Lab Results   Component Value Date    HPPWHJGN83 388.0 08/29/2016    FOLATE 8.8 08/29/2016     Tumor Markers:  Lab Results   Component Value Date    CEA 3.4 08/30/2016     17.7 08/30/2016    PSA 2.03 03/13/2013     Observations:  No data recorded      Assessment & Plan:   Metastatic non-small cell lung cancer - adenocarcinoma  Brain metastasis - s/p excision    PLAN  Becca Gay is a 70-year-old very pleasant gentleman who was found to have right middle lobe lung mass with left parietal lobe brain metastasis when he presented with seizure on June 13, 2020. Further work ups with bronchoscopy, EBUS on 6/15/20 showed collapse of RML lateral segment bronchus. Final pathology from EBUS guided biopsy of subcarinal lymph node was consistent with  lung adenocarcinoma. PD-L1 was positive (95%). He was subsequently referred to me for further evaluation. He was seen by radiation oncologist at [address] [place] [address] and he received SBRT to his brain surgical site between 7/15 and 7/17/2020. PET/CT scan done on July 9, 2020 showed Intense metabolic activity associated with the masslike opacification of the right middle lobe which may represent primary lung cancer and/or postobstructive pneumonia. Metabolically active right hilar and subcarinal lymph node metastasis.  Solitary metabolically active skeletal metastasis in the left aspect of the sacrum. Definitive concurrent chemoradiation therapy was started since 8/18/2020 (started RT on 8/18/20 and chemo on 8/19/20). On September 9, 2020, he presented to me for follow-up. I have been following him for metastatic non small cell lung cancer. He is s/p surgery followed by SBRT to his solitary brain metastasis. He is currently on definitive concurrent chemoradiation therapy to his lung lesions since 8/18/20, because he only has oligo metastasis. We decided to give concomitant chemoradiation therapy to his lung lesion, followed by consolidation RT to his left sacrum. After that, I will proceed with first line chemotherapy with pembrolizumab. He is tolerating concurrent chemoradiation therapy very well and he does not encounter any major side effects from it. I recommended to continue with current chemoradiation therapy and we will continue to follow him closely during chemotherapy and radiation therapy. He has some gradual weight loss. He reports he can not tolerate boost/ensure. He would like to defer further intervention for now. He is going to try using milkshakes several times per day. We will continue to monitor. He was given Tramadol for leg pain, and reports relief. He has adequate bowel regimen. I answered all his questions and concerns for today. I recommend him to follow up with her primary care physician on regular basis. I will continue to keep you updated on his progress. Thank you for allowing me to participate in the care of this very pleasant patient. Recent imaging and labs were reviewed and discussed with the patient. Return in about 3 weeks (around 9/30/2020). ZB  Patient was instructed to stop at our  to make their next appointment before leaving. They will call in the interim with any questions/concerns/new symptoms.    This plan was discussed with the patient and they verbalized understanding and acceptance of the plan. Dr. Almita Anderson was available for consultation for this visit. I have recommended that the patient follow CDC guidelines for prevention of COVID-19 infection.

## 2020-09-08 NOTE — PROGRESS NOTES
Weekly Radiation Treatment Progress Note    DATE OF SERVICE: 9/8/2020     DIAGNOSIS:  Cancer Staging  Primary malignant neoplasm of bronchus of right middle lobe [facility])  Staging form: Lung, AJCC 8th Edition  - Clinical: Stage IV (pM1) - Signed by Jorge Mcnair MD on 7/6/2020       TREATMENT COURSE:   Oncology History   Secondary malignant neoplasm of brain ([facility] [address]   6/13/2020 Initial Diagnosis    Secondary malignant neoplasm of brain ([facility] [address]     6/13/2020 Surgery    Resection left parietal metastasis           Site: R Lung/LNs   Current Total Radiation Dose: 30Gy    Pt doing well. Energy fairly good. No skin itching/soreness. Breathing stable.  No dysphagia/odynophagia      EXAM  Wt Readings from Last 3 Encounters:   09/08/20 174 lb (78.9 kg)   09/02/20 175 lb (79.4 kg)   08/31/20 177 lb 12.8 oz (80.6 kg)     NAD      Setup images, chart, plan reviewed    A/P:   Tolerating RT well  Continue RT as planned      Electronically signed by Jorge Mcnair MD on 9/8/2020 at 9:25 AM

## 2020-09-09 ENCOUNTER — OFFICE VISIT (OUTPATIENT)
Dept: ONCOLOGY | Age: 65
End: 2020-09-09
Payer: COMMERCIAL

## 2020-09-09 ENCOUNTER — HOSPITAL ENCOUNTER (OUTPATIENT)
Dept: INFUSION THERAPY | Age: 65
Discharge: HOME | End: 2020-09-09
Payer: COMMERCIAL

## 2020-09-09 ENCOUNTER — HOSPITAL ENCOUNTER (OUTPATIENT)
Dept: RADIATION ONCOLOGY | Age: 65
Discharge: HOME | End: 2020-09-09
Attending: RADIOLOGY
Payer: COMMERCIAL

## 2020-09-09 VITALS
TEMPERATURE: 97.7 F | RESPIRATION RATE: 16 BRPM | WEIGHT: 172 LBS | HEART RATE: 119 BPM | SYSTOLIC BLOOD PRESSURE: 120 MMHG | DIASTOLIC BLOOD PRESSURE: 69 MMHG | BODY MASS INDEX: 25.4 KG/M2

## 2020-09-09 VITALS
TEMPERATURE: 97.7 F | DIASTOLIC BLOOD PRESSURE: 69 MMHG | BODY MASS INDEX: 25.48 KG/M2 | WEIGHT: 172 LBS | HEIGHT: 69 IN | RESPIRATION RATE: 16 BRPM | SYSTOLIC BLOOD PRESSURE: 120 MMHG | OXYGEN SATURATION: 94 % | HEART RATE: 118 BPM

## 2020-09-09 DIAGNOSIS — C34.2 PRIMARY MALIGNANT NEOPLASM OF BRONCHUS OF RIGHT MIDDLE LOBE (HCC): ICD-10-CM

## 2020-09-09 DIAGNOSIS — Z21 ASYMPTOMATIC HIV INFECTION (HCC): Primary | ICD-10-CM

## 2020-09-09 LAB
ALBUMIN SERPL-MCNC: 3.2 GM/DL (ref 3.4–5)
ALP BLD-CCNC: 159 IU/L (ref 40–128)
ALT SERPL-CCNC: 9 U/L (ref 10–40)
ANION GAP SERPL CALCULATED.3IONS-SCNC: 14 MMOL/L (ref 4–16)
AST SERPL-CCNC: 15 IU/L (ref 15–37)
BASOPHILS ABSOLUTE: 0 K/CU MM
BASOPHILS RELATIVE PERCENT: 0.3 % (ref 0–1)
BILIRUB SERPL-MCNC: 0.7 MG/DL (ref 0–1)
BUN BLDV-MCNC: 19 MG/DL (ref 6–23)
CALCIUM SERPL-MCNC: 8.7 MG/DL (ref 8.3–10.6)
CHLORIDE BLD-SCNC: 92 MMOL/L (ref 99–110)
CO2: 26 MMOL/L (ref 21–32)
CREAT SERPL-MCNC: 1.2 MG/DL (ref 0.9–1.3)
DIFFERENTIAL TYPE: ABNORMAL
EOSINOPHILS ABSOLUTE: 0 K/CU MM
EOSINOPHILS RELATIVE PERCENT: 0.1 % (ref 0–3)
GFR AFRICAN AMERICAN: >60 ML/MIN/1.73M2
GFR AFRICAN AMERICAN: >60 ML/MIN/1.73M2
GFR NON-AFRICAN AMERICAN: >60 ML/MIN/1.73M2
GFR NON-AFRICAN AMERICAN: >60 ML/MIN/1.73M2
GLUCOSE BLD-MCNC: 109 MG/DL (ref 70–99)
GLUCOSE BLD-MCNC: 116 MG/DL (ref 70–99)
HCT VFR BLD CALC: 36.4 % (ref 42–52)
HEMOGLOBIN: 12 GM/DL (ref 13.5–18)
LYMPHOCYTES ABSOLUTE: 0.4 K/CU MM
LYMPHOCYTES RELATIVE PERCENT: 5.3 % (ref 24–44)
MCH RBC QN AUTO: 28.2 PG (ref 27–31)
MCHC RBC AUTO-ENTMCNC: 33 % (ref 32–36)
MCV RBC AUTO: 85.4 FL (ref 78–100)
MONOCYTES ABSOLUTE: 1 K/CU MM
MONOCYTES RELATIVE PERCENT: 15.2 % (ref 0–4)
PDW BLD-RTO: 14.9 % (ref 11.7–14.9)
PLATELET # BLD: 191 K/CU MM (ref 140–440)
PMV BLD AUTO: 8.4 FL (ref 7.5–11.1)
POC CALCIUM: 1.06 MMOL/L (ref 1.12–1.32)
POC CHLORIDE: 95 MMOL/L (ref 98–109)
POC CREATININE: 1.2 MG/DL (ref 0.9–1.3)
POTASSIUM SERPL-SCNC: 3.3 MMOL/L (ref 3.6–5.1)
POTASSIUM SERPL-SCNC: 3.6 MMOL/L (ref 3.5–5.1)
RBC # BLD: 4.26 M/CU MM (ref 4.6–6.2)
SEGMENTED NEUTROPHILS ABSOLUTE COUNT: 5.4 K/CU MM
SEGMENTED NEUTROPHILS RELATIVE PERCENT: 79.1 % (ref 36–66)
SODIUM BLD-SCNC: 130 MMOL/L (ref 136–145)
SODIUM BLD-SCNC: 132 MMOL/L (ref 135–145)
SOURCE, BLOOD GAS: ABNORMAL
TOTAL PROTEIN: 6.2 GM/DL (ref 6.4–8.2)
WBC # BLD: 6.8 K/CU MM (ref 4–10.5)

## 2020-09-09 PROCEDURE — 6360000002 HC RX W HCPCS: Performed by: INTERNAL MEDICINE

## 2020-09-09 PROCEDURE — 99214 OFFICE O/P EST MOD 30 MIN: CPT | Performed by: NURSE PRACTITIONER

## 2020-09-09 PROCEDURE — 2500000003 HC RX 250 WO HCPCS: Performed by: INTERNAL MEDICINE

## 2020-09-09 PROCEDURE — 96417 CHEMO IV INFUS EACH ADDL SEQ: CPT

## 2020-09-09 PROCEDURE — 77014 PR CT GUIDANCE PLACEMENT RAD THERAPY FIELDS: CPT | Performed by: RADIOLOGY

## 2020-09-09 PROCEDURE — 96413 CHEMO IV INFUSION 1 HR: CPT

## 2020-09-09 PROCEDURE — 77386 HC NTSTY MODUL RAD TX DLVR CPLX: CPT | Performed by: RADIOLOGY

## 2020-09-09 PROCEDURE — 2580000003 HC RX 258: Performed by: INTERNAL MEDICINE

## 2020-09-09 PROCEDURE — 80048 BASIC METABOLIC PNL TOTAL CA: CPT

## 2020-09-09 PROCEDURE — 96374 THER/PROPH/DIAG INJ IV PUSH: CPT

## 2020-09-09 PROCEDURE — 85025 COMPLETE CBC W/AUTO DIFF WBC: CPT

## 2020-09-09 PROCEDURE — 80053 COMPREHEN METABOLIC PANEL: CPT

## 2020-09-09 PROCEDURE — 96375 TX/PRO/DX INJ NEW DRUG ADDON: CPT

## 2020-09-09 RX ORDER — TRAMADOL HYDROCHLORIDE 50 MG/1
TABLET ORAL
COMMUNITY
Start: 2020-09-01 | End: 2020-10-21

## 2020-09-09 RX ORDER — SODIUM CHLORIDE 9 MG/ML
20 INJECTION, SOLUTION INTRAVENOUS ONCE
Status: DISCONTINUED | OUTPATIENT
Start: 2020-09-09 | End: 2020-09-10 | Stop reason: HOSPADM

## 2020-09-09 RX ORDER — DIPHENHYDRAMINE HYDROCHLORIDE 50 MG/ML
50 INJECTION INTRAMUSCULAR; INTRAVENOUS ONCE
Status: COMPLETED | OUTPATIENT
Start: 2020-09-09 | End: 2020-09-09

## 2020-09-09 RX ORDER — HEPARIN SODIUM (PORCINE) LOCK FLUSH IV SOLN 100 UNIT/ML 100 UNIT/ML
500 SOLUTION INTRAVENOUS PRN
Status: DISCONTINUED | OUTPATIENT
Start: 2020-09-09 | End: 2020-09-10 | Stop reason: HOSPADM

## 2020-09-09 RX ORDER — PALONOSETRON 0.05 MG/ML
0.25 INJECTION, SOLUTION INTRAVENOUS ONCE
Status: COMPLETED | OUTPATIENT
Start: 2020-09-09 | End: 2020-09-09

## 2020-09-09 RX ADMIN — FAMOTIDINE 20 MG: 10 INJECTION, SOLUTION INTRAVENOUS at 10:22

## 2020-09-09 RX ADMIN — DEXAMETHASONE SODIUM PHOSPHATE 10 MG: 10 INJECTION INTRAMUSCULAR; INTRAVENOUS at 10:41

## 2020-09-09 RX ADMIN — Medication 500 UNITS: at 13:36

## 2020-09-09 RX ADMIN — PALONOSETRON 0.25 MG: 0.25 INJECTION, SOLUTION INTRAVENOUS at 10:24

## 2020-09-09 RX ADMIN — DIPHENHYDRAMINE HYDROCHLORIDE 50 MG: 50 INJECTION INTRAMUSCULAR; INTRAVENOUS at 10:28

## 2020-09-09 RX ADMIN — SODIUM CHLORIDE 20 ML/HR: 9 INJECTION, SOLUTION INTRAVENOUS at 10:23

## 2020-09-09 RX ADMIN — CARBOPLATIN 188 MG: 10 INJECTION, SOLUTION INTRAVENOUS at 12:53

## 2020-09-09 RX ADMIN — PACLITAXEL 90 MG: 6 INJECTION, SOLUTION, CONCENTRATE INTRAVENOUS at 11:35

## 2020-09-09 NOTE — PROGRESS NOTES
Patient escorted to infusion area after office visit with Wing Alexei MENA. Lab results reviewed with patient. Chemo administered as ordered. Tolerated without incident. Patient reminded to drink plenty of fluids.

## 2020-09-09 NOTE — PROGRESS NOTES
MA Rooming Questions  Patient: Bradley Tran  MRN: [id number]    Date: 9/9/2020        1. Do you have any new issues? yes - pain in feet, swelling in legs and feet, tired         2. Do you need any refills on medications?    no    3. Have you had any imaging done since your last visit?   no    4. Have you been hospitalized or seen in the emergency room since your last visit here?   no    5. Did the patient have a depression screening completed today?  No    No data recorded     PHQ-9 Given to (if applicable):               PHQ-9 Score (if applicable):                     [] Positive     []  Negative              Does question #9 need addressed (if applicable)                     [] Yes    []  No               Maurice Rodriguez MA

## 2020-09-09 NOTE — PROGRESS NOTES
Ambulated to infusion area. Here for chemo. Labs drawn.  Patient escorted to front clinic for office visit with Francesca Phan NP.

## 2020-09-10 ENCOUNTER — HOSPITAL ENCOUNTER (OUTPATIENT)
Dept: RADIATION ONCOLOGY | Age: 65
Discharge: HOME | End: 2020-09-10
Attending: RADIOLOGY
Payer: COMMERCIAL

## 2020-09-10 PROCEDURE — 77386 HC NTSTY MODUL RAD TX DLVR CPLX: CPT | Performed by: RADIOLOGY

## 2020-09-10 PROCEDURE — 77014 PR CT GUIDANCE PLACEMENT RAD THERAPY FIELDS: CPT | Performed by: RADIOLOGY

## 2020-09-11 ENCOUNTER — HOSPITAL ENCOUNTER (OUTPATIENT)
Dept: RADIATION ONCOLOGY | Age: 65
Discharge: HOME | End: 2020-09-11
Attending: RADIOLOGY
Payer: COMMERCIAL

## 2020-09-11 PROCEDURE — 77014 PR CT GUIDANCE PLACEMENT RAD THERAPY FIELDS: CPT | Performed by: RADIOLOGY

## 2020-09-11 PROCEDURE — 77386 HC NTSTY MODUL RAD TX DLVR CPLX: CPT | Performed by: RADIOLOGY

## 2020-09-14 ENCOUNTER — HOSPITAL ENCOUNTER (OUTPATIENT)
Dept: RADIATION ONCOLOGY | Age: 65
Discharge: HOME | End: 2020-09-14
Attending: RADIOLOGY
Payer: COMMERCIAL

## 2020-09-14 VITALS — BODY MASS INDEX: 25.43 KG/M2 | WEIGHT: 172.2 LBS

## 2020-09-14 PROCEDURE — 77386 HC NTSTY MODUL RAD TX DLVR CPLX: CPT | Performed by: RADIOLOGY

## 2020-09-14 PROCEDURE — 77014 PR CT GUIDANCE PLACEMENT RAD THERAPY FIELDS: CPT | Performed by: RADIOLOGY

## 2020-09-14 ASSESSMENT — PAIN SCALES - GENERAL: PAINLEVEL_OUTOF10: 0

## 2020-09-14 NOTE — PROGRESS NOTES
Weekly Radiation Treatment Progress Note    DATE OF SERVICE: 9/14/2020     DIAGNOSIS:  Cancer Staging  Primary malignant neoplasm of bronchus of right middle lobe [facility])  Staging form: Lung, AJCC 8th Edition  - Clinical: Stage IV (pM1) - Signed by Joanna Monsalve MD on 7/6/2020       TREATMENT COURSE:   Oncology History   Secondary malignant neoplasm of brain ([facility] [address]   6/13/2020 Initial Diagnosis    Secondary malignant neoplasm of brain ([facility] [address]     6/13/2020 Surgery    Resection left parietal metastasis           Site: Right Lung  Current Radiation Dose: 3800 cGy    Subjective:    Doing well  Started having mild burning in right chest - does not want any meds    EXAM  There were no vitals filed for this visit.   NAD    Setup images, chart, plan reviewed    A/P:   Tolerating tx well  Continue RT as planned      Electronically signed by Alida Guillen MD on 9/14/2020 at 9:12 AM

## 2020-09-15 ENCOUNTER — HOSPITAL ENCOUNTER (OUTPATIENT)
Dept: RADIATION ONCOLOGY | Age: 65
Discharge: HOME | End: 2020-09-15
Attending: RADIOLOGY
Payer: COMMERCIAL

## 2020-09-15 PROCEDURE — 77014 PR CT GUIDANCE PLACEMENT RAD THERAPY FIELDS: CPT | Performed by: RADIOLOGY

## 2020-09-15 PROCEDURE — 77336 RADIATION PHYSICS CONSULT: CPT | Performed by: RADIOLOGY

## 2020-09-15 PROCEDURE — 77386 HC NTSTY MODUL RAD TX DLVR CPLX: CPT | Performed by: RADIOLOGY

## 2020-09-15 PROCEDURE — 77427 RADIATION TX MANAGEMENT X5: CPT | Performed by: RADIOLOGY

## 2020-09-16 ENCOUNTER — HOSPITAL ENCOUNTER (OUTPATIENT)
Dept: INFUSION THERAPY | Age: 65
Discharge: HOME | End: 2020-09-16
Payer: COMMERCIAL

## 2020-09-16 ENCOUNTER — HOSPITAL ENCOUNTER (OUTPATIENT)
Dept: RADIATION ONCOLOGY | Age: 65
Discharge: HOME | End: 2020-09-16
Attending: RADIOLOGY
Payer: COMMERCIAL

## 2020-09-16 VITALS
RESPIRATION RATE: 16 BRPM | WEIGHT: 172 LBS | TEMPERATURE: 97.8 F | OXYGEN SATURATION: 96 % | HEART RATE: 117 BPM | BODY MASS INDEX: 25.48 KG/M2 | DIASTOLIC BLOOD PRESSURE: 69 MMHG | HEIGHT: 69 IN | SYSTOLIC BLOOD PRESSURE: 102 MMHG

## 2020-09-16 DIAGNOSIS — C79.31 SECONDARY MALIGNANT NEOPLASM OF BRAIN (HCC): ICD-10-CM

## 2020-09-16 DIAGNOSIS — C34.2 PRIMARY MALIGNANT NEOPLASM OF BRONCHUS OF RIGHT MIDDLE LOBE (HCC): Primary | ICD-10-CM

## 2020-09-16 LAB
ALBUMIN SERPL-MCNC: 3.5 GM/DL (ref 3.4–5)
ALP BLD-CCNC: 171 IU/L (ref 40–128)
ALT SERPL-CCNC: 9 U/L (ref 10–40)
ANION GAP SERPL CALCULATED.3IONS-SCNC: 14 MMOL/L (ref 4–16)
AST SERPL-CCNC: 17 IU/L (ref 15–37)
BASOPHILS ABSOLUTE: 0 K/CU MM
BASOPHILS RELATIVE PERCENT: 0.2 % (ref 0–1)
BILIRUB SERPL-MCNC: 0.7 MG/DL (ref 0–1)
BUN BLDV-MCNC: 12 MG/DL (ref 6–23)
CALCIUM SERPL-MCNC: 9.3 MG/DL (ref 8.3–10.6)
CHLORIDE BLD-SCNC: 87 MMOL/L (ref 99–110)
CO2: 30 MMOL/L (ref 21–32)
CREAT SERPL-MCNC: 1.1 MG/DL (ref 0.9–1.3)
DIFFERENTIAL TYPE: ABNORMAL
EOSINOPHILS ABSOLUTE: 0 K/CU MM
EOSINOPHILS RELATIVE PERCENT: 0 % (ref 0–3)
GFR AFRICAN AMERICAN: >60 ML/MIN/1.73M2
GFR AFRICAN AMERICAN: >60 ML/MIN/1.73M2
GFR NON-AFRICAN AMERICAN: >60 ML/MIN/1.73M2
GFR NON-AFRICAN AMERICAN: >60 ML/MIN/1.73M2
GLUCOSE BLD-MCNC: 105 MG/DL (ref 70–99)
GLUCOSE BLD-MCNC: 111 MG/DL (ref 70–99)
HCT VFR BLD CALC: 38.3 % (ref 42–52)
HEMOGLOBIN: 12.9 GM/DL (ref 13.5–18)
LYMPHOCYTES ABSOLUTE: 0.2 K/CU MM
LYMPHOCYTES RELATIVE PERCENT: 4.1 % (ref 24–44)
MCH RBC QN AUTO: 28 PG (ref 27–31)
MCHC RBC AUTO-ENTMCNC: 33.7 % (ref 32–36)
MCV RBC AUTO: 83.3 FL (ref 78–100)
MONOCYTES ABSOLUTE: 0.7 K/CU MM
MONOCYTES RELATIVE PERCENT: 12.5 % (ref 0–4)
PDW BLD-RTO: 15.4 % (ref 11.7–14.9)
PLATELET # BLD: 202 K/CU MM (ref 140–440)
PMV BLD AUTO: 9.3 FL (ref 7.5–11.1)
POC CALCIUM: 1.04 MMOL/L (ref 1.12–1.32)
POC CHLORIDE: 91 MMOL/L (ref 98–109)
POC CREATININE: 1.1 MG/DL (ref 0.9–1.3)
POTASSIUM SERPL-SCNC: 3.5 MMOL/L (ref 3.5–4.5)
POTASSIUM SERPL-SCNC: 3.6 MMOL/L (ref 3.5–5.1)
RBC # BLD: 4.6 M/CU MM (ref 4.6–6.2)
SEGMENTED NEUTROPHILS ABSOLUTE COUNT: 4.5 K/CU MM
SEGMENTED NEUTROPHILS RELATIVE PERCENT: 83.2 % (ref 36–66)
SODIUM BLD-SCNC: 130 MMOL/L (ref 138–146)
SODIUM BLD-SCNC: 131 MMOL/L (ref 135–145)
SOURCE, BLOOD GAS: ABNORMAL
TOTAL PROTEIN: 6.5 GM/DL (ref 6.4–8.2)
WBC # BLD: 5.4 K/CU MM (ref 4–10.5)

## 2020-09-16 PROCEDURE — 96413 CHEMO IV INFUSION 1 HR: CPT

## 2020-09-16 PROCEDURE — 96374 THER/PROPH/DIAG INJ IV PUSH: CPT

## 2020-09-16 PROCEDURE — 80053 COMPREHEN METABOLIC PANEL: CPT

## 2020-09-16 PROCEDURE — 2580000003 HC RX 258: Performed by: INTERNAL MEDICINE

## 2020-09-16 PROCEDURE — 77386 HC NTSTY MODUL RAD TX DLVR CPLX: CPT | Performed by: RADIOLOGY

## 2020-09-16 PROCEDURE — 96417 CHEMO IV INFUS EACH ADDL SEQ: CPT

## 2020-09-16 PROCEDURE — 85025 COMPLETE CBC W/AUTO DIFF WBC: CPT

## 2020-09-16 PROCEDURE — 77014 PR CT GUIDANCE PLACEMENT RAD THERAPY FIELDS: CPT | Performed by: RADIOLOGY

## 2020-09-16 PROCEDURE — 96375 TX/PRO/DX INJ NEW DRUG ADDON: CPT

## 2020-09-16 PROCEDURE — 6360000002 HC RX W HCPCS: Performed by: INTERNAL MEDICINE

## 2020-09-16 PROCEDURE — 2500000003 HC RX 250 WO HCPCS: Performed by: INTERNAL MEDICINE

## 2020-09-16 RX ORDER — DIPHENHYDRAMINE HYDROCHLORIDE 50 MG/ML
50 INJECTION INTRAMUSCULAR; INTRAVENOUS ONCE
Status: CANCELLED | OUTPATIENT
Start: 2020-09-16

## 2020-09-16 RX ORDER — SODIUM CHLORIDE 9 MG/ML
INJECTION, SOLUTION INTRAVENOUS CONTINUOUS
Status: CANCELLED | OUTPATIENT
Start: 2020-09-30

## 2020-09-16 RX ORDER — SODIUM CHLORIDE 9 MG/ML
20 INJECTION, SOLUTION INTRAVENOUS ONCE
Status: CANCELLED | OUTPATIENT
Start: 2020-09-30

## 2020-09-16 RX ORDER — DIPHENHYDRAMINE HYDROCHLORIDE 50 MG/ML
50 INJECTION INTRAMUSCULAR; INTRAVENOUS ONCE
Status: COMPLETED | OUTPATIENT
Start: 2020-09-16 | End: 2020-09-16

## 2020-09-16 RX ORDER — MEPERIDINE HYDROCHLORIDE 50 MG/ML
12.5 INJECTION INTRAMUSCULAR; INTRAVENOUS; SUBCUTANEOUS ONCE
Status: CANCELLED | OUTPATIENT
Start: 2020-09-16

## 2020-09-16 RX ORDER — METHYLPREDNISOLONE SODIUM SUCCINATE 125 MG/2ML
125 INJECTION, POWDER, LYOPHILIZED, FOR SOLUTION INTRAMUSCULAR; INTRAVENOUS ONCE
Status: CANCELLED | OUTPATIENT
Start: 2020-09-30

## 2020-09-16 RX ORDER — SODIUM CHLORIDE 0.9 % (FLUSH) 0.9 %
10 SYRINGE (ML) INJECTION PRN
Status: DISCONTINUED | OUTPATIENT
Start: 2020-09-16 | End: 2020-09-17 | Stop reason: HOSPADM

## 2020-09-16 RX ORDER — SODIUM CHLORIDE 0.9 % (FLUSH) 0.9 %
10 SYRINGE (ML) INJECTION PRN
Status: CANCELLED | OUTPATIENT
Start: 2020-09-16

## 2020-09-16 RX ORDER — HEPARIN SODIUM (PORCINE) LOCK FLUSH IV SOLN 100 UNIT/ML 100 UNIT/ML
500 SOLUTION INTRAVENOUS PRN
Status: DISCONTINUED | OUTPATIENT
Start: 2020-09-16 | End: 2020-09-17 | Stop reason: HOSPADM

## 2020-09-16 RX ORDER — PALONOSETRON 0.05 MG/ML
0.25 INJECTION, SOLUTION INTRAVENOUS ONCE
Status: CANCELLED | OUTPATIENT
Start: 2020-09-30

## 2020-09-16 RX ORDER — SODIUM CHLORIDE 9 MG/ML
20 INJECTION, SOLUTION INTRAVENOUS ONCE
Status: DISCONTINUED | OUTPATIENT
Start: 2020-09-16 | End: 2020-09-17 | Stop reason: HOSPADM

## 2020-09-16 RX ORDER — DIPHENHYDRAMINE HYDROCHLORIDE 50 MG/ML
50 INJECTION INTRAMUSCULAR; INTRAVENOUS ONCE
Status: CANCELLED | OUTPATIENT
Start: 2020-09-23

## 2020-09-16 RX ORDER — HEPARIN SODIUM (PORCINE) LOCK FLUSH IV SOLN 100 UNIT/ML 100 UNIT/ML
500 SOLUTION INTRAVENOUS PRN
Status: CANCELLED | OUTPATIENT
Start: 2020-09-23

## 2020-09-16 RX ORDER — SODIUM CHLORIDE 9 MG/ML
20 INJECTION, SOLUTION INTRAVENOUS ONCE
Status: CANCELLED | OUTPATIENT
Start: 2020-09-16

## 2020-09-16 RX ORDER — EPINEPHRINE 1 MG/ML
0.3 INJECTION, SOLUTION, CONCENTRATE INTRAVENOUS PRN
Status: CANCELLED | OUTPATIENT
Start: 2020-09-30

## 2020-09-16 RX ORDER — SODIUM CHLORIDE 0.9 % (FLUSH) 0.9 %
5 SYRINGE (ML) INJECTION PRN
Status: CANCELLED | OUTPATIENT
Start: 2020-09-30

## 2020-09-16 RX ORDER — PALONOSETRON 0.05 MG/ML
0.25 INJECTION, SOLUTION INTRAVENOUS ONCE
Status: COMPLETED | OUTPATIENT
Start: 2020-09-16 | End: 2020-09-16

## 2020-09-16 RX ORDER — EPINEPHRINE 1 MG/ML
0.3 INJECTION, SOLUTION, CONCENTRATE INTRAVENOUS PRN
Status: CANCELLED | OUTPATIENT
Start: 2020-09-16

## 2020-09-16 RX ORDER — PALONOSETRON 0.05 MG/ML
0.25 INJECTION, SOLUTION INTRAVENOUS ONCE
Status: CANCELLED | OUTPATIENT
Start: 2020-09-16

## 2020-09-16 RX ORDER — MEPERIDINE HYDROCHLORIDE 50 MG/ML
12.5 INJECTION INTRAMUSCULAR; INTRAVENOUS; SUBCUTANEOUS ONCE
Status: CANCELLED | OUTPATIENT
Start: 2020-09-30

## 2020-09-16 RX ORDER — SODIUM CHLORIDE 0.9 % (FLUSH) 0.9 %
10 SYRINGE (ML) INJECTION PRN
Status: CANCELLED | OUTPATIENT
Start: 2020-09-23

## 2020-09-16 RX ORDER — SODIUM CHLORIDE 0.9 % (FLUSH) 0.9 %
5 SYRINGE (ML) INJECTION PRN
Status: CANCELLED | OUTPATIENT
Start: 2020-09-23

## 2020-09-16 RX ORDER — HEPARIN SODIUM (PORCINE) LOCK FLUSH IV SOLN 100 UNIT/ML 100 UNIT/ML
500 SOLUTION INTRAVENOUS PRN
Status: CANCELLED | OUTPATIENT
Start: 2020-09-16

## 2020-09-16 RX ORDER — DIPHENHYDRAMINE HYDROCHLORIDE 50 MG/ML
50 INJECTION INTRAMUSCULAR; INTRAVENOUS ONCE
Status: CANCELLED | OUTPATIENT
Start: 2020-09-30

## 2020-09-16 RX ORDER — MEPERIDINE HYDROCHLORIDE 50 MG/ML
12.5 INJECTION INTRAMUSCULAR; INTRAVENOUS; SUBCUTANEOUS ONCE
Status: CANCELLED | OUTPATIENT
Start: 2020-09-23

## 2020-09-16 RX ORDER — METHYLPREDNISOLONE SODIUM SUCCINATE 125 MG/2ML
125 INJECTION, POWDER, LYOPHILIZED, FOR SOLUTION INTRAMUSCULAR; INTRAVENOUS ONCE
Status: CANCELLED | OUTPATIENT
Start: 2020-09-23

## 2020-09-16 RX ORDER — PALONOSETRON 0.05 MG/ML
0.25 INJECTION, SOLUTION INTRAVENOUS ONCE
Status: CANCELLED | OUTPATIENT
Start: 2020-09-23

## 2020-09-16 RX ORDER — EPINEPHRINE 1 MG/ML
0.3 INJECTION, SOLUTION, CONCENTRATE INTRAVENOUS PRN
Status: CANCELLED | OUTPATIENT
Start: 2020-09-23

## 2020-09-16 RX ORDER — SODIUM CHLORIDE 0.9 % (FLUSH) 0.9 %
5 SYRINGE (ML) INJECTION PRN
Status: CANCELLED | OUTPATIENT
Start: 2020-09-16

## 2020-09-16 RX ORDER — SODIUM CHLORIDE 9 MG/ML
20 INJECTION, SOLUTION INTRAVENOUS ONCE
Status: CANCELLED | OUTPATIENT
Start: 2020-09-23

## 2020-09-16 RX ORDER — METHYLPREDNISOLONE SODIUM SUCCINATE 125 MG/2ML
125 INJECTION, POWDER, LYOPHILIZED, FOR SOLUTION INTRAMUSCULAR; INTRAVENOUS ONCE
Status: CANCELLED | OUTPATIENT
Start: 2020-09-16

## 2020-09-16 RX ORDER — SODIUM CHLORIDE 0.9 % (FLUSH) 0.9 %
10 SYRINGE (ML) INJECTION PRN
Status: CANCELLED | OUTPATIENT
Start: 2020-09-30

## 2020-09-16 RX ORDER — HEPARIN SODIUM (PORCINE) LOCK FLUSH IV SOLN 100 UNIT/ML 100 UNIT/ML
500 SOLUTION INTRAVENOUS PRN
Status: CANCELLED | OUTPATIENT
Start: 2020-09-30

## 2020-09-16 RX ORDER — SODIUM CHLORIDE 9 MG/ML
INJECTION, SOLUTION INTRAVENOUS CONTINUOUS
Status: CANCELLED | OUTPATIENT
Start: 2020-09-16

## 2020-09-16 RX ORDER — SODIUM CHLORIDE 9 MG/ML
INJECTION, SOLUTION INTRAVENOUS CONTINUOUS
Status: CANCELLED | OUTPATIENT
Start: 2020-09-23

## 2020-09-16 RX ADMIN — CARBOPLATIN 200 MG: 10 INJECTION, SOLUTION INTRAVENOUS at 12:22

## 2020-09-16 RX ADMIN — DEXAMETHASONE SODIUM PHOSPHATE 10 MG: 10 INJECTION INTRAMUSCULAR; INTRAVENOUS at 10:45

## 2020-09-16 RX ADMIN — SODIUM CHLORIDE 20 ML/HR: 9 INJECTION, SOLUTION INTRAVENOUS at 10:45

## 2020-09-16 RX ADMIN — ALTEPLASE 2 MG: 2.2 INJECTION, POWDER, LYOPHILIZED, FOR SOLUTION INTRAVENOUS at 09:46

## 2020-09-16 RX ADMIN — Medication 500 UNITS: at 13:04

## 2020-09-16 RX ADMIN — SODIUM CHLORIDE, PRESERVATIVE FREE 10 ML: 5 INJECTION INTRAVENOUS at 13:04

## 2020-09-16 RX ADMIN — DIPHENHYDRAMINE HYDROCHLORIDE 50 MG: 50 INJECTION INTRAMUSCULAR; INTRAVENOUS at 10:38

## 2020-09-16 RX ADMIN — FAMOTIDINE 20 MG: 10 INJECTION, SOLUTION INTRAVENOUS at 10:38

## 2020-09-16 RX ADMIN — PALONOSETRON 0.25 MG: 0.25 INJECTION, SOLUTION INTRAVENOUS at 10:38

## 2020-09-16 RX ADMIN — PACLITAXEL 90 MG: 6 INJECTION, SOLUTION INTRAVENOUS at 11:10

## 2020-09-17 ENCOUNTER — HOSPITAL ENCOUNTER (OUTPATIENT)
Dept: RADIATION ONCOLOGY | Age: 65
Discharge: HOME | End: 2020-09-17
Attending: RADIOLOGY
Payer: COMMERCIAL

## 2020-09-17 PROCEDURE — 77014 PR CT GUIDANCE PLACEMENT RAD THERAPY FIELDS: CPT | Performed by: RADIOLOGY

## 2020-09-17 PROCEDURE — 77386 HC NTSTY MODUL RAD TX DLVR CPLX: CPT | Performed by: RADIOLOGY

## 2020-09-17 PROCEDURE — 99999 PR OFFICE/OUTPT VISIT,PROCEDURE ONLY: CPT | Performed by: RADIOLOGY

## 2020-09-18 ENCOUNTER — HOSPITAL ENCOUNTER (OUTPATIENT)
Dept: RADIATION ONCOLOGY | Age: 65
Discharge: HOME | End: 2020-09-18
Attending: RADIOLOGY
Payer: COMMERCIAL

## 2020-09-18 PROCEDURE — 77014 PR CT GUIDANCE PLACEMENT RAD THERAPY FIELDS: CPT | Performed by: RADIOLOGY

## 2020-09-18 PROCEDURE — 77386 HC NTSTY MODUL RAD TX DLVR CPLX: CPT | Performed by: RADIOLOGY

## 2020-09-20 NOTE — PROGRESS NOTES
Patient Name: Lisbet Banegas  Patient : 1955  Patient MRN: [id number]     Primary Oncologist: Mayte Collins MD  Referring Provider: Samir Villa MD     Date of Service: 2020      Chief Complaint:   Chief Complaint   Patient presents with    Follow-up     Patient Active Problem List:     Secondary malignant neoplasm of brain      Primary malignant neoplasm of bronchus of right middle lobe      Adenocarcinoma of lung, stage 4, right      HPI:   Pina De Paz is a 79-year-old very pleasant gentleman with medical history significant for HIV/AIDS, COPD and GERD referred to me on 2020 for evaluation of his metastatic lung cancer. He initially presented to [facility] with seizure on 2020. CT scan of the head showed vasogenic edema in left parietal lobe concerning for underlying mass. CT scan of the chest showed postobstructive collapse/consolidation of the right middle lobe, endobronchial lesion cannot be excluded and 16 mm left upper lobe nodule. He was then transferred to [facility] ED for further workup and management. MRI of the brain with and without contrast done on 20 showed 1.2 cm mass in left parietal lobe with surrounding vasogenic edema, regional mass effect and effacement of atrium of left lateral ventricle. He underwent bronchoscopy, EBUS on 6/15/20 and it showed collapse of RML lateral segment bronchus. Final pathology from EBUS guided biopsy of subcarinal LN was consistent with  lung adenocarcinoma. PD-L1 was positive (95%). He then underwent resection of the left parietal lobe mass on 20 and final pathology was consistent with adenocarcinoma of lung primary. Molecular testing revealed that MET amplification was detected. MRI of the brain done on 20 showed postsurgical changes related to mass resection in the left parietal lobe.  There is small rim of contrast enhancement along the resection cavity margin which may be treatment related, and attention on follow-up imaging is recommended. Surrounding vasogenic edema and mass effect have improved. New tiny focus of restricted diffusion in the left occipital lobe which could be a recent infarct (acute to subacute). No hemorrhage or mass effect associated with this. He was subsequently referred to me for further evaluation. He was seen by radiation oncologist at [facility] and he received SBRT to his brain surgical site between 7/15 and 7/17/2020. PET/CT scan done on July 9, 2020 showed Intense metabolic activity associated with the masslike opacification of the right middle lobe which may represent primary lung cancer and/or postobstructive pneumonia. Metabolically active right hilar and subcarinal lymph node metastasis. Solitary metabolically active skeletal metastasis in the left aspect of the sacrum. Definitive concurrent chemoradiation therapy was started since 8/18/2020 (started RT on 8/18/20 and chemo on 8/19/20). On September 23, 2020, he presented to me for follow-up. I have been following him for metastatic non small cell lung cancer. He is s/p surgery followed by SBRT to his solitary brain metastasis. He is currently on definitive concurrent chemoradiation therapy to his lung lesions since 8/18/20, because he only has oligo metastasis. We decided to give concomitant chemoradiation therapy to his lung lesion, followed by consolidation RT to his left sacrum. After that, I will proceed with first line chemotherapy with pembrolizumab. He is tolerating concurrent chemoradiation therapy very well and he does not encounter any major side effects from it. I recommended to continue with current chemoradiation therapy and we will continue to follow him closely during chemotherapy and radiation therapy. He will most likely finish chemoradiation therapy on 9/30/20.   On today visit, he complains of having difficulty every 4 hourly and I believe it is due to radiation-induced esophagitis. I will start miracle mouthwash and I recommend him to use it every 4-6 hourly for his dysphagia. Otherwise, I will see him back in 3 weeks. At that time, I will consider to start first-line chemotherapy with Keytruda. He doesn't have any other significant symptoms at today visit. Past Medical History  Significant for  1. HIV/AIDS since 2005  2. COPD  3. GERD  4. 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 Right inguinal hernia repair   2. Tonsillectomy in 1960s  3. Left wrist fracture surgery in 2/28/2020  4. Brain metastasis removal on June 2020    Allergies  No known medication allergies    Social History  He is a former smoker and he quit smoking in 2005. He used to smoke 1 pack per day for about 32 years. He is currently living in [facility]. Family History  Significant for breast cancer in his mother, melanoma in his maternal uncle, lung cancer in his maternal grandmother and leukemia in his maternal grandfather. Review of Systems: \"Per interval history; otherwise 10 point ROS is negative. \"  His energy level is low, appetite, and sleep are stable. Denies fever, chills, night sweats, cough, shortness of breath, chest pain, hemoptysis, or palpitations. His bowel and bladder functions are normal except he has dysphagia. Denies nausea, vomiting, abdominal pain, diarrhea, constipation, dysuria, loss of appetite, or weight loss. He denies neuropathy and he does not have bleeding or clotting issues. He denies any pain in his body. Denies anxiety or depression. The rest of the systems are unremarkable.     Vital Signs:  /66 (Position: Sitting, Cuff Size: Medium Adult)   Pulse 115   Temp 97.3 °F (36.3 °C) (Infrared)   Resp 16   Ht 5' 9\" (1.753 m)   Wt 162 lb 9.6 oz (73.8 kg)   BMI 24.01 kg/m²     Physical Exam:  CONSTITUTIONAL: awake, alert, cooperative, no apparent distress   EYES: pupils equal, round and reactive to light, sclera clear and conjunctiva normal  ENT: Normocephalic, without obvious abnormality, atraumatic  NECK: supple, symmetrical, no jugular venous distension and no carotid bruits   HEMATOLOGIC/LYMPHATIC: no cervical, supraclavicular or axillary lymphadenopathy   LUNGS: VBS, no crackles, no rhonchi, no wheezes, no increased work of breathing and clear to auscultation   CARDIOVASCULAR: regular rate and rhythm, normal S1 and S2, no murmur noted  ABDOMEN: normal bowel sounds x 4, soft, non-distended, non-tender, no masses palpated, no hepatosplenomegaly   MUSCULOSKELETAL: full range of motion noted, tone is normal  NEUROLOGIC: awake, alert, oriented to name, place and time. Motor skills grossly intact. SKIN: Normal skin color, texture, turgor and no jaundice.  appears intact   EXTREMITIES: no LE edema, no clubbing, no leg swelling, no cyanosis     Labs:  Hematology:  Lab Results   Component Value Date    WBC 4.9 09/23/2020    RBC 4.40 (L) 09/23/2020    HGB 12.5 (L) 09/23/2020    HCT 36.8 (L) 09/23/2020    MCV 83.6 09/23/2020    MCH 28.4 09/23/2020    MCHC 34.0 09/23/2020    RDW 16.2 (H) 09/23/2020     09/23/2020    MPV 8.4 09/23/2020    SEGSPCT 81.9 (H) 09/23/2020    EOSRELPCT 0.2 09/23/2020    BASOPCT 0.2 09/23/2020    LYMPHOPCT 4.7 (L) 09/23/2020    MONOPCT 13.0 (H) 09/23/2020    SEGSABS 4.1 09/23/2020    EOSABS 0.0 09/23/2020    BASOSABS 0.0 09/23/2020    LYMPHSABS 0.2 09/23/2020    MONOSABS 0.6 09/23/2020    DIFFTYPE AUTOMATED DIFFERENTIAL 09/23/2020     No results found for: ESR  Chemistry:  Lab Results   Component Value Date     (L) 09/23/2020    K 2.9 (L) 09/23/2020    CL 86 (L) 09/23/2020    CO2 32 09/23/2020    BUN 13 09/23/2020    CREATININE 1.1 09/23/2020    GLUCOSE 113 (H) 09/23/2020    CALCIUM 9.1 09/23/2020    PROT 6.6 09/23/2020    LABALBU 3.7 09/23/2020    BILITOT 0.8 09/23/2020    ALKPHOS 150 (H) 09/23/2020    AST 16 09/23/2020    ALT 8 (L) 09/23/2020    LABGLOM >60 09/23/2020    GFRAA >60 09/23/2020 AGRATIO 0 06/22/2020    GLOB 0 06/22/2020    PHOS 3.7 08/31/2016    MG 2.5 (H) 06/13/2020    POCCA 1.07 (L) 09/23/2020    POCGLU 117 (H) 09/23/2020     No results found for: MMA, LDH, HOMOCYSTEINE  No components found for: LD  Lab Results   Component Value Date    TSHHS 1.720 05/09/2017    T4FREE 1.47 08/29/2016     Immunology:  Lab Results   Component Value Date    PROT 6.6 09/23/2020     No results found for: Alfonso Abdi, KADIELCR  No results found for: B2M  Coagulation Panel:  Lab Results   Component Value Date    PROTIME 11.0 04/23/2013    INR 1.01 04/23/2013    APTT 29.1 06/05/2012    DDIMER 297 (H) 09/20/2015     Anemia Panel:  Lab Results   Component Value Date    [id number] 388.0 08/29/2016    FOLATE 8.8 08/29/2016     Tumor Markers:  Lab Results   Component Value Date    CEA 3.4 08/30/2016     17.7 08/30/2016    PSA 2.03 03/13/2013     Observations:  No data recorded      Assessment & Plan:   Metastatic non-small cell lung cancer - adenocarcinoma  Brain metastasis - s/p excision    PLAN  Clark Franklin is a 78-year-old very pleasant gentleman who was found to have right middle lobe lung mass with left parietal lobe brain metastasis when he presented with seizure on June 13, 2020. Further work ups with bronchoscopy, EBUS on 6/15/20 showed collapse of RML lateral segment bronchus. Final pathology from EBUS guided biopsy of subcarinal lymph node was consistent with  lung adenocarcinoma. PD-L1 was positive (95%). He was subsequently referred to me for further evaluation. He was seen by radiation oncologist at [facility] and he received SBRT to his brain surgical site between 7/15 and 7/17/2020. PET/CT scan done on July 9, 2020 showed Intense metabolic activity associated with the masslike opacification of the right middle lobe which may represent primary lung cancer and/or postobstructive pneumonia. Metabolically active right hilar and subcarinal lymph node metastasis.  Solitary metabolically

## 2020-09-21 ENCOUNTER — HOSPITAL ENCOUNTER (OUTPATIENT)
Dept: RADIATION ONCOLOGY | Age: 65
Discharge: HOME | End: 2020-09-21
Attending: RADIOLOGY
Payer: COMMERCIAL

## 2020-09-21 VITALS — WEIGHT: 172 LBS | BODY MASS INDEX: 25.4 KG/M2

## 2020-09-21 PROBLEM — C79.51 SECONDARY MALIGNANT NEOPLASM OF BONE AND BONE MARROW (HCC): Status: ACTIVE | Noted: 2020-09-21

## 2020-09-21 PROBLEM — C79.52 SECONDARY MALIGNANT NEOPLASM OF BONE AND BONE MARROW (HCC): Status: ACTIVE | Noted: 2020-09-21

## 2020-09-21 LAB
A/G RATIO: 1.2
ALBUMIN SERPL-MCNC: 3.4 G/DL
ALP BLD-CCNC: 152 U/L
ALT SERPL-CCNC: 9 U/L
AST SERPL-CCNC: 15 U/L
BASOPHILS ABSOLUTE: 0 /ΜL
BASOPHILS RELATIVE PERCENT: 0.8 %
BILIRUB SERPL-MCNC: 1 MG/DL (ref 0.1–1.4)
BILIRUBIN DIRECT: 0.3 MG/DL
BILIRUBIN, INDIRECT: 0.7
BUN BLDV-MCNC: 14 MG/DL
CALCIUM SERPL-MCNC: 9.3 MG/DL
CHLORIDE BLD-SCNC: 85 MMOL/L
CO2: 28 MMOL/L
CREAT SERPL-MCNC: 1.1 MG/DL
EOSINOPHILS ABSOLUTE: 0 /ΜL
EOSINOPHILS RELATIVE PERCENT: 0.1 %
GFR CALCULATED: 71
GLOBULIN: 2.9
GLUCOSE BLD-MCNC: 111 MG/DL
HCT VFR BLD CALC: 38.8 % (ref 41–53)
HEMOGLOBIN: 13 G/DL (ref 13.5–17.5)
LYMPHOCYTES ABSOLUTE: 0.2 /ΜL
LYMPHOCYTES RELATIVE PERCENT: 4.5 %
MCH RBC QN AUTO: 28.6 PG
MCHC RBC AUTO-ENTMCNC: 33.6 G/DL
MCV RBC AUTO: 85.2 FL
MONOCYTES ABSOLUTE: 0.4 /ΜL
MONOCYTES RELATIVE PERCENT: 7.1 %
NEUTROPHILS ABSOLUTE: 4.7 /ΜL
NEUTROPHILS RELATIVE PERCENT: 87.5 %
PDW BLD-RTO: 15.8 %
PLATELET # BLD: 246 K/ΜL
PMV BLD AUTO: 0 FL
POTASSIUM SERPL-SCNC: 3.3 MMOL/L
PROTEIN TOTAL: 6.3 G/DL
RBC # BLD: 4.56 10^6/ΜL
SODIUM BLD-SCNC: 129 MMOL/L
WBC # BLD: 5.4 10^3/ML

## 2020-09-21 PROCEDURE — 77014 PR CT GUIDANCE PLACEMENT RAD THERAPY FIELDS: CPT | Performed by: RADIOLOGY

## 2020-09-21 PROCEDURE — 77386 HC NTSTY MODUL RAD TX DLVR CPLX: CPT | Performed by: RADIOLOGY

## 2020-09-21 ASSESSMENT — PAIN - FUNCTIONAL ASSESSMENT: PAIN_FUNCTIONAL_ASSESSMENT: PREVENTS OR INTERFERES SOME ACTIVE ACTIVITIES AND ADLS

## 2020-09-21 ASSESSMENT — PAIN DESCRIPTION - PROGRESSION: CLINICAL_PROGRESSION: NOT CHANGED

## 2020-09-21 ASSESSMENT — PAIN DESCRIPTION - PAIN TYPE: TYPE: CHRONIC PAIN

## 2020-09-21 ASSESSMENT — PAIN DESCRIPTION - DESCRIPTORS: DESCRIPTORS: ACHING;TIGHTNESS

## 2020-09-21 ASSESSMENT — PAIN DESCRIPTION - FREQUENCY: FREQUENCY: CONTINUOUS

## 2020-09-21 ASSESSMENT — PAIN DESCRIPTION - ORIENTATION: ORIENTATION: LEFT;RIGHT

## 2020-09-21 ASSESSMENT — PAIN DESCRIPTION - LOCATION: LOCATION: FOOT

## 2020-09-21 ASSESSMENT — PAIN SCALES - GENERAL: PAINLEVEL_OUTOF10: 3

## 2020-09-21 ASSESSMENT — PAIN DESCRIPTION - ONSET: ONSET: ON-GOING

## 2020-09-21 ASSESSMENT — PAIN DESCRIPTION - DIRECTION: RADIATING_TOWARDS: EDEMA

## 2020-09-21 NOTE — PROGRESS NOTES
Weekly Radiation Treatment Progress Note    DATE OF SERVICE: 9/21/2020     DIAGNOSIS:  Cancer Staging  Primary malignant neoplasm of bronchus of right middle lobe [facility])  Staging form: Lung, AJCC 8th Edition  - Clinical: Stage IV (pM1) - Signed by Viky Benson MD on 7/6/2020       TREATMENT COURSE:   Oncology History   Secondary malignant neoplasm of brain ([facility] [address]   6/13/2020 Initial Diagnosis    Secondary malignant neoplasm of brain ([facility] [address]     6/13/2020 Surgery    Resection left parietal metastasis           Site: R Chest/Mediastinum   Current Total Radiation Dose: 48Gy    Pt doing well. Energy fairly good. No skin itching/soreness. Slightly worse AMBROSIO lately. No fever or green phlegm. Mild dysphagia +/-.  No significant odynophagia      EXAM  Wt Readings from Last 3 Encounters:   09/21/20 172 lb (78 kg)   09/16/20 172 lb (78 kg)   09/14/20 172 lb 3.2 oz (78.1 kg)     NAD  L: BCTA    Setup images, chart, plan reviewed    A/P:   Tolerating RT well  Continue RT as planned  Will reassess sacral met after completion of chest RT      Electronically signed by Viky Benson MD on 9/21/2020 at 9:36 AM

## 2020-09-21 NOTE — PLAN OF CARE
Care plan reviewed. _ Pt reports still having productive cough with thick clear phlegm. Denies fever. Pt states coughing normal for him. No resp distress noted. _ Pt c/o pain in both feet due to swelling. States swelling improved slightly today.  -Pt reports moderate fatigue, reports rested over weekend and periodically throughout the day.

## 2020-09-22 ENCOUNTER — HOSPITAL ENCOUNTER (OUTPATIENT)
Dept: RADIATION ONCOLOGY | Age: 65
Discharge: HOME | End: 2020-09-22
Attending: RADIOLOGY
Payer: COMMERCIAL

## 2020-09-22 PROCEDURE — 77386 HC NTSTY MODUL RAD TX DLVR CPLX: CPT | Performed by: RADIOLOGY

## 2020-09-22 PROCEDURE — 77336 RADIATION PHYSICS CONSULT: CPT | Performed by: RADIOLOGY

## 2020-09-22 PROCEDURE — 77014 PR CT GUIDANCE PLACEMENT RAD THERAPY FIELDS: CPT | Performed by: RADIOLOGY

## 2020-09-22 PROCEDURE — 77427 RADIATION TX MANAGEMENT X5: CPT | Performed by: RADIOLOGY

## 2020-09-23 ENCOUNTER — HOSPITAL ENCOUNTER (OUTPATIENT)
Dept: INFUSION THERAPY | Age: 65
Discharge: HOME | End: 2020-09-23
Payer: COMMERCIAL

## 2020-09-23 ENCOUNTER — OFFICE VISIT (OUTPATIENT)
Dept: ONCOLOGY | Age: 65
End: 2020-09-23
Payer: COMMERCIAL

## 2020-09-23 ENCOUNTER — HOSPITAL ENCOUNTER (OUTPATIENT)
Dept: RADIATION ONCOLOGY | Age: 65
Discharge: HOME | End: 2020-09-23
Attending: RADIOLOGY
Payer: COMMERCIAL

## 2020-09-23 ENCOUNTER — TELEPHONE (OUTPATIENT)
Dept: ONCOLOGY | Age: 65
End: 2020-09-23

## 2020-09-23 VITALS
RESPIRATION RATE: 16 BRPM | SYSTOLIC BLOOD PRESSURE: 109 MMHG | TEMPERATURE: 97.3 F | HEIGHT: 69 IN | BODY MASS INDEX: 24.08 KG/M2 | WEIGHT: 162.6 LBS | HEART RATE: 115 BPM | DIASTOLIC BLOOD PRESSURE: 66 MMHG

## 2020-09-23 VITALS
WEIGHT: 162.6 LBS | HEART RATE: 115 BPM | SYSTOLIC BLOOD PRESSURE: 109 MMHG | TEMPERATURE: 97.3 F | BODY MASS INDEX: 24.01 KG/M2 | DIASTOLIC BLOOD PRESSURE: 66 MMHG

## 2020-09-23 DIAGNOSIS — C79.31 SECONDARY MALIGNANT NEOPLASM OF BRAIN (HCC): Primary | ICD-10-CM

## 2020-09-23 DIAGNOSIS — C34.2 PRIMARY MALIGNANT NEOPLASM OF BRONCHUS OF RIGHT MIDDLE LOBE (HCC): ICD-10-CM

## 2020-09-23 LAB
ALBUMIN SERPL-MCNC: 3.7 GM/DL (ref 3.4–5)
ALP BLD-CCNC: 150 IU/L (ref 40–128)
ALT SERPL-CCNC: 8 U/L (ref 10–40)
ANION GAP SERPL CALCULATED.3IONS-SCNC: 13 MMOL/L (ref 4–16)
AST SERPL-CCNC: 16 IU/L (ref 15–37)
BASOPHILS ABSOLUTE: 0 K/CU MM
BASOPHILS RELATIVE PERCENT: 0.2 % (ref 0–1)
BILIRUB SERPL-MCNC: 0.8 MG/DL (ref 0–1)
BUN BLDV-MCNC: 13 MG/DL (ref 6–23)
CALCIUM SERPL-MCNC: 9.1 MG/DL (ref 8.3–10.6)
CHLORIDE BLD-SCNC: 86 MMOL/L (ref 99–110)
CO2: 32 MMOL/L (ref 21–32)
CREAT SERPL-MCNC: 1.1 MG/DL (ref 0.9–1.3)
DIFFERENTIAL TYPE: ABNORMAL
EOSINOPHILS ABSOLUTE: 0 K/CU MM
EOSINOPHILS RELATIVE PERCENT: 0.2 % (ref 0–3)
GFR AFRICAN AMERICAN: >60 ML/MIN/1.73M2
GFR AFRICAN AMERICAN: >60 ML/MIN/1.73M2
GFR NON-AFRICAN AMERICAN: >60 ML/MIN/1.73M2
GFR NON-AFRICAN AMERICAN: >60 ML/MIN/1.73M2
GLUCOSE BLD-MCNC: 113 MG/DL (ref 70–99)
GLUCOSE BLD-MCNC: 117 MG/DL (ref 70–99)
HCT VFR BLD CALC: 36.8 % (ref 42–52)
HEMOGLOBIN: 12.5 GM/DL (ref 13.5–18)
LYMPHOCYTES ABSOLUTE: 0.2 K/CU MM
LYMPHOCYTES RELATIVE PERCENT: 4.7 % (ref 24–44)
MCH RBC QN AUTO: 28.4 PG (ref 27–31)
MCHC RBC AUTO-ENTMCNC: 34 % (ref 32–36)
MCV RBC AUTO: 83.6 FL (ref 78–100)
MONOCYTES ABSOLUTE: 0.6 K/CU MM
MONOCYTES RELATIVE PERCENT: 13 % (ref 0–4)
PDW BLD-RTO: 16.2 % (ref 11.7–14.9)
PLATELET # BLD: 187 K/CU MM (ref 140–440)
PMV BLD AUTO: 8.4 FL (ref 7.5–11.1)
POC CALCIUM: 1.07 MMOL/L (ref 1.12–1.32)
POC CHLORIDE: 90 MMOL/L (ref 98–109)
POC CREATININE: 1.1 MG/DL (ref 0.9–1.3)
POTASSIUM SERPL-SCNC: 2.9 MMOL/L (ref 3.6–5.1)
POTASSIUM SERPL-SCNC: 3.3 MMOL/L (ref 3.5–5.1)
RBC # BLD: 4.4 M/CU MM (ref 4.6–6.2)
SEGMENTED NEUTROPHILS ABSOLUTE COUNT: 4.1 K/CU MM
SEGMENTED NEUTROPHILS RELATIVE PERCENT: 81.9 % (ref 36–66)
SODIUM BLD-SCNC: 130 MMOL/L (ref 136–145)
SODIUM BLD-SCNC: 131 MMOL/L (ref 135–145)
SOURCE, BLOOD GAS: ABNORMAL
TOTAL PROTEIN: 6.6 GM/DL (ref 6.4–8.2)
WBC # BLD: 4.9 K/CU MM (ref 4–10.5)

## 2020-09-23 PROCEDURE — 96375 TX/PRO/DX INJ NEW DRUG ADDON: CPT

## 2020-09-23 PROCEDURE — 96417 CHEMO IV INFUS EACH ADDL SEQ: CPT

## 2020-09-23 PROCEDURE — 77386 HC NTSTY MODUL RAD TX DLVR CPLX: CPT | Performed by: RADIOLOGY

## 2020-09-23 PROCEDURE — 77014 PR CT GUIDANCE PLACEMENT RAD THERAPY FIELDS: CPT | Performed by: RADIOLOGY

## 2020-09-23 PROCEDURE — 99214 OFFICE O/P EST MOD 30 MIN: CPT | Performed by: INTERNAL MEDICINE

## 2020-09-23 PROCEDURE — 2500000003 HC RX 250 WO HCPCS: Performed by: INTERNAL MEDICINE

## 2020-09-23 PROCEDURE — 6360000002 HC RX W HCPCS: Performed by: INTERNAL MEDICINE

## 2020-09-23 PROCEDURE — 85025 COMPLETE CBC W/AUTO DIFF WBC: CPT

## 2020-09-23 PROCEDURE — 80053 COMPREHEN METABOLIC PANEL: CPT

## 2020-09-23 PROCEDURE — 2580000003 HC RX 258: Performed by: INTERNAL MEDICINE

## 2020-09-23 PROCEDURE — 96413 CHEMO IV INFUSION 1 HR: CPT

## 2020-09-23 RX ORDER — POTASSIUM CHLORIDE 20 MEQ/1
20 TABLET, EXTENDED RELEASE ORAL 2 TIMES DAILY
Qty: 30 TABLET | Refills: 1 | Status: SHIPPED | OUTPATIENT
Start: 2020-09-23 | End: 2020-10-07

## 2020-09-23 RX ORDER — PALONOSETRON 0.05 MG/ML
0.25 INJECTION, SOLUTION INTRAVENOUS ONCE
Status: COMPLETED | OUTPATIENT
Start: 2020-09-23 | End: 2020-09-23

## 2020-09-23 RX ORDER — SODIUM CHLORIDE 0.9 % (FLUSH) 0.9 %
10 SYRINGE (ML) INJECTION PRN
Status: DISCONTINUED | OUTPATIENT
Start: 2020-09-23 | End: 2020-09-24 | Stop reason: HOSPADM

## 2020-09-23 RX ORDER — SODIUM CHLORIDE 9 MG/ML
20 INJECTION, SOLUTION INTRAVENOUS ONCE
Status: DISCONTINUED | OUTPATIENT
Start: 2020-09-23 | End: 2020-09-24 | Stop reason: HOSPADM

## 2020-09-23 RX ORDER — DIPHENHYDRAMINE HYDROCHLORIDE 50 MG/ML
50 INJECTION INTRAMUSCULAR; INTRAVENOUS ONCE
Status: COMPLETED | OUTPATIENT
Start: 2020-09-23 | End: 2020-09-23

## 2020-09-23 RX ADMIN — SODIUM CHLORIDE 20 ML/HR: 9 INJECTION, SOLUTION INTRAVENOUS at 10:49

## 2020-09-23 RX ADMIN — DEXAMETHASONE SODIUM PHOSPHATE 10 MG: 10 INJECTION INTRAMUSCULAR; INTRAVENOUS at 10:49

## 2020-09-23 RX ADMIN — DIPHENHYDRAMINE HYDROCHLORIDE 50 MG: 50 INJECTION INTRAMUSCULAR; INTRAVENOUS at 10:49

## 2020-09-23 RX ADMIN — PALONOSETRON 0.25 MG: 0.25 INJECTION, SOLUTION INTRAVENOUS at 10:50

## 2020-09-23 RX ADMIN — PACLITAXEL 85 MG: 6 INJECTION, SOLUTION, CONCENTRATE INTRAVENOUS at 11:07

## 2020-09-23 RX ADMIN — FAMOTIDINE 20 MG: 10 INJECTION, SOLUTION INTRAVENOUS at 10:49

## 2020-09-23 RX ADMIN — CARBOPLATIN 192 MG: 10 INJECTION, SOLUTION INTRAVENOUS at 12:18

## 2020-09-23 ASSESSMENT — PAIN SCALES - GENERAL: PAINLEVEL_OUTOF10: 6

## 2020-09-23 ASSESSMENT — PAIN DESCRIPTION - LOCATION: LOCATION: FOOT

## 2020-09-23 NOTE — PROGRESS NOTES
Patient arrived to treatment suite for blood draw, pre-medications and chemotherapy infusion. Left chest mediport accessed and flushed multiple times, but no blood return available. Patient stated this has happened multiple times, and they normally start a peripheral IV. Peripheral IV started in [facility] and blood drawn from site and sent to lab for processing. Patient has no questions or concerns for the doctor at this time, but states he has been more short of breath than normal, but also has COPD. Treatment approved and given. Patient tolerated well. Patient's status assessed and documented appropriately. All labs and required results were also reviewed today. Treatment parameters have been reviewed. Today's treatment has been approved by the provider. Treatment orders and medication sequencing (when applicable) was verified by 2 registered nurses. The treatment plan was confirmed with the patient prior to administration, and the patient understands the need to report any treatment-related symptoms. Prior to administration, when applicable, the following 8 elements of medication administration were reviewed with 2nd Registered Nurse prior to dosing: drug name, drug dose, infusion volume when prepared in a syringe, rate of administration, expiration dates and/or times, appearance and integrity of drug(s), and rate of pump for infusion. The 5 rights of medication administration have been verified.

## 2020-09-24 ENCOUNTER — HOSPITAL ENCOUNTER (OUTPATIENT)
Dept: RADIATION ONCOLOGY | Age: 65
Discharge: HOME | End: 2020-09-24
Attending: RADIOLOGY
Payer: COMMERCIAL

## 2020-09-24 PROCEDURE — 77014 PR CT GUIDANCE PLACEMENT RAD THERAPY FIELDS: CPT | Performed by: RADIOLOGY

## 2020-09-24 PROCEDURE — 77386 HC NTSTY MODUL RAD TX DLVR CPLX: CPT | Performed by: RADIOLOGY

## 2020-09-25 ENCOUNTER — HOSPITAL ENCOUNTER (OUTPATIENT)
Dept: RADIATION ONCOLOGY | Age: 65
Discharge: HOME | End: 2020-09-25
Attending: RADIOLOGY
Payer: COMMERCIAL

## 2020-09-25 PROCEDURE — 77386 HC NTSTY MODUL RAD TX DLVR CPLX: CPT | Performed by: RADIOLOGY

## 2020-09-25 PROCEDURE — 77014 PR CT GUIDANCE PLACEMENT RAD THERAPY FIELDS: CPT | Performed by: RADIOLOGY

## 2020-09-28 ENCOUNTER — HOSPITAL ENCOUNTER (OUTPATIENT)
Dept: RADIATION ONCOLOGY | Age: 65
Discharge: HOME | End: 2020-09-28
Attending: RADIOLOGY
Payer: COMMERCIAL

## 2020-09-28 VITALS — WEIGHT: 162.2 LBS | BODY MASS INDEX: 23.95 KG/M2

## 2020-09-28 PROCEDURE — 77014 PR CT GUIDANCE PLACEMENT RAD THERAPY FIELDS: CPT | Performed by: RADIOLOGY

## 2020-09-28 PROCEDURE — 77386 HC NTSTY MODUL RAD TX DLVR CPLX: CPT | Performed by: RADIOLOGY

## 2020-09-28 ASSESSMENT — PAIN DESCRIPTION - FREQUENCY: FREQUENCY: CONTINUOUS

## 2020-09-28 ASSESSMENT — PAIN SCALES - GENERAL: PAINLEVEL_OUTOF10: 3

## 2020-09-28 ASSESSMENT — PAIN - FUNCTIONAL ASSESSMENT: PAIN_FUNCTIONAL_ASSESSMENT: PREVENTS OR INTERFERES SOME ACTIVE ACTIVITIES AND ADLS

## 2020-09-28 ASSESSMENT — PAIN DESCRIPTION - ORIENTATION: ORIENTATION: RIGHT;LEFT

## 2020-09-28 ASSESSMENT — PAIN DESCRIPTION - DESCRIPTORS: DESCRIPTORS: ACHING;TIGHTNESS

## 2020-09-28 ASSESSMENT — PAIN DESCRIPTION - ONSET: ONSET: ON-GOING

## 2020-09-28 ASSESSMENT — PAIN DESCRIPTION - LOCATION: LOCATION: FOOT

## 2020-09-28 ASSESSMENT — PAIN DESCRIPTION - PROGRESSION: CLINICAL_PROGRESSION: NOT CHANGED

## 2020-09-28 NOTE — PLAN OF CARE
Care plan reviewed. _ Pt  still having productive cough with thick clear phlegm. Denies fever. Pt states coughing normal for him. No resp distress noted. _ Pt c/o pain in both feet due to swelling, this is not new for pt.   _ Pt reports moderate fatigue, reports rested over weekend and periodically throughout the day.

## 2020-09-29 ENCOUNTER — HOSPITAL ENCOUNTER (OUTPATIENT)
Dept: RADIATION ONCOLOGY | Age: 65
Discharge: HOME | End: 2020-09-29
Attending: RADIOLOGY
Payer: COMMERCIAL

## 2020-09-29 PROCEDURE — 77386 HC NTSTY MODUL RAD TX DLVR CPLX: CPT | Performed by: RADIOLOGY

## 2020-09-29 PROCEDURE — 77336 RADIATION PHYSICS CONSULT: CPT | Performed by: RADIOLOGY

## 2020-09-29 PROCEDURE — 77427 RADIATION TX MANAGEMENT X5: CPT | Performed by: RADIOLOGY

## 2020-09-29 PROCEDURE — 77014 PR CT GUIDANCE PLACEMENT RAD THERAPY FIELDS: CPT | Performed by: RADIOLOGY

## 2020-09-29 NOTE — PROGRESS NOTES
Weekly Radiation Treatment Progress Note    DATE OF SERVICE: 9/29/2020     DIAGNOSIS:  Cancer Staging  Primary malignant neoplasm of bronchus of right middle lobe [facility])  Staging form: Lung, AJCC 8th Edition  - Clinical: Stage IV (pM1) - Signed by Kwadwo Purcell MD on 7/6/2020       TREATMENT COURSE:   Oncology History   Secondary malignant neoplasm of brain ([address]   6/13/2020 Initial Diagnosis    Secondary malignant neoplasm of brain ([facility] [address]     6/13/2020 Surgery    Resection left parietal metastasis     7/15/2020 - 7/17/2020 Radiation    L Parietal Met: 24Gy (3 x 8Gy) STRS at [facility]     Primary malignant neoplasm of bronchus of right middle lobe ([facility] [address]   7/6/2020 Initial Diagnosis    Primary malignant neoplasm of bronchus of right middle lobe ([facility] [address]     8/18/2020 -  Radiation    Rt Chest Lung Mass/LNs: 60Gy IMRT         Secondary malignant neoplasm of bone and bone marrow ([facility] [address]   9/21/2020 Initial Diagnosis    Secondary malignant neoplasm of bone and bone marrow ([facility])  Isolated left sacral met on PET           Site: right chest  Current Radiation Dose: 5800 cGy    Subjective:  Feeling tired today and has increased dysphagia      EXAM  There were no vitals filed for this visit.   NAD  No thrush, no skin changes    Setup images, chart, plan reviewed      A/P:   Tolerating tx well  Continue RT as planned    EOT tomorrow - RTC 1 month to see Dr. Kulwinder Russell      Electronically signed by Carol Pillai MD on 9/29/2020 at 2:34 PM

## 2020-09-30 ENCOUNTER — APPOINTMENT (OUTPATIENT)
Dept: RADIATION ONCOLOGY | Age: 65
End: 2020-09-30
Attending: RADIOLOGY
Payer: COMMERCIAL

## 2020-09-30 ENCOUNTER — HOSPITAL ENCOUNTER (OUTPATIENT)
Dept: INFUSION THERAPY | Age: 65
Discharge: HOME | End: 2020-09-30
Payer: COMMERCIAL

## 2020-09-30 VITALS
HEIGHT: 69 IN | SYSTOLIC BLOOD PRESSURE: 79 MMHG | DIASTOLIC BLOOD PRESSURE: 46 MMHG | WEIGHT: 158 LBS | BODY MASS INDEX: 23.4 KG/M2 | TEMPERATURE: 97 F | RESPIRATION RATE: 18 BRPM | HEART RATE: 101 BPM

## 2020-09-30 DIAGNOSIS — C34.2 PRIMARY MALIGNANT NEOPLASM OF BRONCHUS OF RIGHT MIDDLE LOBE (HCC): Primary | ICD-10-CM

## 2020-09-30 DIAGNOSIS — C79.31 SECONDARY MALIGNANT NEOPLASM OF BRAIN (HCC): ICD-10-CM

## 2020-09-30 LAB
ALBUMIN SERPL-MCNC: 3.6 GM/DL (ref 3.4–5)
ALP BLD-CCNC: 165 IU/L (ref 40–128)
ALT SERPL-CCNC: 11 U/L (ref 10–40)
ANION GAP SERPL CALCULATED.3IONS-SCNC: 18 MMOL/L (ref 4–16)
AST SERPL-CCNC: 25 IU/L (ref 15–37)
BASOPHILS ABSOLUTE: 0 K/CU MM
BASOPHILS RELATIVE PERCENT: 0.2 % (ref 0–1)
BILIRUB SERPL-MCNC: 0.9 MG/DL (ref 0–1)
BUN BLDV-MCNC: 16 MG/DL (ref 6–23)
CALCIUM SERPL-MCNC: 9.1 MG/DL (ref 8.3–10.6)
CHLORIDE BLD-SCNC: 89 MMOL/L (ref 99–110)
CO2: 25 MMOL/L (ref 21–32)
CREAT SERPL-MCNC: 1.1 MG/DL (ref 0.9–1.3)
DIFFERENTIAL TYPE: ABNORMAL
EOSINOPHILS ABSOLUTE: 0 K/CU MM
EOSINOPHILS RELATIVE PERCENT: 0 % (ref 0–3)
GFR AFRICAN AMERICAN: >60 ML/MIN/1.73M2
GFR AFRICAN AMERICAN: >60 ML/MIN/1.73M2
GFR NON-AFRICAN AMERICAN: >60 ML/MIN/1.73M2
GFR NON-AFRICAN AMERICAN: >60 ML/MIN/1.73M2
GLUCOSE BLD-MCNC: 122 MG/DL (ref 70–99)
GLUCOSE BLD-MCNC: 125 MG/DL (ref 70–99)
HCT VFR BLD CALC: 35.8 % (ref 42–52)
HEMOGLOBIN: 12.4 GM/DL (ref 13.5–18)
LYMPHOCYTES ABSOLUTE: 0.2 K/CU MM
LYMPHOCYTES RELATIVE PERCENT: 4.6 % (ref 24–44)
MCH RBC QN AUTO: 28.8 PG (ref 27–31)
MCHC RBC AUTO-ENTMCNC: 34.6 % (ref 32–36)
MCV RBC AUTO: 83.1 FL (ref 78–100)
MONOCYTES ABSOLUTE: 0.6 K/CU MM
MONOCYTES RELATIVE PERCENT: 14.8 % (ref 0–4)
PDW BLD-RTO: 17.1 % (ref 11.7–14.9)
PLATELET # BLD: 247 K/CU MM (ref 140–440)
PMV BLD AUTO: 9.3 FL (ref 7.5–11.1)
POC CALCIUM: 0.99 MMOL/L (ref 1.12–1.32)
POC CHLORIDE: 91 MMOL/L (ref 98–109)
POC CREATININE: 1.1 MG/DL (ref 0.9–1.3)
POTASSIUM SERPL-SCNC: 3 MMOL/L (ref 3.6–5.1)
POTASSIUM SERPL-SCNC: 3.6 MMOL/L (ref 3.5–5.1)
RBC # BLD: 4.31 M/CU MM (ref 4.6–6.2)
SEGMENTED NEUTROPHILS ABSOLUTE COUNT: 3.5 K/CU MM
SEGMENTED NEUTROPHILS RELATIVE PERCENT: 80.4 % (ref 36–66)
SODIUM BLD-SCNC: 131 MMOL/L (ref 136–145)
SODIUM BLD-SCNC: 132 MMOL/L (ref 135–145)
TOTAL PROTEIN: 6.5 GM/DL (ref 6.4–8.2)
WBC # BLD: 4.3 K/CU MM (ref 4–10.5)

## 2020-09-30 PROCEDURE — 85025 COMPLETE CBC W/AUTO DIFF WBC: CPT

## 2020-09-30 PROCEDURE — 96360 HYDRATION IV INFUSION INIT: CPT

## 2020-09-30 PROCEDURE — 2580000003 HC RX 258: Performed by: INTERNAL MEDICINE

## 2020-09-30 PROCEDURE — 80053 COMPREHEN METABOLIC PANEL: CPT

## 2020-09-30 PROCEDURE — 96374 THER/PROPH/DIAG INJ IV PUSH: CPT

## 2020-09-30 PROCEDURE — 96375 TX/PRO/DX INJ NEW DRUG ADDON: CPT

## 2020-09-30 PROCEDURE — 6360000002 HC RX W HCPCS: Performed by: INTERNAL MEDICINE

## 2020-09-30 PROCEDURE — 36593 DECLOT VASCULAR DEVICE: CPT

## 2020-09-30 PROCEDURE — 96361 HYDRATE IV INFUSION ADD-ON: CPT

## 2020-09-30 RX ORDER — SODIUM CHLORIDE 0.9 % (FLUSH) 0.9 %
10 SYRINGE (ML) INJECTION PRN
Status: DISCONTINUED | OUTPATIENT
Start: 2020-09-30 | End: 2020-10-01 | Stop reason: HOSPADM

## 2020-09-30 RX ORDER — SODIUM CHLORIDE 0.9 % (FLUSH) 0.9 %
10 SYRINGE (ML) INJECTION PRN
Status: DISCONTINUED | OUTPATIENT
Start: 2020-09-30 | End: 2020-09-30

## 2020-09-30 RX ORDER — SODIUM CHLORIDE 0.9 % (FLUSH) 0.9 %
10 SYRINGE (ML) INJECTION PRN
Status: CANCELLED | OUTPATIENT
Start: 2020-09-30

## 2020-09-30 RX ORDER — SODIUM CHLORIDE 0.9 % (FLUSH) 0.9 %
5 SYRINGE (ML) INJECTION PRN
Status: CANCELLED | OUTPATIENT
Start: 2020-09-30

## 2020-09-30 RX ORDER — HEPARIN SODIUM (PORCINE) LOCK FLUSH IV SOLN 100 UNIT/ML 100 UNIT/ML
500 SOLUTION INTRAVENOUS PRN
Status: CANCELLED | OUTPATIENT
Start: 2020-09-30

## 2020-09-30 RX ORDER — SODIUM CHLORIDE 9 MG/ML
20 INJECTION, SOLUTION INTRAVENOUS ONCE
Status: DISCONTINUED | OUTPATIENT
Start: 2020-09-30 | End: 2020-10-01 | Stop reason: HOSPADM

## 2020-09-30 RX ORDER — HEPARIN SODIUM (PORCINE) LOCK FLUSH IV SOLN 100 UNIT/ML 100 UNIT/ML
500 SOLUTION INTRAVENOUS PRN
Status: DISCONTINUED | OUTPATIENT
Start: 2020-09-30 | End: 2020-10-01 | Stop reason: HOSPADM

## 2020-09-30 RX ORDER — 0.9 % SODIUM CHLORIDE 0.9 %
1000 INTRAVENOUS SOLUTION INTRAVENOUS ONCE
Status: CANCELLED | OUTPATIENT
Start: 2020-09-30

## 2020-09-30 RX ORDER — SODIUM CHLORIDE 0.9 % (FLUSH) 0.9 %
5 SYRINGE (ML) INJECTION PRN
Status: DISCONTINUED | OUTPATIENT
Start: 2020-09-30 | End: 2020-09-30

## 2020-09-30 RX ORDER — 0.9 % SODIUM CHLORIDE 0.9 %
1000 INTRAVENOUS SOLUTION INTRAVENOUS ONCE
Status: COMPLETED | OUTPATIENT
Start: 2020-09-30 | End: 2020-09-30

## 2020-09-30 RX ADMIN — ONDANSETRON: 2 INJECTION INTRAMUSCULAR; INTRAVENOUS at 13:48

## 2020-09-30 RX ADMIN — Medication 500 UNITS: at 15:50

## 2020-09-30 RX ADMIN — ALTEPLASE 2 MG: 2.2 INJECTION, POWDER, LYOPHILIZED, FOR SOLUTION INTRAVENOUS at 12:03

## 2020-09-30 RX ADMIN — SODIUM CHLORIDE 1000 ML: 9 INJECTION, SOLUTION INTRAVENOUS at 13:35

## 2020-10-01 ENCOUNTER — TELEPHONE (OUTPATIENT)
Dept: ONCOLOGY | Age: 65
End: 2020-10-01

## 2020-10-01 RX ORDER — POTASSIUM CHLORIDE 20 MEQ/1
20 TABLET, EXTENDED RELEASE ORAL 2 TIMES DAILY
Qty: 10 TABLET | Refills: 0 | Status: SHIPPED | OUTPATIENT
Start: 2020-10-01 | End: 2020-10-07

## 2020-10-01 NOTE — TELEPHONE ENCOUNTER
Per Dr Nidia Olivera, NN phoned pt and left message regarding results of labs and the need to take oral KCL.

## 2020-10-02 ENCOUNTER — OFFICE VISIT (OUTPATIENT)
Dept: ONCOLOGY | Age: 65
End: 2020-10-02
Payer: COMMERCIAL

## 2020-10-02 ENCOUNTER — HOSPITAL ENCOUNTER (OUTPATIENT)
Dept: INFUSION THERAPY | Age: 65
Discharge: HOME | End: 2020-10-02
Payer: COMMERCIAL

## 2020-10-02 ENCOUNTER — TELEPHONE (OUTPATIENT)
Dept: ONCOLOGY | Age: 65
End: 2020-10-02

## 2020-10-02 VITALS
BODY MASS INDEX: 23.88 KG/M2 | SYSTOLIC BLOOD PRESSURE: 89 MMHG | HEART RATE: 58 BPM | OXYGEN SATURATION: 100 % | DIASTOLIC BLOOD PRESSURE: 59 MMHG | TEMPERATURE: 97.7 F | HEIGHT: 69 IN | WEIGHT: 161.2 LBS

## 2020-10-02 PROCEDURE — 99211 OFF/OP EST MAY X REQ PHY/QHP: CPT

## 2020-10-02 PROCEDURE — 99214 OFFICE O/P EST MOD 30 MIN: CPT | Performed by: INTERNAL MEDICINE

## 2020-10-02 RX ORDER — SODIUM CHLORIDE 0.9 % (FLUSH) 0.9 %
5 SYRINGE (ML) INJECTION PRN
Status: CANCELLED | OUTPATIENT
Start: 2020-10-15

## 2020-10-02 RX ORDER — METHYLPREDNISOLONE SODIUM SUCCINATE 125 MG/2ML
125 INJECTION, POWDER, LYOPHILIZED, FOR SOLUTION INTRAMUSCULAR; INTRAVENOUS ONCE
Status: CANCELLED | OUTPATIENT
Start: 2020-10-15

## 2020-10-02 RX ORDER — EPINEPHRINE 1 MG/ML
0.3 INJECTION, SOLUTION, CONCENTRATE INTRAVENOUS PRN
Status: CANCELLED | OUTPATIENT
Start: 2020-10-15

## 2020-10-02 RX ORDER — DIPHENHYDRAMINE HYDROCHLORIDE 50 MG/ML
50 INJECTION INTRAMUSCULAR; INTRAVENOUS ONCE
Status: CANCELLED | OUTPATIENT
Start: 2020-10-15

## 2020-10-02 RX ORDER — HEPARIN SODIUM (PORCINE) LOCK FLUSH IV SOLN 100 UNIT/ML 100 UNIT/ML
500 SOLUTION INTRAVENOUS PRN
Status: CANCELLED | OUTPATIENT
Start: 2020-10-15

## 2020-10-02 RX ORDER — MEPERIDINE HYDROCHLORIDE 50 MG/ML
12.5 INJECTION INTRAMUSCULAR; INTRAVENOUS; SUBCUTANEOUS ONCE
Status: CANCELLED | OUTPATIENT
Start: 2020-10-15

## 2020-10-02 RX ORDER — SODIUM CHLORIDE 9 MG/ML
INJECTION, SOLUTION INTRAVENOUS CONTINUOUS
Status: CANCELLED | OUTPATIENT
Start: 2020-10-15

## 2020-10-02 RX ORDER — SODIUM CHLORIDE 9 MG/ML
20 INJECTION, SOLUTION INTRAVENOUS ONCE
Status: CANCELLED | OUTPATIENT
Start: 2020-10-15

## 2020-10-02 RX ORDER — SODIUM CHLORIDE 0.9 % (FLUSH) 0.9 %
10 SYRINGE (ML) INJECTION PRN
Status: CANCELLED | OUTPATIENT
Start: 2020-10-15

## 2020-10-02 NOTE — PROGRESS NOTES
related, and attention on follow-up imaging is recommended. Surrounding vasogenic edema and mass effect have improved. New tiny focus of restricted diffusion in the left occipital lobe which could be a recent infarct (acute to subacute). No hemorrhage or mass effect associated with this. He was subsequently referred to me for further evaluation. He was seen by radiation oncologist at [facility] and he received SBRT to his brain surgical site between 7/15 and 7/17/2020. PET/CT scan done on July 9, 2020 showed Intense metabolic activity associated with the masslike opacification of the right middle lobe which may represent primary lung cancer and/or postobstructive pneumonia. Metabolically active right hilar and subcarinal lymph node metastasis. Solitary metabolically active skeletal metastasis in the left aspect of the sacrum. Definitive concurrent chemoradiation therapy was started since 8/18/2020 (started RT on 8/18/20 and chemo on 8/19/20) and he completed his last RT on 9/29/20. On October 2, 2020, he presented to me for follow-up. I have been following him for metastatic non small cell lung cancer. He is s/p surgery followed by SBRT to his solitary brain metastasis. He is also status post definitive concurrent chemoradiation therapy to his lung lesions. He only has oligo metastasis. We will consider to give consolidation RT to his left sacrum. I recommend him to follow up with Dr. Daina Shay for that. Since he has completed definitive concurrent chemoradaition therapy, I recommend him to start first line chemotherapy with pembrolizumab starting from 10/15/20. I will continue to follow him closely during immunotherapy. He is feeling better after IV hydration therapy yesterday. Still has some tiredness and fatigue from chemoradiation. Past Medical History  Significant for  1. HIV/AIDS since 2005  2. COPD  3. GERD  4. 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   Right inguinal hernia repair   2. Tonsillectomy in 1960s  3. Left wrist fracture surgery in 2/28/2020  4. Brain metastasis removal on June 2020    Allergies  No known medication allergies    Social History  He is a former smoker and he quit smoking in 2005. He used to smoke 1 pack per day for about 32 years. He is currently living in [facility]. Family History  Significant for breast cancer in his mother, melanoma in his maternal uncle, lung cancer in his maternal grandmother and leukemia in his maternal grandfather. Review of Systems: \"Per interval history; otherwise 10 point ROS is negative. \"  His energy level is a little better, appetite, and sleep are okay. No fever, chills, night sweats, cough, shortness of breath, chest pain, hemoptysis, or palpitations. His bowel and bladder functions are normal except he still has some dysphagia. No nausea, vomiting, abdominal pain, diarrhea, constipation, dysuria, loss of appetite, or weight loss. He doesn't have any neuropathy and he does not have bleeding or clotting issues. He denies any pain in his body. No anxiety or depression. The rest of the systems are unremarkable.     Vital Signs:  BP (!) 89/59 (Site: Right Upper Arm, Position: Sitting, Cuff Size: Medium Adult)   Pulse 58   Temp 97.7 °F (36.5 °C) (Temporal)   Ht 5' 9\" (1.753 m)   Wt 161 lb 3.2 oz (73.1 kg)   SpO2 100%   BMI 23.81 kg/m²     Physical Exam:  CONSTITUTIONAL: awake, alert, cooperative, no apparent distress   EYES: pupils equal, round and reactive to light, sclera clear and conjunctiva normal  ENT: Normocephalic, without obvious abnormality, atraumatic  NECK: supple, symmetrical, no jugular venous distension and no carotid bruits   HEMATOLOGIC/LYMPHATIC: no cervical, supraclavicular or axillary lymphadenopathy   LUNGS: no rhonchi, no wheezes, VBS, no crackles, no increased work of breathing and clear to auscultation   CARDIOVASCULAR: regular rate and rhythm, normal S1 and S2, no murmur noted  ABDOMEN: normal bowel sounds x 4, soft, non-distended, non-tender, no masses palpated, no hepatosplenomegaly   MUSCULOSKELETAL: full range of motion noted, tone is normal  NEUROLOGIC: awake, alert, oriented to name, place and time. Motor skills grossly intact. SKIN: Normal skin color, texture, turgor and no jaundice.  appears intact   EXTREMITIES: no clubbing, no leg swelling, no LE edema, no cyanosis     Labs:  Hematology:  Lab Results   Component Value Date    WBC 4.3 09/30/2020    RBC 4.31 (L) 09/30/2020    HGB 12.4 (L) 09/30/2020    HCT 35.8 (L) 09/30/2020    MCV 83.1 09/30/2020    MCH 28.8 09/30/2020    MCHC 34.6 09/30/2020    RDW 17.1 (H) 09/30/2020     09/30/2020    MPV 9.3 09/30/2020    SEGSPCT 80.4 (H) 09/30/2020    EOSRELPCT 0.0 09/30/2020    BASOPCT 0.2 09/30/2020    LYMPHOPCT 4.6 (L) 09/30/2020    MONOPCT 14.8 (H) 09/30/2020    SEGSABS 3.5 09/30/2020    EOSABS 0.0 09/30/2020    BASOSABS 0.0 09/30/2020    LYMPHSABS 0.2 09/30/2020    MONOSABS 0.6 09/30/2020    DIFFTYPE AUTOMATED DIFFERENTIAL 09/30/2020     No results found for: ESR  Chemistry:  Lab Results   Component Value Date     (L) 09/30/2020    K 3.0 (L) 09/30/2020    CL 89 (L) 09/30/2020    CO2 25 09/30/2020    BUN 16 09/30/2020    CREATININE 1.1 09/30/2020    GLUCOSE 122 (H) 09/30/2020    CALCIUM 9.1 09/30/2020    PROT 6.5 09/30/2020    LABALBU 3.6 09/30/2020    BILITOT 0.9 09/30/2020    ALKPHOS 165 (H) 09/30/2020    AST 25 09/30/2020    ALT 11 09/30/2020    LABGLOM >60 09/30/2020    GFRAA >60 09/30/2020    AGRATIO 1.2 09/21/2020    GLOB 2.9 09/21/2020    PHOS 3.7 08/31/2016    MG 2.5 (H) 06/13/2020    POCCA 0.99 (L) 09/30/2020    POCGLU 125 (H) 09/30/2020     No results found for: MMA, LDH, HOMOCYSTEINE  No components found for: LD  Lab Results   Component Value Date    TSHHS 1.720 05/09/2017    T4FREE 1.47 08/29/2016     Immunology:  Lab Results   Component Value Date    PROT 6.5 09/30/2020     No results found for: solitary brain metastasis. He is also status post definitive concurrent chemoradiation therapy to his lung lesions. He only has oligo metastasis. We will consider to give consolidation RT to his left sacrum. I recommend him to follow up with Dr. Lizbeth French for that. Since he has completed definitive concurrent chemoradaition therapy, I recommend him to start first line chemotherapy with pembrolizumab starting from 10/15/20. I will continue to follow him closely during immunotherapy. He is feeling better after IV hydration therapy yesterday. I answered all his questions and concerns for today. I asked him to follow up with her primary care physician on regular basis. I will continue to keep you updated on his progress. Thank you for allowing me to participate in the care of this very pleasant patient. Recent imaging and labs were reviewed and discussed with the patient.       Electronically signed by Autumn March MD on 8/23/20 at 4:05 PM EDT

## 2020-10-03 ENCOUNTER — HOSPITAL ENCOUNTER (OUTPATIENT)
Dept: LAB | Age: 65
Discharge: HOME | End: 2020-10-03
Payer: COMMERCIAL

## 2020-10-05 ENCOUNTER — TELEPHONE (OUTPATIENT)
Dept: ONCOLOGY | Age: 65
End: 2020-10-05

## 2020-10-05 ENCOUNTER — HOSPITAL ENCOUNTER (OUTPATIENT)
Dept: LAB | Age: 65
Discharge: HOME | End: 2020-10-05
Payer: COMMERCIAL

## 2020-10-05 PROCEDURE — C9803 HOPD COVID-19 SPEC COLLECT: HCPCS

## 2020-10-05 PROCEDURE — U0002 COVID-19 LAB TEST NON-CDC: HCPCS

## 2020-10-05 NOTE — PROGRESS NOTES
developed. Breathing was well-maintained throughout the course of treatments. Mild dysphagia or odynophagia developed. FOLLOW-UP PLANS:   The patient is to return to see me in 1 month with a chest and pelvic CT prior or to return sooner as needed. Consideration for SBRT for the sacral oligometastasis will be undertaken at that time.     Electronically signed by Electronically signed by Jaren Garcia MD on 10/5/2020 at 9:50 AM

## 2020-10-06 LAB
SARS-COV-2: NOT DETECTED
SOURCE: NORMAL

## 2020-10-06 ASSESSMENT — ENCOUNTER SYMPTOMS
GASTROINTESTINAL NEGATIVE: 1
COUGH: 1
SHORTNESS OF BREATH: 1
EYES NEGATIVE: 1

## 2020-10-06 NOTE — PROGRESS NOTES
10/7/2020     Diagnosis Orders   1. Asymptomatic HIV infection (HCC)  CBC Auto Differential    Basic Metabolic Panel    Flow Cytometry [place] CD4/CD8 Blood    Hepatic Function Panel    HIV-1 RNA, quantitative, PCR    sulfamethoxazole-trimethoprim (BACTRIM DS;SEPTRA DS) 800-160 MG per tablet   2. Primary malignant neoplasm of bronchus of right middle lobe ([address]     3. Secondary malignant neoplasm of bone and bone marrow (HCC)     4. 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 Scheduled to begin immunotherapy soon. · CD 4 count is low, viral load is undetectable. Likely due to chemotherapy. Commence Bactrim for PCP ppx. See in one month. · Neuropathic pain in both legs, unable to tolerate gabapentin   No components found for: CD4H No components found for: HIVRN    HIV VL< 20 (9/28/2020), CD 4: 125   Hepatitis C Ab negative (12/28/17);   Hep B immune, Hep A non-immune-  Remains immune to hep B   Chronic medical problems    Lipid recommendation: being managed by PCP   Labs to be done every 3-6 months: CBC, CD4, VL, BMP, LFTs   Labs to be done once every 12 months (last 6/2020: HCV, Lipids, T-SPOT (negative6/22/2020), RPR, UA   Vaccines: PCV13/PSSV23, Tdap, yearly flu recommended f/up with PCP and Pulmonologist   Colorectal cancer screen: 2016 benign polyps; repeat as directed by GI   Prostate cancer screen done about 3 years ago, no issues at the time    Dexa scan: 8/10/16 showed osteopenia and repeat DEXA scan 1/7/19 ; non-adherent to Calcium/Vit D, owing to pill burden.      Future Appointments   Date Time Provider Rowdy Bellamy   10/7/2020  2:30 PM Raúl Finn MD [facility] DEXTER Castillo   10/8/2020 12:30 PM SPECIALS ROOM 02 [address] [facility]   10/15/2020  1:30 PM SCHEDULE, [address] [place]   10/15/2020  2:15 PM Gabriel Jenkins, APRN - CNP [address] [facility] [place] MED ONC [facility]   11/3/2020 10:30 AM [address] [facility], RAD ONC NURSE 2 [facility] RAD ONC [place]   11/3/2020 10:50 AM Marianela Ozuna MD [facility] 9091   11/4/2020  9:00 AM Naina Villa MD INFECT DIS  [facility]   11/5/2020 11:30 AM Roque Vernon MD [address] [facility]       Ori Boyd is a 59 y.o.  male     Chief Complaint: follow up for HIV     HISTORY OF PRESENT ILLNESS:  MSM diagnosed in 2005. Contracted through sex. CD4 vesta : 195. Treated with Truvada and Sustiva, but transitioned to Atripla. Started Triumeq in 2/2018, has tolerated it without any issues. Has not had sex since 2010. Denies recreational drug use.  Tolerating Triumeq  · Continues to have neuropathic pain. 7/8: recently diagnosed with NSCLC with brain metastasis, underwent excision of brain mass at [facility]. Chemotherapy and radiation is planned. Planned chemotherapy daily for 14 weeks. PET scan will help guide therapy  10/7: finished 30 radiation treatments for his lung cancer. Seven chemotherapy rx. Planned commencement of immunotherapy next week. The \"[facility]ner\" is treating the brain mets, but gave the recommendation for immunotherapy to the cancer center, here, in [facility]. ART Adherance/Tolerability is excellent    Review of Systems   Constitutional: Negative. HENT: Negative. Eyes: Negative. Respiratory: Positive for cough and shortness of breath (due to COPD, improved with inhalers). Cardiovascular: Negative. Gastrointestinal: Negative. Genitourinary: Negative. Musculoskeletal: Negative. Skin: Negative.         Patient Active Problem List    Diagnosis Date Noted    Secondary malignant neoplasm of bone and bone marrow ([address] 09/21/2020    Asymptomatic HIV infection ([address] 07/13/2020    Secondary malignant neoplasm of brain ([address] 07/06/2020    Primary malignant neoplasm of bronchus of right middle lobe ([address] 07/06/2020    Abnormal CXR 06/13/2020 Added automatically from request for surgery [id number]      Seizure ([facility] 06/13/2020    S/P ORIF (open reduction internal fixation) fracture 04/14/2020    Closed fracture of left distal radius     Osteopenia of multiple sites 01/03/2019    Polyneuropathy in diseases classified elsewhere ([facility] 02/01/2018    Recurrent major depressive disorder, in partial remission ([facility] 10/13/2017    Pharyngoesophageal dysphagia     Gastroesophageal reflux disease without esophagitis     Exertional dyspnea 08/28/2016    Dizziness 08/28/2016    HIV (human immunodeficiency virus infection) ([facility] 08/04/2016     Overview:   · HIV Diagnosed (When/Where):  [facility]  · HIV Complications: None  · CD4 Arturo:  195  · Current ART (Started when): Atripla (2005)  · Previous ART: NA  · Known Resistance:  None      Pulmonary emphysema ([place] [address] 08/04/2016    Vitamin D deficiency 01/28/2016    Insomnia 01/28/2016    Former smoker 01/28/2016    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 ([facility] 01/28/2016    Chronic bronchitis ([place] [address] 12/10/2013       Current Outpatient Medications   Medication Sig Dispense Refill    sulfamethoxazole-trimethoprim (BACTRIM DS;SEPTRA DS) 800-160 MG per tablet Take 1 tablet by mouth every 24 hours 90 tablet 0    potassium chloride (KLOR-CON M) 20 MEQ extended release tablet Take 1 tablet by mouth 2 times daily 10 tablet 0    Magic Mouthwash (MIRACLE MOUTHWASH) Swish and spit 5 mLs 4 times daily as needed for Irritation 400 mL 2    potassium chloride (KLOR-CON M) 20 MEQ extended release tablet Take 1 tablet by mouth 2 times daily 30 tablet 1    traMADol (ULTRAM) 50 MG tablet TAKE 1 TABLET BY MOUTH EVERY 6 HOURS AS NEEDED FOR PAIN      TRIUMEQ 600- MG TABS TAKE ONE TABLET BY MOUTH ONCE DAILY. STORE IN ORIGINAL BOTTLE AT ROOM TEMPERATURE.  30 tablet 4    hydroCHLOROthiazide (HYDRODIURIL) 25 MG tablet Take 25 mg by mouth daily      dexamethasone (DECADRON) 2 MG tablet Take 8 mg (4 tablets) the night before first chemotherapy only After that take one tablet daily for two days starting the day after chemotherapy. 18 tablet 0    budesonide (PULMICORT FLEXHALER) 180 MCG/ACT AEPB inhaler Inhale 2 puffs into the lungs 2 times daily. 1 each 3    albuterol-ipratropium (COMBIVENT RESPIMAT)  MCG/ACT AERS inhaler Inhale 1 puff into the lungs every 6 hours as needed for Wheezing 4 g 3    guaiFENesin (MUCINEX) 600 MG extended release tablet Take 1,200 mg by mouth 2 times daily      levETIRAcetam (KEPPRA) 500 MG tablet TAKE 1 TABLET BY MOUTH EVERY 12 HOURS      theophylline (UNIPHYL) 400 MG extended release tablet Take one half tablet by mouth twice daily 90 tablet 3    Respiratory Therapy Supplies (NEBULIZER/TUBING/MOUTHPIECE) KIT 1 kit by Does not apply route daily as needed. Dx copd 496 1 kit 0    zolpidem (AMBIEN) 10 MG tablet Take 10 mg by mouth nightly as needed. No current facility-administered medications for this visit.         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 ([address]    Exertional dyspnea    Dizziness    HIV (human immunodeficiency virus infection) ([address]    Pharyngoesophageal dysphagia    Gastroesophageal reflux disease without esophagitis    Recurrent major depressive disorder, in partial remission ([address]    Osteopenia of multiple sites    Closed fracture of left distal radius    S/P ORIF (open reduction internal fixation) fracture    Secondary malignant neoplasm of brain ([address]    Primary malignant neoplasm of bronchus of right middle lobe ([address]    Abnormal CXR    Pulmonary emphysema ([address]    Polyneuropathy in diseases classified elsewhere ([address]    Seizure ([address]    Asymptomatic HIV infection ([address]    Secondary malignant neoplasm of bone and bone marrow ([address]       Past Medical History:   Diagnosis Date    COPD (chronic obstructive pulmonary disease) ([address]     Dr. Cee Mantilla Emphysema     GERD (gastroesophageal reflux disease)     No meds as of 20    HIV disease ([facility] 2005    Follows with Dr. 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  10 + yrs ago    COLONOSCOPY  5/21/15    polyp, int hem, biopsy - Dr. Taty Marshall  20+ yrs ago    Peridontal disease    ENDOSCOPY, COLON, DIAGNOSTIC  5/21/15    egd bx, mild esophagitis, esoph ring, dil, hiatal hernia - Dr. Gokul Weinstein Right 2020    INGUINAL     TONSILLECTOMY      VASC UPPER EXTREMITY VENOUS  UNI  2020    MEDIPORT    WRIST FRACTURE SURGERY Left 2020    WRIST OPEN REDUCTION INTERNAL FIXATION LEFT performed by Rafia Mccloud MD at [address] [place] [address] Marital status: Single     Spouse name: Not on file    Number of children: Not on file    Years of education: Not on file    Highest education level: Not on file   Occupational History    Occupation: retired/   Social Needs    Financial resource strain: Not on file    Food insecurity     Worry: Not on file     Inability: Not on file   Adimab needs     Medical: Not on file     Non-medical: Not on file   Tobacco Use    Smoking status: Former Smoker     Packs/day: 1.00     Years: 32.00     Pack years: 32.00     Last attempt to quit: 2004     Years since quittin.4    Smokeless tobacco: Never Used   Substance and Sexual Activity    Alcohol use: No    Drug use: No    Sexual activity: Not Currently     Partners: Female, Male   Lifestyle    Physical activity     Days per week: Not on file     Minutes per session: Not on file    Stress: Not on file   Relationships    Social connections     Talks on phone: Not on file     Gets together: Not on file     Attends Hinduism service: Not on file     Active member of club or organization: Not on file     Attends meetings of clubs or organizations: Not on file     Relationship status: Not on file    Intimate partner violence     Fear of current or ex partner: Not on file     Emotionally abused: Not on file     Physically abused: Not on file     Forced sexual activity: Not on file   Other Topics Concern    Not on file   Social History Narrative    Not on file       Family History   Problem Relation Age of Onset    Cancer Mother         breast ca    Heart Disease Father     High Cholesterol Sister     Cancer Maternal Uncle         Melanoma    Cancer Maternal Grandmother         Lung (Smoker)    Heart Disease Maternal Grandmother     Cancer Maternal Grandfather         Leukemia    Heart Disease Maternal Grandfather     Heart Disease Paternal Grandmother     Heart Disease Paternal Grandfather        Most recent labs reviewed with patient    CD4 / T Cells:   No components found for: CD4H  HIV Viral Load:  No components found for: HIVRN   WBC:  Lab Results   Component Value Date    WBC 4.3 09/30/2020    WBC 4.9 09/23/2020    WBC 5.4 09/21/2020      Creatinine:  Lab Results   Component Value Date    CREATININE 1.1 09/30/2020    CREATININE 1.1 09/30/2020    CREATININE 1.1 09/23/2020    CREATININE 1.1 09/23/2020    CREATININE 1.1 09/21/2020     ALT:  Lab Results   Component Value Date    ALT 11 09/30/2020    ALT 8 09/23/2020    ALT 9 09/21/2020     LDL CHOL:  No components found for: LDL    Current problems and complaints as above.   Otherwise unremarkable including HEENT, Constitutional, Cardiovascular, Respiratory, GI, Musculoskeletal, Skin, Endocrine, Hemo/Lympatic, Neurologic    Vital Signs:  Vitals:    10/07/20 0855   BP: 96/78   Site: Right Upper Arm   Position: Sitting   Cuff Size: Large Adult   Temp: 96.6 °F (35.9 °C)   TempSrc: Infrared   Weight: 162 lb 12.8 oz (73.8 kg)     Wt Readings from Last 3 Encounters:   10/07/20 162 lb 12.8 oz (73.8 kg)   10/02/20 161 lb 3.2 oz (73.1 kg)   09/30/20 158 lb (71.7 kg)      Estimated body mass index is 24.04 kg/m² as calculated from the following:    Height as of 10/2/20: 5' 9\" (1.753 m). Weight as of this encounter: 162 lb 12.8 oz (73.8 kg). Physical Exam:   Gen: alert, NAD  HEENT: AT/NC, no icterus, no oral lesions, extracted teeth, no gingivial disease  Neck: supple, no JVD, no cervical lymphadenopathy, trachea midline, no thyromegaly or thyroid nodules. Chest: reduced air entry bilaterally  Heart: regular rate and rhythm, no murmurs, gallop, or rub. ABD: soft, non-distended, no HSM/masses, non-tender, normoactive bowel sounds  EXT: no cyanosis, no clubbing, no splinter hemorrhages, no edema  NEURO: CN 2-12 intact, no focal deficits, normal gait  Derm: no rashes. Psych: normal affect.      Imaging Studies:   Reviewed - recent studies reviewed

## 2020-10-07 ENCOUNTER — OFFICE VISIT (OUTPATIENT)
Dept: INFECTIOUS DISEASES | Age: 65
End: 2020-10-07
Payer: COMMERCIAL

## 2020-10-07 VITALS
TEMPERATURE: 96.6 F | BODY MASS INDEX: 24.04 KG/M2 | WEIGHT: 162.8 LBS | SYSTOLIC BLOOD PRESSURE: 96 MMHG | DIASTOLIC BLOOD PRESSURE: 78 MMHG

## 2020-10-07 PROCEDURE — 99214 OFFICE O/P EST MOD 30 MIN: CPT | Performed by: INTERNAL MEDICINE

## 2020-10-07 RX ORDER — SULFAMETHOXAZOLE AND TRIMETHOPRIM 800; 160 MG/1; MG/1
1 TABLET ORAL EVERY 24 HOURS
Qty: 90 TABLET | Refills: 0 | Status: SHIPPED | OUTPATIENT
Start: 2020-10-07 | End: 2020-11-04 | Stop reason: ALTCHOICE

## 2020-10-07 ASSESSMENT — PATIENT HEALTH QUESTIONNAIRE - PHQ9
SUM OF ALL RESPONSES TO PHQ QUESTIONS 1-9: 0
1. LITTLE INTEREST OR PLEASURE IN DOING THINGS: 0
DEPRESSION UNABLE TO ASSESS: FUNCTIONAL CAPACITY MOTIVATION LIMITS ACCURACY
2. FEELING DOWN, DEPRESSED OR HOPELESS: 0
SUM OF ALL RESPONSES TO PHQ9 QUESTIONS 1 & 2: 0
SUM OF ALL RESPONSES TO PHQ QUESTIONS 1-9: 0

## 2020-10-07 NOTE — PATIENT INSTRUCTIONS
Please have your lab work done one week before your next visit. Stop Bactrim if you notice any body rash.

## 2020-10-08 ENCOUNTER — HOSPITAL ENCOUNTER (OUTPATIENT)
Dept: INTERVENTIONAL RADIOLOGY/VASCULAR | Age: 65
Discharge: HOME | End: 2020-10-08
Payer: COMMERCIAL

## 2020-10-08 VITALS
HEART RATE: 96 BPM | OXYGEN SATURATION: 98 % | SYSTOLIC BLOOD PRESSURE: 93 MMHG | TEMPERATURE: 97.6 F | WEIGHT: 162 LBS | RESPIRATION RATE: 16 BRPM | DIASTOLIC BLOOD PRESSURE: 63 MMHG | BODY MASS INDEX: 23.99 KG/M2 | HEIGHT: 69 IN

## 2020-10-08 PROCEDURE — 6360000004 HC RX CONTRAST MEDICATION: Performed by: INTERNAL MEDICINE

## 2020-10-08 PROCEDURE — 36598 INJ W/FLUOR EVAL CV DEVICE: CPT

## 2020-10-08 PROCEDURE — 7100000010 HC PHASE II RECOVERY - FIRST 15 MIN

## 2020-10-08 PROCEDURE — 2709999900 HC NON-CHARGEABLE SUPPLY

## 2020-10-08 RX ADMIN — IOPAMIDOL 6 ML: 408 INJECTION, SOLUTION INTRATHECAL at 11:08

## 2020-10-08 ASSESSMENT — PAIN SCALES - GENERAL: PAINLEVEL_OUTOF10: 0

## 2020-10-08 NOTE — PROGRESS NOTES
The patient arrived to IR for medi-port check. The patient is A&OX4, verbalizes understanding of the procedure, prepped and draped on the table. 1100  Timeout complete  mediport flushed but would not aspirate blood. Dr Bard Ruano explained that the tip of the mediport wire was resting against thge vein and it would draw against it when attempting to aspirate, preventing the aspiration of blood. But it flushes fine and would work perfectly for immunotherapy.   1111  Procedure finished    Sterile dressing applied by RN  Report given to Kareem Gonzalez RN

## 2020-10-08 NOTE — PROGRESS NOTES
Discharge instructions given, then escorted to main entrance per wheelchair for discharge with nephew.

## 2020-10-13 ENCOUNTER — CLINICAL DOCUMENTATION (OUTPATIENT)
Dept: ONCOLOGY | Age: 65
End: 2020-10-13

## 2020-10-13 ENCOUNTER — HOSPITAL ENCOUNTER (OUTPATIENT)
Dept: INFUSION THERAPY | Age: 65
Discharge: HOME | End: 2020-10-13
Payer: COMMERCIAL

## 2020-10-13 VITALS
DIASTOLIC BLOOD PRESSURE: 79 MMHG | TEMPERATURE: 97.1 F | RESPIRATION RATE: 18 BRPM | SYSTOLIC BLOOD PRESSURE: 114 MMHG | HEART RATE: 114 BPM | BODY MASS INDEX: 22.66 KG/M2 | WEIGHT: 153 LBS | HEIGHT: 69 IN

## 2020-10-13 DIAGNOSIS — C79.31 SECONDARY MALIGNANT NEOPLASM OF BRAIN (HCC): ICD-10-CM

## 2020-10-13 DIAGNOSIS — C34.2 PRIMARY MALIGNANT NEOPLASM OF BRONCHUS OF RIGHT MIDDLE LOBE (HCC): ICD-10-CM

## 2020-10-13 DIAGNOSIS — Z21 ASYMPTOMATIC HIV INFECTION (HCC): Primary | ICD-10-CM

## 2020-10-13 LAB
ALBUMIN SERPL-MCNC: 3.8 GM/DL (ref 3.4–5)
ALP BLD-CCNC: 157 IU/L (ref 40–128)
ALT SERPL-CCNC: 6 U/L (ref 10–40)
ANION GAP SERPL CALCULATED.3IONS-SCNC: 18 MMOL/L (ref 4–16)
AST SERPL-CCNC: 14 IU/L (ref 15–37)
BASOPHILS ABSOLUTE: 0 K/CU MM
BASOPHILS RELATIVE PERCENT: 0.3 % (ref 0–1)
BILIRUB SERPL-MCNC: 0.5 MG/DL (ref 0–1)
BUN BLDV-MCNC: 10 MG/DL (ref 6–23)
CALCIUM SERPL-MCNC: 9.6 MG/DL (ref 8.3–10.6)
CHLORIDE BLD-SCNC: 92 MMOL/L (ref 99–110)
CO2: 24 MMOL/L (ref 21–32)
CREAT SERPL-MCNC: 1.3 MG/DL (ref 0.9–1.3)
DIFFERENTIAL TYPE: ABNORMAL
EOSINOPHILS ABSOLUTE: 0 K/CU MM
EOSINOPHILS RELATIVE PERCENT: 0.3 % (ref 0–3)
GFR AFRICAN AMERICAN: >60 ML/MIN/1.73M2
GFR AFRICAN AMERICAN: >60 ML/MIN/1.73M2
GFR NON-AFRICAN AMERICAN: 56 ML/MIN/1.73M2
GFR NON-AFRICAN AMERICAN: 58 ML/MIN/1.73M2
GLUCOSE BLD-MCNC: 110 MG/DL (ref 70–99)
GLUCOSE BLD-MCNC: 115 MG/DL (ref 70–99)
HCT VFR BLD CALC: 38.4 % (ref 42–52)
HEMOGLOBIN: 13.2 GM/DL (ref 13.5–18)
LYMPHOCYTES ABSOLUTE: 0.4 K/CU MM
LYMPHOCYTES RELATIVE PERCENT: 7 % (ref 24–44)
MCH RBC QN AUTO: 28.9 PG (ref 27–31)
MCHC RBC AUTO-ENTMCNC: 34.4 % (ref 32–36)
MCV RBC AUTO: 84 FL (ref 78–100)
MONOCYTES ABSOLUTE: 0.8 K/CU MM
MONOCYTES RELATIVE PERCENT: 13.3 % (ref 0–4)
PDW BLD-RTO: 18.7 % (ref 11.7–14.9)
PLATELET # BLD: 223 K/CU MM (ref 140–440)
PMV BLD AUTO: 8.3 FL (ref 7.5–11.1)
POC CALCIUM: 1.1 MMOL/L (ref 1.12–1.32)
POC CHLORIDE: 96 MMOL/L (ref 98–109)
POC CREATININE: 1.3 MG/DL (ref 0.9–1.3)
POTASSIUM SERPL-SCNC: 2.7 MMOL/L (ref 3.6–5.1)
POTASSIUM SERPL-SCNC: 3.4 MMOL/L (ref 3.5–5.1)
RBC # BLD: 4.57 M/CU MM (ref 4.6–6.2)
SEGMENTED NEUTROPHILS ABSOLUTE COUNT: 4.6 K/CU MM
SEGMENTED NEUTROPHILS RELATIVE PERCENT: 79.1 % (ref 36–66)
SODIUM BLD-SCNC: 133 MMOL/L (ref 136–145)
SODIUM BLD-SCNC: 134 MMOL/L (ref 135–145)
SOURCE, BLOOD GAS: ABNORMAL
TOTAL PROTEIN: 6.6 GM/DL (ref 6.4–8.2)
WBC # BLD: 5.9 K/CU MM (ref 4–10.5)

## 2020-10-13 PROCEDURE — 80053 COMPREHEN METABOLIC PANEL: CPT

## 2020-10-13 PROCEDURE — 6360000002 HC RX W HCPCS: Performed by: INTERNAL MEDICINE

## 2020-10-13 PROCEDURE — 2580000003 HC RX 258: Performed by: INTERNAL MEDICINE

## 2020-10-13 PROCEDURE — 36415 COLL VENOUS BLD VENIPUNCTURE: CPT

## 2020-10-13 PROCEDURE — 96375 TX/PRO/DX INJ NEW DRUG ADDON: CPT

## 2020-10-13 PROCEDURE — 96374 THER/PROPH/DIAG INJ IV PUSH: CPT

## 2020-10-13 PROCEDURE — 85025 COMPLETE CBC W/AUTO DIFF WBC: CPT

## 2020-10-13 RX ORDER — DEXAMETHASONE SODIUM PHOSPHATE 10 MG/ML
8 INJECTION, SOLUTION INTRAMUSCULAR; INTRAVENOUS ONCE
Status: CANCELLED
Start: 2020-10-13

## 2020-10-13 RX ORDER — HEPARIN SODIUM (PORCINE) LOCK FLUSH IV SOLN 100 UNIT/ML 100 UNIT/ML
500 SOLUTION INTRAVENOUS PRN
Status: CANCELLED | OUTPATIENT
Start: 2020-10-13

## 2020-10-13 RX ORDER — 0.9 % SODIUM CHLORIDE 0.9 %
1000 INTRAVENOUS SOLUTION INTRAVENOUS ONCE
Status: CANCELLED | OUTPATIENT
Start: 2020-10-13

## 2020-10-13 RX ORDER — ONDANSETRON 2 MG/ML
8 INJECTION INTRAMUSCULAR; INTRAVENOUS ONCE
Status: CANCELLED
Start: 2020-10-13

## 2020-10-13 RX ORDER — SODIUM CHLORIDE 0.9 % (FLUSH) 0.9 %
20 SYRINGE (ML) INJECTION PRN
Status: CANCELLED | OUTPATIENT
Start: 2020-10-13

## 2020-10-13 RX ORDER — DEXAMETHASONE SODIUM PHOSPHATE 4 MG/ML
8 INJECTION, SOLUTION INTRA-ARTICULAR; INTRALESIONAL; INTRAMUSCULAR; INTRAVENOUS; SOFT TISSUE ONCE
Status: COMPLETED | OUTPATIENT
Start: 2020-10-13 | End: 2020-10-13

## 2020-10-13 RX ORDER — HEPARIN SODIUM (PORCINE) LOCK FLUSH IV SOLN 100 UNIT/ML 100 UNIT/ML
500 SOLUTION INTRAVENOUS PRN
Status: DISCONTINUED | OUTPATIENT
Start: 2020-10-13 | End: 2020-10-14 | Stop reason: HOSPADM

## 2020-10-13 RX ORDER — 0.9 % SODIUM CHLORIDE 0.9 %
1000 INTRAVENOUS SOLUTION INTRAVENOUS ONCE
Status: COMPLETED | OUTPATIENT
Start: 2020-10-13 | End: 2020-10-13

## 2020-10-13 RX ORDER — ONDANSETRON 2 MG/ML
8 INJECTION INTRAMUSCULAR; INTRAVENOUS ONCE
Status: COMPLETED | OUTPATIENT
Start: 2020-10-13 | End: 2020-10-13

## 2020-10-13 RX ORDER — SODIUM CHLORIDE 0.9 % (FLUSH) 0.9 %
10 SYRINGE (ML) INJECTION PRN
Status: CANCELLED | OUTPATIENT
Start: 2020-10-13

## 2020-10-13 RX ADMIN — ONDANSETRON 8 MG: 2 INJECTION INTRAMUSCULAR; INTRAVENOUS at 10:12

## 2020-10-13 RX ADMIN — DEXAMETHASONE SODIUM PHOSPHATE 8 MG: 4 INJECTION, SOLUTION INTRAMUSCULAR; INTRAVENOUS at 10:15

## 2020-10-13 RX ADMIN — Medication 500 UNITS: at 11:38

## 2020-10-13 RX ADMIN — SODIUM CHLORIDE 1000 ML: 9 INJECTION, SOLUTION INTRAVENOUS at 10:15

## 2020-10-13 NOTE — PROGRESS NOTES
Patient called and reports that he feels weak and dehydrated. He states that he has not had any food for 3 days and had difficulty swallowing. States that he can only take sips of fluids. Reports hypotension B/P systolic 78 this am.  Patient reports that he has dry heaves at times and has no energy at all. Informed Dr Avril Mriamontes of symptoms and IV fluids ordered. Spoke to treatment suite and patient scheduled for IV fluids. Called patient and informed to come in. Patient states that he will not drive and will get a ride here. Patient confirmed plan of care.

## 2020-10-15 ENCOUNTER — HOSPITAL ENCOUNTER (OUTPATIENT)
Dept: INFUSION THERAPY | Age: 65
Discharge: HOME | End: 2020-10-15
Payer: COMMERCIAL

## 2020-10-15 ENCOUNTER — OFFICE VISIT (OUTPATIENT)
Dept: ONCOLOGY | Age: 65
End: 2020-10-15
Payer: COMMERCIAL

## 2020-10-15 VITALS
OXYGEN SATURATION: 98 % | TEMPERATURE: 97 F | RESPIRATION RATE: 18 BRPM | SYSTOLIC BLOOD PRESSURE: 94 MMHG | DIASTOLIC BLOOD PRESSURE: 72 MMHG | HEIGHT: 69 IN | HEART RATE: 114 BPM | BODY MASS INDEX: 22.59 KG/M2

## 2020-10-15 VITALS
SYSTOLIC BLOOD PRESSURE: 94 MMHG | HEIGHT: 69 IN | OXYGEN SATURATION: 98 % | TEMPERATURE: 97 F | WEIGHT: 153 LBS | RESPIRATION RATE: 16 BRPM | DIASTOLIC BLOOD PRESSURE: 72 MMHG | HEART RATE: 114 BPM | BODY MASS INDEX: 22.66 KG/M2

## 2020-10-15 DIAGNOSIS — C34.2 PRIMARY MALIGNANT NEOPLASM OF BRONCHUS OF RIGHT MIDDLE LOBE (HCC): Primary | ICD-10-CM

## 2020-10-15 PROCEDURE — 2580000003 HC RX 258: Performed by: INTERNAL MEDICINE

## 2020-10-15 PROCEDURE — 96374 THER/PROPH/DIAG INJ IV PUSH: CPT

## 2020-10-15 PROCEDURE — 96413 CHEMO IV INFUSION 1 HR: CPT

## 2020-10-15 PROCEDURE — 96361 HYDRATE IV INFUSION ADD-ON: CPT

## 2020-10-15 PROCEDURE — 6360000002 HC RX W HCPCS: Performed by: INTERNAL MEDICINE

## 2020-10-15 PROCEDURE — 96367 TX/PROPH/DG ADDL SEQ IV INF: CPT

## 2020-10-15 PROCEDURE — 96417 CHEMO IV INFUS EACH ADDL SEQ: CPT

## 2020-10-15 PROCEDURE — 2500000003 HC RX 250 WO HCPCS: Performed by: INTERNAL MEDICINE

## 2020-10-15 PROCEDURE — 99214 OFFICE O/P EST MOD 30 MIN: CPT | Performed by: NURSE PRACTITIONER

## 2020-10-15 PROCEDURE — 96375 TX/PRO/DX INJ NEW DRUG ADDON: CPT

## 2020-10-15 PROCEDURE — 96366 THER/PROPH/DIAG IV INF ADDON: CPT

## 2020-10-15 RX ORDER — HEPARIN SODIUM (PORCINE) LOCK FLUSH IV SOLN 100 UNIT/ML 100 UNIT/ML
500 SOLUTION INTRAVENOUS PRN
Status: DISCONTINUED | OUTPATIENT
Start: 2020-10-15 | End: 2020-10-16 | Stop reason: HOSPADM

## 2020-10-15 RX ORDER — SODIUM CHLORIDE 0.9 % (FLUSH) 0.9 %
10 SYRINGE (ML) INJECTION PRN
Status: DISCONTINUED | OUTPATIENT
Start: 2020-10-15 | End: 2020-10-16 | Stop reason: HOSPADM

## 2020-10-15 RX ORDER — 0.9 % SODIUM CHLORIDE 0.9 %
1000 INTRAVENOUS SOLUTION INTRAVENOUS ONCE
Status: CANCELLED | OUTPATIENT
Start: 2020-10-15

## 2020-10-15 RX ORDER — ONDANSETRON 2 MG/ML
8 INJECTION INTRAMUSCULAR; INTRAVENOUS ONCE
Status: COMPLETED | OUTPATIENT
Start: 2020-10-15 | End: 2020-10-15

## 2020-10-15 RX ORDER — DEXAMETHASONE SODIUM PHOSPHATE 10 MG/ML
8 INJECTION, SOLUTION INTRAMUSCULAR; INTRAVENOUS ONCE
Status: CANCELLED
Start: 2020-10-15

## 2020-10-15 RX ORDER — DEXAMETHASONE SODIUM PHOSPHATE 4 MG/ML
8 INJECTION, SOLUTION INTRA-ARTICULAR; INTRALESIONAL; INTRAMUSCULAR; INTRAVENOUS; SOFT TISSUE ONCE
Status: COMPLETED | OUTPATIENT
Start: 2020-10-15 | End: 2020-10-15

## 2020-10-15 RX ORDER — ONDANSETRON 2 MG/ML
8 INJECTION INTRAMUSCULAR; INTRAVENOUS ONCE
Status: CANCELLED
Start: 2020-10-15

## 2020-10-15 RX ORDER — 0.9 % SODIUM CHLORIDE 0.9 %
1000 INTRAVENOUS SOLUTION INTRAVENOUS ONCE
Status: COMPLETED | OUTPATIENT
Start: 2020-10-15 | End: 2020-10-15

## 2020-10-15 RX ADMIN — SODIUM CHLORIDE 1000 ML: 9 INJECTION, SOLUTION INTRAVENOUS at 14:12

## 2020-10-15 RX ADMIN — FAMOTIDINE 20 MG: 10 INJECTION, SOLUTION INTRAVENOUS at 14:13

## 2020-10-15 RX ADMIN — ONDANSETRON 8 MG: 2 INJECTION INTRAMUSCULAR; INTRAVENOUS at 14:12

## 2020-10-15 RX ADMIN — DEXAMETHASONE SODIUM PHOSPHATE 8 MG: 4 INJECTION, SOLUTION INTRAMUSCULAR; INTRAVENOUS at 14:13

## 2020-10-15 RX ADMIN — SODIUM CHLORIDE 200 MG: 9 INJECTION, SOLUTION INTRAVENOUS at 14:24

## 2020-10-15 ASSESSMENT — PATIENT HEALTH QUESTIONNAIRE - PHQ9
SUM OF ALL RESPONSES TO PHQ9 QUESTIONS 1 & 2: 0
1. LITTLE INTEREST OR PLEASURE IN DOING THINGS: 0
2. FEELING DOWN, DEPRESSED OR HOPELESS: 0
SUM OF ALL RESPONSES TO PHQ QUESTIONS 1-9: 0

## 2020-10-15 NOTE — PROGRESS NOTES
1330: Pt brought back to the treatment room while I was away from the unit. When I returned at [phone], the pt was in a recliner chair in our tx room. Pt. is A&OX3. He reports nausea today, states he has abdominal pain, hx of constipation, takes stool softners as needed. He reports he took them and had a bowel movement today. He reports decreased appetite and not eating/drinking anything yesterday. He reports he could benefit from hydration today. I spoke to Dr. Lolita Francis, and an order for 1 liter NS, Pepcid 20mg, Zofran 8mg and Dexamethasone obtained. Pt agreed to get this with his Keytruda. Per MD, pt may be experiencing esophogitis s/p radiation therapy. Potassium 3.4 when sent to the hospital for processing earlier this week     Left chest wall port, accessed with a 20 gauge 3/4 inch Barfield needle, no blood return. Pt said he had a dye study done and that it is okay to still use despite no blood return. Pt starting Keytruda C1 today also. He reports his nephew will pick him up post tx    He reports he uses a wheelchair in office and a cane at home. He reports he does not have the energy to walk long distances by himself. He reports that he is finished with radiation therapy and that he was started on bactrim medication for his low CD4 counts. Pt did tell me that he is HIV + also before I accessed his port. Pt scheduled tto see Darlin Burns today. AVS report provided and a copy of his recent labs    Per Dr. Lolita Francis, repeat labs today are not needed. Patient's status assessed and documented appropriately. All labs and required results were also reviewed today. Treatment parameters have been reviewed. Today's treatment has been approved by the provider. Treatment orders and medication sequencing (when applicable) was verified by 2 registered nurses.   The treatment plan was confirmed with the patient prior to administration, and the patient understands the need to report any treatment-related symptoms. Prior to administration, when applicable, the following 8 elements of medication administration were reviewed with 2nd Registered Nurse prior to dosing: drug name, drug dose, infusion volume when prepared in a syringe, rate of administration, expiration dates and/or times, appearance and integrity of drug(s), and rate of pump for infusion. The 5 rights of medication administration have been verified.

## 2020-10-15 NOTE — PROGRESS NOTES
Patient Name: Clyde Plummer  Patient : 1955  Patient MRN: [id number]     Primary Oncologist: Tree Richards MD  Referring Provider: Rubia Barone MD     Date of Service: 10/15/2020      Chief Complaint:   Chief Complaint   Patient presents with   [address]     Patient Active Problem List:     Secondary malignant neoplasm of brain      Primary malignant neoplasm of bronchus of right middle lobe      Adenocarcinoma of lung, stage 4, right      HPI:   Lauren Brasher is a 40-year-old very pleasant gentleman with medical history significant for HIV/AIDS, COPD and GERD referred to me on 2020 for evaluation of his metastatic lung cancer. He initially presented to [facility] with seizure on 2020. CT scan of the head showed vasogenic edema in left parietal lobe concerning for underlying mass. CT scan of the chest showed postobstructive collapse/consolidation of the right middle lobe, endobronchial lesion cannot be excluded and 16 mm left upper lobe nodule. He was then transferred to [facility] ED for further workup and management. MRI of the brain with and without contrast done on 20 showed 1.2 cm mass in left parietal lobe with surrounding vasogenic edema, regional mass effect and effacement of atrium of left lateral ventricle. He underwent bronchoscopy, EBUS on 6/15/20 and it showed collapse of RML lateral segment bronchus. Final pathology from EBUS guided biopsy of subcarinal LN was consistent with  lung adenocarcinoma. PD-L1 was positive (95%). He then underwent resection of the left parietal lobe mass on 20 and final pathology was consistent with adenocarcinoma of lung primary. Molecular testing revealed that MET amplification was detected. MRI of the brain done on 20 showed postsurgical changes related to mass resection in the left parietal lobe.  There is small rim of contrast enhancement along the resection cavity margin which may be treatment related, and attention on follow-up imaging is recommended. Surrounding vasogenic edema and mass effect have improved. New tiny focus of restricted diffusion in the left occipital lobe which could be a recent infarct (acute to subacute). No hemorrhage or mass effect associated with this. He was subsequently referred to me for further evaluation. He was seen by radiation oncologist at [address] [place] [address] and he received SBRT to his brain surgical site between 7/15 and 7/17/2020. PET/CT scan done on July 9, 2020 showed Intense metabolic activity associated with the masslike opacification of the right middle lobe which may represent primary lung cancer and/or postobstructive pneumonia. Metabolically active right hilar and subcarinal lymph node metastasis. Solitary metabolically active skeletal metastasis in the left aspect of the sacrum. Definitive concurrent chemoradiation therapy was started since 8/18/2020 (started RT on 8/18/20 and chemo on 8/19/20) and he completed his last RT on 9/29/20. On October 15, 2020, he presented to me for follow-up. I have been following him for metastatic non small cell lung cancer. He is s/p surgery followed by SBRT to his solitary brain metastasis. He is also status post definitive concurrent chemoradiation therapy to his lung lesions. He only has oligo metastasis. We will consider to give consolidation RT to his left sacrum. I recommend him to follow up with Dr. Marcell Hudson for that. Since he has completed definitive concurrent chemoradaition therapy, I recommend him to start first line chemotherapy with pembrolizumab starting from 10/15/20. He presented on 10/15/2020 for treatment and supportive care. He was seen in office on 10/13/2020 for treatment with IVF and antiemetics. He reports that he is having trouble eating due to nausea and discomfort in his esophagus. He stopped taking magic mouthwash last week. He is instructed to resume use four times per day.  He is asked to use omeprazole daily for GERD symptoms. He is told he can add Mylanta as needed. He is asked to use zofran prior to eating and to use regularly until we see him again. He reports new severe back pain without trauma for which we will obtain MRI. I will continue to follow him closely during immunotherapy. He is feeling better after IV hydration therapy. I am concerned about his ongoing symptoms and will see him in office early next week. Past Medical History  Significant for  1. HIV/AIDS since 2005  2. COPD  3. GERD  4. 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 Right inguinal hernia repair   2. Tonsillectomy in 1960s  3. Left wrist fracture surgery in 2/28/2020  4. Brain metastasis removal on June 2020    Allergies  No known medication allergies    Social History  He is a former smoker and he quit smoking in 2005. He used to smoke 1 pack per day for about 32 years. He is currently living in [place]. Family History  Significant for breast cancer in his mother, melanoma in his maternal uncle, lung cancer in his maternal grandmother and leukemia in his maternal grandfather. Review of Systems: \"Per interval history; otherwise 10 point ROS is negative. \"  Fatigue and weakness have worsened. He has nausea with inability to eat. Reports he takes a few bites and feels nauseated with emesis. He denies abdominal pain but has esophageal discomfort and heartburn. Reports new onset of back pain 20/10. No loss of bowel, bladder function, no saddle anesthesia, no changes in sensation. He has not been using pain medication regularly. Bowels move between constipation and diarrhea. He denies easy bleeding or bruising. Continues to have pain in his feet. He does not use pain medication regularly.     Vital Signs:  BP 94/72 (Site: Right Upper Arm, Position: Sitting, Cuff Size: Medium Adult)   Pulse 114   Temp 97 °F (36.1 °C) (Infrared)   Resp 16   Ht 5' 9\" (1.753 m)   Wt 153 lb (69.4 kg) SpO2 98%   BMI 22.59 kg/m²     Physical Exam:  CONSTITUTIONAL: awake, alert, cooperative, no apparent distress   EYES:sclera clear and conjunctiva normal  ENT: Normocephalic, without obvious abnormality, atraumatic  NECK: supple, symmetrical,  HEMATOLOGIC/LYMPHATIC: no cervical, supraclavicular  lymphadenopathy   LUNGS: no increased work of breathing and clear to auscultation   CARDIOVASCULAR: regular rate and rhythm, normal S1 and S2, no murmur noted  ABDOMEN: normal bowel sounds x 4, soft, non-distended, non-tender  MUSCULOSKELETAL: full range of motion noted, tone is normal  NEUROLOGIC: awake, alert, oriented to name, place and time. Motor skills grossly intact. SKIN: Normal skin color, texture, turgor and no jaundice.  appears intact   EXTREMITIES: no clubbing, no leg swelling, no LE edema, no cyanosis   BACK- point tenderness to area around T5-T6  Labs:  Hematology:  Lab Results   Component Value Date    WBC 5.9 10/13/2020    RBC 4.57 (L) 10/13/2020    HGB 13.2 (L) 10/13/2020    HCT 38.4 (L) 10/13/2020    MCV 84.0 10/13/2020    MCH 28.9 10/13/2020    MCHC 34.4 10/13/2020    RDW 18.7 (H) 10/13/2020     10/13/2020    MPV 8.3 10/13/2020    SEGSPCT 79.1 (H) 10/13/2020    EOSRELPCT 0.3 10/13/2020    BASOPCT 0.3 10/13/2020    LYMPHOPCT 7.0 (L) 10/13/2020    MONOPCT 13.3 (H) 10/13/2020    SEGSABS 4.6 10/13/2020    EOSABS 0.0 10/13/2020    BASOSABS 0.0 10/13/2020    LYMPHSABS 0.4 10/13/2020    MONOSABS 0.8 10/13/2020    DIFFTYPE AUTOMATED DIFFERENTIAL 10/13/2020     No results found for: ESR  Chemistry:  Lab Results   Component Value Date     (L) 10/13/2020    K 2.7 (L) 10/13/2020    CL 92 (L) 10/13/2020    CO2 24 10/13/2020    BUN 10 10/13/2020    CREATININE 1.3 10/13/2020    GLUCOSE 110 (H) 10/13/2020    CALCIUM 9.6 10/13/2020    PROT 6.6 10/13/2020    LABALBU 3.8 10/13/2020    BILITOT 0.5 10/13/2020    ALKPHOS 157 (H) 10/13/2020    AST 14 (L) 10/13/2020    ALT 6 (L) 10/13/2020    LABGLOM 58 (L) 10/13/2020    GFRAA >60 10/13/2020    AGRATIO 1.2 09/21/2020    GLOB 2.9 09/21/2020    PHOS 3.7 08/31/2016    MG 2.5 (H) 06/13/2020    POCCA 1.10 (L) 10/13/2020    POCGLU 115 (H) 10/13/2020     No results found for: MMA, LDH, HOMOCYSTEINE  No components found for: LD  Lab Results   Component Value Date    TSHHS 1.720 05/09/2017    T4FREE 1.47 08/29/2016     Immunology:  Lab Results   Component Value Date    PROT 6.6 10/13/2020     No results found for: Lj Hannah, KLFLCR  No results found for: B2M  Coagulation Panel:  Lab Results   Component Value Date    PROTIME 11.0 04/23/2013    INR 1.01 04/23/2013    APTT 29.1 06/05/2012    DDIMER 297 (H) 09/20/2015     Anemia Panel:  Lab Results   Component Value Date    DVTFQGEG86 388.0 08/29/2016    FOLATE 8.8 08/29/2016     Tumor Markers:  Lab Results   Component Value Date    CEA 3.4 08/30/2016     17.7 08/30/2016    PSA 2.03 03/13/2013     Observations:  PHQ-9 Total Score: 0 (10/15/2020  2:30 PM)        Assessment & Plan:   Metastatic non-small cell lung cancer - adenocarcinoma  Brain metastasis - s/p excision    PLAN  Dior Broussard is a 75-year-old very pleasant gentleman who was found to have right middle lobe lung mass with left parietal lobe brain metastasis when he presented with seizure on June 13, 2020. Further work ups with bronchoscopy, EBUS on 6/15/20 showed collapse of RML lateral segment bronchus. Final pathology from EBUS guided biopsy of subcarinal lymph node was consistent with  lung adenocarcinoma. PD-L1 was positive (95%). He was subsequently referred to me for further evaluation. He was seen by radiation oncologist at [facility] and he received SBRT to his brain surgical site between 7/15 and 7/17/2020. PET/CT scan done on July 9, 2020 showed Intense metabolic activity associated with the masslike opacification of the right middle lobe which may represent primary lung cancer and/or postobstructive pneumonia.   Metabolically active right hilar and subcarinal lymph node metastasis. Solitary metabolically active skeletal metastasis in the left aspect of the sacrum. Definitive concurrent chemoradiation therapy was started since 8/18/2020 (started RT on 8/18/20 and chemo on 8/19/20) and he completed his last RT on 9/29/20. On October 15, 2020, he presented to me for follow-up. I have been following him for metastatic non small cell lung cancer. He is s/p surgery followed by SBRT to his solitary brain metastasis. He is also status post definitive concurrent chemoradiation therapy to his lung lesions. He only has oligo metastasis. We will consider to give consolidation RT to his left sacrum. I recommend him to follow up with Dr. Patrice Haywood for that. Since he has completed definitive concurrent chemoradaition therapy, I recommend him to start first line chemotherapy with pembrolizumab starting from 10/15/20. He presented on 10/15/2020 for treatment and supportive care. He was seen in office on 10/13/2020 for treatment with IVF and antiemetics. He reports that he is having trouble eating due to nausea and discomfort in his esophagus. He stopped taking magic mouthwash last week. He is instructed to resume use four times per day. He is asked to use omeprazole daily for GERD symptoms. He is told he can add Mylanta as needed. He is asked to use zofran prior to eating and to use regularly until we see him again. He reports new severe back pain without trauma for which we will obtain MRI. I will continue to follow him closely during immunotherapy. He is feeling better after IV hydration therapy. I am concerned about his ongoing symptoms and will see him in office early next week. I answered all his questions and concerns for today. I asked him to follow up with her primary care physician on regular basis. I will continue to keep you updated on his progress.      He is asked to call on call phone for worsening or uncontrolled symptoms, or to ER for severe symptoms. Thank you for allowing me to participate in the care of this very pleasant patient. Recent imaging and labs were reviewed and discussed with the patient.

## 2020-10-16 ENCOUNTER — TELEPHONE (OUTPATIENT)
Dept: ONCOLOGY | Age: 65
End: 2020-10-16

## 2020-10-16 ENCOUNTER — HOSPITAL ENCOUNTER (OUTPATIENT)
Dept: MRI IMAGING | Age: 65
Discharge: HOME | End: 2020-10-16
Payer: COMMERCIAL

## 2020-10-16 PROCEDURE — A9577 INJ MULTIHANCE: HCPCS | Performed by: NURSE PRACTITIONER

## 2020-10-16 PROCEDURE — 6360000004 HC RX CONTRAST MEDICATION: Performed by: NURSE PRACTITIONER

## 2020-10-16 PROCEDURE — 72157 MRI CHEST SPINE W/O & W/DYE: CPT

## 2020-10-16 RX ORDER — HYDROCODONE BITARTRATE AND ACETAMINOPHEN 5; 325 MG/1; MG/1
1 TABLET ORAL EVERY 6 HOURS PRN
Qty: 56 TABLET | Refills: 0 | Status: SHIPPED | OUTPATIENT
Start: 2020-10-16 | End: 2020-11-05 | Stop reason: SDUPTHER

## 2020-10-16 RX ADMIN — GADOBENATE DIMEGLUMINE 7 ML: 529 INJECTION, SOLUTION INTRAVENOUS at 10:21

## 2020-10-16 NOTE — TELEPHONE ENCOUNTER
Called and reviewed MRI results. Pt instructed not to life anything more than 10 pounds. He has no new neuro symptoms. Pain is not well controlled.  Will start norco.

## 2020-10-20 ENCOUNTER — OFFICE VISIT (OUTPATIENT)
Dept: ONCOLOGY | Age: 65
End: 2020-10-20
Payer: COMMERCIAL

## 2020-10-20 ENCOUNTER — HOSPITAL ENCOUNTER (OUTPATIENT)
Dept: INFUSION THERAPY | Age: 65
Discharge: HOME | End: 2020-10-20
Payer: COMMERCIAL

## 2020-10-20 VITALS
DIASTOLIC BLOOD PRESSURE: 65 MMHG | TEMPERATURE: 97.3 F | BODY MASS INDEX: 24.07 KG/M2 | OXYGEN SATURATION: 100 % | WEIGHT: 163 LBS | HEART RATE: 108 BPM | SYSTOLIC BLOOD PRESSURE: 116 MMHG

## 2020-10-20 VITALS
HEIGHT: 69 IN | HEART RATE: 108 BPM | TEMPERATURE: 97.3 F | OXYGEN SATURATION: 100 % | BODY MASS INDEX: 24.14 KG/M2 | DIASTOLIC BLOOD PRESSURE: 65 MMHG | WEIGHT: 163 LBS | SYSTOLIC BLOOD PRESSURE: 116 MMHG

## 2020-10-20 DIAGNOSIS — C34.2 PRIMARY MALIGNANT NEOPLASM OF BRONCHUS OF RIGHT MIDDLE LOBE (HCC): Primary | ICD-10-CM

## 2020-10-20 PROCEDURE — 2580000003 HC RX 258: Performed by: INTERNAL MEDICINE

## 2020-10-20 PROCEDURE — 96360 HYDRATION IV INFUSION INIT: CPT

## 2020-10-20 PROCEDURE — 96375 TX/PRO/DX INJ NEW DRUG ADDON: CPT

## 2020-10-20 PROCEDURE — 96374 THER/PROPH/DIAG INJ IV PUSH: CPT

## 2020-10-20 PROCEDURE — 6360000002 HC RX W HCPCS: Performed by: INTERNAL MEDICINE

## 2020-10-20 PROCEDURE — 96361 HYDRATE IV INFUSION ADD-ON: CPT

## 2020-10-20 PROCEDURE — 99214 OFFICE O/P EST MOD 30 MIN: CPT | Performed by: NURSE PRACTITIONER

## 2020-10-20 PROCEDURE — 6360000002 HC RX W HCPCS

## 2020-10-20 RX ORDER — 0.9 % SODIUM CHLORIDE 0.9 %
1000 INTRAVENOUS SOLUTION INTRAVENOUS ONCE
Status: CANCELLED | OUTPATIENT
Start: 2020-10-20

## 2020-10-20 RX ORDER — ONDANSETRON 2 MG/ML
8 INJECTION INTRAMUSCULAR; INTRAVENOUS ONCE
Status: COMPLETED | OUTPATIENT
Start: 2020-10-20 | End: 2020-10-20

## 2020-10-20 RX ORDER — DEXAMETHASONE SODIUM PHOSPHATE 10 MG/ML
8 INJECTION, SOLUTION INTRAMUSCULAR; INTRAVENOUS ONCE
Status: CANCELLED
Start: 2020-10-20

## 2020-10-20 RX ORDER — DEXAMETHASONE SODIUM PHOSPHATE 4 MG/ML
8 INJECTION, SOLUTION INTRA-ARTICULAR; INTRALESIONAL; INTRAMUSCULAR; INTRAVENOUS; SOFT TISSUE ONCE
Status: COMPLETED | OUTPATIENT
Start: 2020-10-20 | End: 2020-10-20

## 2020-10-20 RX ORDER — HEPARIN SODIUM (PORCINE) LOCK FLUSH IV SOLN 100 UNIT/ML 100 UNIT/ML
SOLUTION INTRAVENOUS
Status: COMPLETED
Start: 2020-10-20 | End: 2020-10-20

## 2020-10-20 RX ORDER — ONDANSETRON 2 MG/ML
8 INJECTION INTRAMUSCULAR; INTRAVENOUS ONCE
Status: CANCELLED
Start: 2020-10-20

## 2020-10-20 RX ORDER — 0.9 % SODIUM CHLORIDE 0.9 %
1000 INTRAVENOUS SOLUTION INTRAVENOUS ONCE
Status: COMPLETED | OUTPATIENT
Start: 2020-10-20 | End: 2020-10-20

## 2020-10-20 RX ORDER — OXYCODONE AND ACETAMINOPHEN 7.5; 325 MG/1; MG/1
1 TABLET ORAL EVERY 4 HOURS PRN
Qty: 48 TABLET | Refills: 0 | Status: SHIPPED | OUTPATIENT
Start: 2020-10-20 | End: 2020-10-27

## 2020-10-20 RX ADMIN — DEXAMETHASONE SODIUM PHOSPHATE 8 MG: 4 INJECTION, SOLUTION INTRAMUSCULAR; INTRAVENOUS at 14:04

## 2020-10-20 RX ADMIN — Medication: at 15:37

## 2020-10-20 RX ADMIN — SODIUM CHLORIDE 1000 ML: 9 INJECTION, SOLUTION INTRAVENOUS at 14:04

## 2020-10-20 RX ADMIN — ONDANSETRON 8 MG: 2 INJECTION INTRAMUSCULAR; INTRAVENOUS at 14:04

## 2020-10-20 ASSESSMENT — PAIN SCALES - GENERAL: PAINLEVEL_OUTOF10: 0

## 2020-10-20 NOTE — PROGRESS NOTES
Pt. Here for fluids and office visit. Pt. Feeling much better than last visit. Pt. Discussed pain issues during office visit. Pt. Verbalized he Has been taking nausea medication around the clock this weekend. Stated this helped and he has been able to eat a little. Treatment administered as ordered, pt. Tolerated well.

## 2020-10-20 NOTE — PROGRESS NOTES
MA Rooming Questions  Patient: Adin Caal  MRN: [id number]    Date: 10/20/2020        1. Do you have any new issues? yes - pain medication not working         2. Do you need any refills on medications? yes - pain med    3. Have you had any imaging done since your last visit? yes    4. Have you been hospitalized or seen in the emergency room since your last visit here?   no    5. Did the patient have a depression screening completed today?  No    No data recorded     PHQ-9 Given to (if applicable):               PHQ-9 Score (if applicable):                     [] Positive     []  Negative              Does question #9 need addressed (if applicable)                     [] Yes    []  No               Mariangel Herman MA

## 2020-10-20 NOTE — PROGRESS NOTES
Patient Name: Clarissa Ferrera  Patient : 1955  Patient MRN: [id number]     Primary Oncologist: Carlos Fatima MD  Referring Provider: Samina Barger MD     Date of Service: 10/20/2020      Chief Complaint:   No chief complaint on file. Patient Active Problem List:     Secondary malignant neoplasm of brain      Primary malignant neoplasm of bronchus of right middle lobe      Adenocarcinoma of lung, stage 4, right      HPI:   Yudy Reid is a 51-year-old very pleasant gentleman with medical history significant for HIV/AIDS, COPD and GERD referred to me on 2020 for evaluation of his metastatic lung cancer. He initially presented to [facility] with seizure on 2020. CT scan of the head showed vasogenic edema in left parietal lobe concerning for underlying mass. CT scan of the chest showed postobstructive collapse/consolidation of the right middle lobe, endobronchial lesion cannot be excluded and 16 mm left upper lobe nodule. He was then transferred to [facility] ED for further workup and management. MRI of the brain with and without contrast done on 20 showed 1.2 cm mass in left parietal lobe with surrounding vasogenic edema, regional mass effect and effacement of atrium of left lateral ventricle. He underwent bronchoscopy, EBUS on 6/15/20 and it showed collapse of RML lateral segment bronchus. Final pathology from EBUS guided biopsy of subcarinal LN was consistent with  lung adenocarcinoma. PD-L1 was positive (95%). He then underwent resection of the left parietal lobe mass on 20 and final pathology was consistent with adenocarcinoma of lung primary. Molecular testing revealed that MET amplification was detected. MRI of the brain done on 20 showed postsurgical changes related to mass resection in the left parietal lobe.  There is small rim of contrast enhancement along the resection cavity margin which may be treatment related, and attention on follow-up imaging is recommended. Surrounding vasogenic edema and mass effect have improved. New tiny focus of restricted diffusion in the left occipital lobe which could be a recent infarct (acute to subacute). No hemorrhage or mass effect associated with this. He was subsequently referred to me for further evaluation. He was seen by radiation oncologist at [facility] and he received SBRT to his brain surgical site between 7/15 and 7/17/2020. PET/CT scan done on July 9, 2020 showed Intense metabolic activity associated with the masslike opacification of the right middle lobe which may represent primary lung cancer and/or postobstructive pneumonia. Metabolically active right hilar and subcarinal lymph node metastasis. Solitary metabolically active skeletal metastasis in the left aspect of the sacrum. Definitive concurrent chemoradiation therapy was started since 8/18/2020 (started RT on 8/18/20 and chemo on 8/19/20) and he completed his last RT on 9/29/20. On October 15, 2020, he presented to me for follow-up. I have been following him for metastatic non small cell lung cancer. He is s/p surgery followed by SBRT to his solitary brain metastasis. He is also status post definitive concurrent chemoradiation therapy to his lung lesions. He only has oligo metastasis. We will consider to give consolidation RT to his left sacrum. I recommend him to follow up with Dr. Vi Robles for that. Since he has completed definitive concurrent chemoradaition therapy, I recommend him to start first line chemotherapy with pembrolizumab starting from 10/15/20. He presented on 10/15/2020 for treatment and supportive care. He was seen in office on 10/13/2020 for treatment with IVF and antiemetics. He reports that he is having trouble eating due to nausea and discomfort in his esophagus. He stopped taking magic mouthwash last week. He is instructed to resume use four times per day.  He is asked to use omeprazole daily for GERD symptoms. He is told he can add Mylanta as needed. He is asked to use zofran prior to eating and to use regularly until we see him again. He reports new severe back pain without trauma for which we will obtain MRI. Presented on 10/20/2020 for scheduled follow up and supportive care. 10/16/2020 MRI spine revealed:   Impression    1. Thoracic spine metastatic disease.  No pathologic compression fracture. 2. Left paraspinal soft tissue enhancing mass is suspicious for soft tissue    metastasis. 3. Right thyroid lobe nodule measures 2.3 cm.  Nonemergent thyroid ultrasound    suggested. The findings were sent to the Radiology Results [address] at 11:08    am on 10/16/2020to be communicated to a licensed caregiver.         RECOMMENDATIONS:    2.3 cm incidental thyroid nodule. Recommend thyroid US. He was given norco for pain relief last week. This was ineffective and he called on call phone and was given 10/325 [address] [facility] which was also ineffective. He is given Percoet 7.5/325 q 4 hours prn. He will keep his regularly scheduled appt with Dr. Lizbeth French. I did review case with Dr. Lizbeth French and she will attempt to see him sooner if able. We discussed bowel regimen. He has less nausea taking zofran regularly. He is using magic mouthwash with relief of burning symptoms with swallowing. He is also using prilosec daily. Energy is improved and he has been able to eat. I will continue to follow him closely during immunotherapy. He is feeling better after IV hydration therapy. Past Medical History  Significant for  1. HIV/AIDS since 2005  2. COPD  3. GERD  4. 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 Right inguinal hernia repair   2. Tonsillectomy in 1960s  3. Left wrist fracture surgery in 2/28/2020  4. Brain metastasis removal on June 2020    Allergies  No known medication allergies    Social History  He is a former smoker and he quit smoking in 2005.  He used to smoke 1 gentleman who was found to have right middle lobe lung mass with left parietal lobe brain metastasis when he presented with seizure on June 13, 2020. Further work ups with bronchoscopy, EBUS on 6/15/20 showed collapse of RML lateral segment bronchus. Final pathology from EBUS guided biopsy of subcarinal lymph node was consistent with  lung adenocarcinoma. PD-L1 was positive (95%). He was subsequently referred to me for further evaluation. He was seen by radiation oncologist at [facility] and he received SBRT to his brain surgical site between 7/15 and 7/17/2020. PET/CT scan done on July 9, 2020 showed Intense metabolic activity associated with the masslike opacification of the right middle lobe which may represent primary lung cancer and/or postobstructive pneumonia. Metabolically active right hilar and subcarinal lymph node metastasis. Solitary metabolically active skeletal metastasis in the left aspect of the sacrum. Definitive concurrent chemoradiation therapy was started since 8/18/2020 (started RT on 8/18/20 and chemo on 8/19/20) and he completed his last RT on 9/29/20. On October 15, 2020, he presented to me for follow-up. I have been following him for metastatic non small cell lung cancer. He is s/p surgery followed by SBRT to his solitary brain metastasis. He is also status post definitive concurrent chemoradiation therapy to his lung lesions. He only has oligo metastasis. We will consider to give consolidation RT to his left sacrum. I recommend him to follow up with Dr. Ole Weber for that. Since he has completed definitive concurrent chemoradaition therapy, I recommend him to start first line chemotherapy with pembrolizumab starting from 10/15/20. He presented on 10/20/2020 for scheduled follow up. He was given norco for pain relief last week. This was ineffective and he called on call phone and was given 10/325 Rodriguez Nirali which was also ineffective.  He is given Percoet 7.5/325 q 4 hours prn. He will keep his regularly scheduled appt with Dr. Lizbeth French. I did review case with Dr. Lizbeth French and she will attempt to see him sooner if able. We discussed bowel regimen. He has less nausea taking zofran regularly. He is using magic mouthwash with relief of burning symptoms with swallowing. He is also using prilosec daily. Energy is improved and he has been able to eat. I will continue to follow him closely during immunotherapy. He is feeling better after IV hydration therapy. I will continue to follow him closely during immunotherapy. I am concerned about his ongoing symptoms and he is asked to RTC prior to next OV if symptoms worsen or are uncontrolled. I answered all his questions and concerns for today. I asked him to follow up with her primary care physician on regular basis. I will continue to keep you updated on his progress. He is asked to call on call phone for worsening or uncontrolled symptoms, or to ER for severe symptoms. Thank you for allowing me to participate in the care of this very pleasant patient. Recent imaging and labs were reviewed and discussed with the patient.

## 2020-10-21 ENCOUNTER — HOSPITAL ENCOUNTER (OUTPATIENT)
Dept: RADIATION ONCOLOGY | Age: 65
Discharge: HOME | End: 2020-10-21
Attending: RADIOLOGY
Payer: COMMERCIAL

## 2020-10-21 ENCOUNTER — TELEPHONE (OUTPATIENT)
Dept: RADIATION ONCOLOGY | Age: 65
End: 2020-10-21

## 2020-10-21 VITALS
BODY MASS INDEX: 24.82 KG/M2 | DIASTOLIC BLOOD PRESSURE: 66 MMHG | RESPIRATION RATE: 17 BRPM | WEIGHT: 167.6 LBS | OXYGEN SATURATION: 100 % | HEIGHT: 69 IN | HEART RATE: 103 BPM | SYSTOLIC BLOOD PRESSURE: 110 MMHG | TEMPERATURE: 97.3 F

## 2020-10-21 PROCEDURE — 99214 OFFICE O/P EST MOD 30 MIN: CPT | Performed by: RADIOLOGY

## 2020-10-21 PROCEDURE — 99212 OFFICE O/P EST SF 10 MIN: CPT

## 2020-10-21 ASSESSMENT — PAIN DESCRIPTION - DESCRIPTORS: DESCRIPTORS: CONSTANT;SHARP;ACHING

## 2020-10-21 ASSESSMENT — PAIN DESCRIPTION - ONSET: ONSET: ON-GOING

## 2020-10-21 ASSESSMENT — PAIN DESCRIPTION - PROGRESSION: CLINICAL_PROGRESSION: GRADUALLY WORSENING

## 2020-10-21 ASSESSMENT — PAIN DESCRIPTION - PAIN TYPE: TYPE: ACUTE PAIN

## 2020-10-21 ASSESSMENT — PAIN - FUNCTIONAL ASSESSMENT: PAIN_FUNCTIONAL_ASSESSMENT: PREVENTS OR INTERFERES SOME ACTIVE ACTIVITIES AND ADLS

## 2020-10-21 ASSESSMENT — PAIN SCALES - GENERAL: PAINLEVEL_OUTOF10: 10

## 2020-10-21 ASSESSMENT — PAIN DESCRIPTION - FREQUENCY: FREQUENCY: CONTINUOUS

## 2020-10-21 ASSESSMENT — PAIN DESCRIPTION - LOCATION: LOCATION: BACK

## 2020-10-21 ASSESSMENT — PAIN SCALES - WONG BAKER: WONGBAKER_NUMERICALRESPONSE: 0

## 2020-10-21 ASSESSMENT — PAIN DESCRIPTION - ORIENTATION: ORIENTATION: MID;LOWER

## 2020-10-21 NOTE — PROGRESS NOTES
Johann Jenkins  10/21/2020    CONSULT / Spine Mets    Vitals:    10/21/20 0952   BP: 110/66   Pulse: 103   Resp: 17   Temp: 97.3 °F (36.3 °C)   SpO2: 100%        Wt Readings from Last 3 Encounters:   10/21/20 167 lb 9.6 oz (76 kg)   10/20/20 163 lb (73.9 kg)   10/20/20 163 lb (73.9 kg)        Pain Assessment  Pain Assessment: 0-10  Pain Level: 10  Dutton-Baker Pain Rating: No hurt  Patient's Stated Pain Goal: No pain  Pain Type: Acute pain  Pain Location: Back  Pain Orientation: Mid, Lower  Pain Descriptors: Constant, Sharp, Aching(severe pain)  Pain Frequency: Continuous  Pain Onset: On-going  Clinical Progression: Gradually worsening  Functional Pain Assessment: Prevents or interferes some active activities and ADLs  Non-Pharmaceutical Pain Intervention(s): Repositioned, Rest  Response to Pain Intervention: None  Multiple Pain Sites: No  Prescribed Narcotics- Norco and Percocet prn    Fall Risk:    Fall risk  Impaired/Unsteady Gait, Shortness of Breath, Uses Cane, Assist with Transfer/Ambulation, Provide Wheelchair PRN, Educate on Assistive Devices and Re-Evaluate Weekly    Nutritional Alteration:  Loss of Appetite  No Difficulties Swallowing  Voices Sufficient Oral Intake    Mediport: no    Pacemaker/ICD: no    Implants: yes, Left Wrist hardware    Previous XRT: L Parietal Met: 24Gy (3 x 8Gy) STRS at [facility] and R[facility] Chest Lung [facility]    Assessment: Pt with increasing mid lower back pain, reports constanting severe pain. Pt taking Norco 2 tabs every 4 hours and was just given Percocet rx yesterday but has yet to find a med to help make pain tolerable. Pt uses cane to ambulate.      Past Medical History:   Diagnosis Date    Cerebral artery occlusion with cerebral infarction [facility])     'an MRI said I had a small stroke but have no idea when that was\"    [facility]ch[facility]     Dr. Peterson Grade Emphysema     GERD (gastroesophageal reflux disease)     No meds as of 2/26/20    History of external beam radiation therapy 2020    L Parietal Met: 24Gy (3 x 8Gy) STRS at [facility] History of external beam radiation therapy 2020    Rt Chest Lung Mass/LNs: 60Gy IMRT    HIV disease ([facility] 2005    Follows with Dr. 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 [facility])     Knees to feet bilat    Seizure ([facility]     \"had one seizure in June( 2020) that is when they found the metastasis to the brain- removed with surgery and had brain radiation\"    Small cell lung carcinoma [facility])     \"dx June 2020- tx with chemo and radiation and start immunotherapy next week- follow with Dr Marvin Melendez" per p on 10/7/2020    Tracheal mass     Nothing found on [facility]    Wears glasses        Past Surgical History:   Procedure Laterality Date    BRAIN SURGERY  06/2020    Tumor removed from back of brain at [address]  10 + yrs ago    COLONOSCOPY  5/21/15    polyp, int hem, biopsy - Dr. George Hendrickson  20+ yrs ago    Peridontal disease    ENDOSCOPY, COLON, DIAGNOSTIC  5/21/15    egd bx, mild esophagitis, esoph ring, dil, hiatal hernia - Dr. Bernardo Davis Right 07/2020    INGUINAL     TONSILLECTOMY  1960's    VASC UPPER EXTREMITY VENOUS  UNI  07/2020    MEDIPORT    WRIST FRACTURE SURGERY Left 2/28/2020    WRIST OPEN REDUCTION INTERNAL FIXATION LEFT performed by Pramod Buckley MD at [facility]       No Known Allergies       Current Outpatient Medications:     oxyCODONE-acetaminophen (PERCOCET) 7.5-325 MG per tablet, Take 1 tablet by mouth every 4 hours as needed for Pain for up to 7 days. Intended supply: 30 days, Disp: 48 tablet, Rfl: 0    HYDROcodone-acetaminophen (NORCO) 5-325 MG per tablet, Take 1 tablet by mouth every 6 hours as needed for Pain for up to 14 days. Intended supply: 5 days. Take lowest dose possible to manage pain (Patient taking differently: Take 2 tablets by mouth every 4-6 hours as needed for Pain. Intended supply: 5 days.  Take lowest dose possible to manage pain), Disp: 56 tablet, Rfl: 0    sulfamethoxazole-trimethoprim (BACTRIM DS;SEPTRA DS) 800-160 MG per tablet, Take 1 tablet by mouth every 24 hours, Disp: 90 tablet, Rfl: 0    albuterol-ipratropium (COMBIVENT RESPIMAT)  MCG/ACT AERS inhaler, Inhale 1 puff into the lungs every 6 hours, Disp: 4 g, Rfl: 5    Magic Mouthwash (MIRACLE MOUTHWASH), Swish and spit 5 mLs 4 times daily as needed for Irritation, Disp: 400 mL, Rfl: 2    TRIUMEQ 600- MG TABS, TAKE ONE TABLET BY MOUTH ONCE DAILY. STORE IN ORIGINAL BOTTLE AT ROOM TEMPERATURE., Disp: 30 tablet, Rfl: 4    budesonide (PULMICORT FLEXHALER) 180 MCG/ACT AEPB inhaler, Inhale 2 puffs into the lungs 2 times daily. , Disp: 1 each, Rfl: 3    guaiFENesin (MUCINEX) 600 MG extended release tablet, Take 1,200 mg by mouth 2 times daily, Disp: , Rfl:     levETIRAcetam (KEPPRA) 500 MG tablet, TAKE 1 TABLET BY MOUTH EVERY 12 HOURS, Disp: , Rfl:     theophylline (UNIPHYL) 400 MG extended release tablet, Take one half tablet by mouth twice daily, Disp: 90 tablet, Rfl: 3    Respiratory Therapy Supplies (NEBULIZER/TUBING/MOUTHPIECE) KIT, 1 kit by Does not apply route daily as needed. Dx copd [phone], Disp: 1 kit, Rfl: 0    zolpidem (AMBIEN) 10 MG tablet, Take 10 mg by mouth nightly as needed. , Disp: , Rfl:         Electronically signed by Lindsey Dubin, RN on 10/21/2020 at 10:30 AM

## 2020-10-21 NOTE — PLAN OF CARE
Radiation education and handouts given. Side effects and management reviewed with pt. All questions answered and pt voices understanding . Nursing Care Plan for Bone Radiation    Pain related to cancer diagnosis and treatment. Interventions   Pain assessment. Monitor pharmacological pain medication. Teach relaxation and repositioning techniques. Expected Outcome   Maintain patient's acceptable pain level or <5 on pain scale. Knowledge deficit related to diagnosis and treatment plan. Interventions   Assess patient's ability to comprehend diagnosis and treatment plan. Provide educational materials and teaching regarding plan of care. Provide emotional support and continued education. Refer to psychosocial coordinator if further assistance needed. Expected Outcome   Patient voices understanding of diagnosis and treatment plan. Altered skin integrity related to treatment. Interventions   Evaluation of skin integrity at therapy site. Advise patient on appropriate skin care. Instruct patient on recommended lotions/ointments/creams/dressing changes to use on therapy site. Expected Outcome   Minimize adverse skin reaction/infection at treatment site. Altered musculo/skeletal/functional status. Interventions   Assess range of motion and ability to perform ADL's. Provide assistance when needed. Referral for OT/PT evaluation and treat. Expected Outcome   Patient to obtain/maintain highest potential functional level. To re-evaluate one month post radiation treatments.

## 2020-10-21 NOTE — CONSULTS
Radiation Oncology Consultation  Encounter Date: 10/21/2020 10:14 AM    Mr. Clarissa Ferrera is a 59 y.o. male  : 1955  MRN: [id number]  Acct Number: [de-identified]  Requesting Provider: Kalpana Tee MD        CONSULTANT: Kalpana Tee    PHYSICIANS:   Primary Care: Samina Barger MD   Medical Oncologist: Nikki Mendoza M.D.   [address] [facility], [address] [place] [address],Third Floor      DIAGNOSIS: Metastatic non-small cell lung cancer with symptomatic osseous metastasis      Cancer Staging  Primary malignant neoplasm of bronchus of right middle lobe [facility])  Staging form: Lung, AJCC 8th Edition  - Clinical: Stage IV (pM1) - Signed by Kalpana Tee MD on 2020         TREATMENT COURSE:  Oncology History   Secondary malignant neoplasm of brain ([address]   2020 Initial Diagnosis    Secondary malignant neoplasm of brain ([address]     2020 Surgery    Resection left parietal metastasis     7/15/2020 - 2020 Radiation    L Parietal Met: 24Gy (3 x 8Gy) STRS at [facility]     Primary malignant neoplasm of bronchus of right middle lobe ([address]   2020 Initial Diagnosis    Primary malignant neoplasm of bronchus of right middle lobe ([address]     2020 - 2020 Radiation    Rt Chest Lung Mass/LNs: 60Gy IMRT         Secondary malignant neoplasm of bone and bone marrow ([address]   2020 Initial Diagnosis    Secondary malignant neoplasm of bone and bone marrow ([address]  Isolated left sacral met on PET           HPI:   Today I had the privilege of seeing Clarissa Ferrera in consultation. As you recall, Clarissa Ferrera is a 59 y.o. male who has a history of HIV positivity as well as metastatic non-small cell lung cancer. He presents today with the complaint of increasing back pain, pointing to the lower thoracic spine area. He reports the pain began several weeks ago and has been progressively worsening. He states it rated over 10 on the pain scale.   He underwent a thoracic spine MRI showing multiple areas of metastatic disease along the thoracic spine as well as some paraspinal masses-C imaging results below. He was initiated on Norco 2 every 4 hours without significant benefit, so was switched yesterday to Percocet 5/325 mg 1 every 4 hours, which has brought his pain level down to an 8. He has chronic paresthesias of his feet secondary to his HIV without acute changes. He denies any new paresthesias or focal weakness. He also complains of pain pointing to the right anterior axillary ribs and left posterior axillary ribs which she states gets up to a 7 on a scale of 1-10 with 10 being the worst with pressure. Reports they do not bother him substantially without pressure. He denies any fevers, chills, or drenching night sweats. He has not noticed any new lumps or bumps anywhere throughout his body. He presents today for consideration of his treatment options.     Past Medical History:   Diagnosis Date    Cerebral artery occlusion with cerebral infarction [facility])     'an MRI said I had a small stroke but have no idea when that was\"    COPD (chronic obstructive pulmonary disease) [facility]     Dr. Aragon Jose Juan Emphysema     GERD (gastroesophageal reflux disease)     No meds as of 2/26/20    History of external beam radiation therapy 2020    L Parietal Met: 24Gy (3 x 8Gy) STRS at [facility] History of external beam radiation therapy 2020    Rt Chest Lung Mass/LNs: 60Gy IMRT    HIV disease ([facility] 2005    Follows with Dr. 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)     Knees to feet bilat    Seizure ([facility]     \"had one seizure in June( 2020) that is when they found the metastasis to the brain- removed with surgery and had brain radiation\"    Small cell lung carcinoma [facility])     \"dx June 2020- tx with chemo and radiation and start immunotherapy next week- follow with Dr Elías Parekh" per p on 10/7/2020    Tracheal mass     Nothing found on [facility]    Wears glasses         Past Surgical History:   Procedure Laterality Date    BRAIN SURGERY 2020    Tumor removed from back of brain at [address]  10 + yrs ago    COLONOSCOPY  5/21/15    polyp, int hem, biopsy - Dr. Eduardo Pagan  20+ yrs ago    Peridontal disease    ENDOSCOPY, COLON, DIAGNOSTIC  5/21/15    egd bx, mild esophagitis, esoph ring, dil, hiatal hernia - Dr. Victorina De Paz Right 2020    INGUINAL     TONSILLECTOMY  1960's    VASC UPPER EXTREMITY VENOUS  UNI  2020    MEDIPORT    WRIST FRACTURE SURGERY Left 2020    WRIST OPEN REDUCTION INTERNAL FIXATION LEFT performed by Mateo Matthews MD at [address] [place] History   Problem Relation Age of Onset    Cancer Mother         breast ca    Heart Disease Father     High Cholesterol Sister     Cancer Maternal Uncle         Melanoma    Cancer Maternal Grandmother         Lung (Smoker)    Heart Disease Maternal Grandmother     Cancer Maternal Grandfather         Leukemia    Heart Disease Maternal Grandfather     Heart Disease Paternal Grandmother     Heart Disease Paternal Grandfather        Social History     Socioeconomic History    Marital status: Single     Spouse name: Not on file    Number of children: Not on file    Years of education: Not on file    Highest education level: Not on file   Occupational History    Occupation: retired/   Social Needs    Financial resource strain: Not on file    Food insecurity     Worry: Not on file     Inability: Not on file    Transportation needs     Medical: Not on file     Non-medical: Not on file   Tobacco Use    Smoking status: Former Smoker     Packs/day: 1.00     Years: 32.00     Pack years: 32.00     Types: Cigarettes     Last attempt to quit: 2004     Years since quittin.5    Smokeless tobacco: Never Used   Substance and Sexual Activity    Alcohol use: No    Drug use: No    Sexual activity: Not Currently     Partners: Female, Male   Lifestyle    Physical activity     Days per week: Not on file     Minutes per session: Not on file    Stress: Not on file   Relationships    Social connections     Talks on phone: Not on file     Gets together: Not on file     Attends Voodoo service: Not on file     Active member of club or organization: Not on file     Attends meetings of clubs or organizations: Not on file     Relationship status: Not on file    Intimate partner violence     Fear of current or ex partner: Not on file     Emotionally abused: Not on file     Physically abused: Not on file     Forced sexual activity: Not on file   Other Topics Concern    Not on file   Social History Narrative    Not on file           ALLERGIES: No Known Allergies     Current Outpatient Medications   Medication Sig Dispense Refill    oxyCODONE-acetaminophen (PERCOCET) 7.5-325 MG per tablet Take 1 tablet by mouth every 4 hours as needed for Pain for up to 7 days. Intended supply: 30 days 48 tablet 0    HYDROcodone-acetaminophen (NORCO) 5-325 MG per tablet Take 1 tablet by mouth every 6 hours as needed for Pain for up to 14 days. Intended supply: 5 days. Take lowest dose possible to manage pain (Patient taking differently: Take 2 tablets by mouth every 4-6 hours as needed for Pain. Intended supply: 5 days. Take lowest dose possible to manage pain) 56 tablet 0    sulfamethoxazole-trimethoprim (BACTRIM DS;SEPTRA DS) 800-160 MG per tablet Take 1 tablet by mouth every 24 hours 90 tablet 0    albuterol-ipratropium (COMBIVENT RESPIMAT)  MCG/ACT AERS inhaler Inhale 1 puff into the lungs every 6 hours 4 g 5    Magic Mouthwash (MIRACLE MOUTHWASH) Swish and spit 5 mLs 4 times daily as needed for Irritation 400 mL 2    TRIUMEQ 600- MG TABS TAKE ONE TABLET BY MOUTH ONCE DAILY. STORE IN ORIGINAL BOTTLE AT ROOM TEMPERATURE. 30 tablet 4    budesonide (PULMICORT FLEXHALER) 180 MCG/ACT AEPB inhaler Inhale 2 puffs into the lungs 2 times daily.  1 each 3    guaiFENesin (MUCINEX) 600 MG extended release tablet Take 1,200 mg by mouth 2 times daily      levETIRAcetam (KEPPRA) 500 MG tablet TAKE 1 TABLET BY MOUTH EVERY 12 HOURS      theophylline (UNIPHYL) 400 MG extended release tablet Take one half tablet by mouth twice daily 90 tablet 3    Respiratory Therapy Supplies (NEBULIZER/TUBING/MOUTHPIECE) KIT 1 kit by Does not apply route daily as needed. Dx copd 496 1 kit 0    zolpidem (AMBIEN) 10 MG tablet Take 10 mg by mouth nightly as needed. No current facility-administered medications for this encounter. Outpatient Medications Marked as Taking for the 10/21/20 encounter [facility] Encounter) with Shima Rajan MD   Medication Sig Dispense Refill    oxyCODONE-acetaminophen (PERCOCET) 7.5-325 MG per tablet Take 1 tablet by mouth every 4 hours as needed for Pain for up to 7 days. Intended supply: 30 days 48 tablet 0    HYDROcodone-acetaminophen (NORCO) 5-325 MG per tablet Take 1 tablet by mouth every 6 hours as needed for Pain for up to 14 days. Intended supply: 5 days. Take lowest dose possible to manage pain (Patient taking differently: Take 2 tablets by mouth every 4-6 hours as needed for Pain. Intended supply: 5 days. Take lowest dose possible to manage pain) 56 tablet 0    sulfamethoxazole-trimethoprim (BACTRIM DS;SEPTRA DS) 800-160 MG per tablet Take 1 tablet by mouth every 24 hours 90 tablet 0    albuterol-ipratropium (COMBIVENT RESPIMAT)  MCG/ACT AERS inhaler Inhale 1 puff into the lungs every 6 hours 4 g 5    Magic Mouthwash (MIRACLE MOUTHWASH) Swish and spit 5 mLs 4 times daily as needed for Irritation 400 mL 2    TRIUMEQ 600- MG TABS TAKE ONE TABLET BY MOUTH ONCE DAILY. STORE IN ORIGINAL BOTTLE AT ROOM TEMPERATURE. 30 tablet 4    budesonide (PULMICORT FLEXHALER) 180 MCG/ACT AEPB inhaler Inhale 2 puffs into the lungs 2 times daily.  1 each 3    guaiFENesin (MUCINEX) 600 MG extended release tablet Take 1,200 mg by mouth 2 times daily      levETIRAcetam (KEPPRA) 500 MG tablet TAKE 1 TABLET BY MOUTH EVERY 12 HOURS      theophylline (UNIPHYL) 400 MG extended release tablet Take one half tablet by mouth twice daily 90 tablet 3    Respiratory Therapy Supplies (NEBULIZER/TUBING/MOUTHPIECE) KIT 1 kit by Does not apply route daily as needed. Dx copd 496 1 kit 0    zolpidem (AMBIEN) 10 MG tablet Take 10 mg by mouth nightly as needed.             LABORATORY STUDIES:   CBC:   Lab Results   Component Value Date    WBC 5.9 10/13/2020    RBC 4.57 10/13/2020    HGB 13.2 10/13/2020    HCT 38.4 10/13/2020    MCV 84.0 10/13/2020    MCH 28.9 10/13/2020    MCHC 34.4 10/13/2020    RDW 18.7 10/13/2020     10/13/2020    MPV 8.3 10/13/2020     CMP:  Lab Results   Component Value Date     10/13/2020    K 2.7 10/13/2020    CL 92 10/13/2020    CO2 24 10/13/2020    BUN 10 10/13/2020    CREATININE 1.3 10/13/2020    CREATININE 1.3 10/13/2020    GFRAA >60 10/13/2020    AGRATIO 1.2 09/21/2020    LABGLOM 58 10/13/2020    GLUCOSE 110 10/13/2020    PROT 6.6 10/13/2020    PROT 6.3 09/21/2020    LABALBU 3.8 10/13/2020    CALCIUM 9.6 10/13/2020    BILITOT 0.5 10/13/2020    ALKPHOS 157 10/13/2020    AST 14 10/13/2020    ALT 6 10/13/2020     Onc labs:   Lab Results   Component Value Date    PSA 2.03 03/13/2013    CEA 3.4 08/30/2016           RADIOLOGIC STUDIES:     Mri Thoracic Spine W Wo Contrast    Result Date: 10/17/2020  EXAMINATION: MRI OF THE THORACIC SPINE WITHOUT AND WITH CONTRAST  10/16/2020 10:17 am TECHNIQUE: Multiplanar multisequence MRI of the thoracic spine was performed without and with the administration of intravenous contrast. COMPARISON: None HISTORY: ORDERING SYSTEM PROVIDED HISTORY: Acute midline thoracic back pain TECHNOLOGIST PROVIDED HISTORY: Reason for Exam: Acute midline thoracic back pain; pt states excruciating back pain x 2 days Acuity: Acute Type of Exam: Initial Relevant Medical/Surgical History: 7mL multihance; GFR 56 (10-) FINDINGS: BONES/ALIGNMENT: Thoracic spine alignment is normal.  Thoracic vertebral bodies are normal in height. No acute thoracic spine fracture. Foci of abnormal marrow replacement throughout the thoracic spine with associated bone marrow edema and enhancement consistent with osseous metastatic disease. Lesions are seen within T2, T3, T6, T7, T10, T11, and T12 vertebral bodies. Additional metastases within the right lamina and facet of T6, right transverse process of T7 and T10 spinous process. SPINAL CORD: No abnormal cord signal is seen. SOFT TISSUES:  Enhancing mass in the left paraspinal soft tissues at the T3 level measures 2.8 x 1.9 cm. Smaller enhancing left paraspinal mass more inferiorly measures 1.5 x 1.4 cm. DEGENERATIVE CHANGES: No significant spinal canal stenosis or neural foraminal narrowing of the thoracic spine. Right thyroid lobe T2 hyperintense nodule measures 1.7 x 2.3 cm.     1. Thoracic spine metastatic disease. No pathologic compression fracture. 2. Left paraspinal soft tissue enhancing mass is suspicious for soft tissue metastasis. 3. Right thyroid lobe nodule measures 2.3 cm. Nonemergent thyroid ultrasound suggested. The findings were sent to the Radiology Results [address] at 11:08 am on 10/16/2020to be communicated to a licensed caregiver. RECOMMENDATIONS: 2.3 cm incidental thyroid nodule. Recommend thyroid US. Reference: J Am Minh Radiol. 2015 Feb;12(2): 143-50     Ir Contrast Injection  Existing Cv Access Device Evaluation W Fluoro    Result Date: 10/9/2020  PROCEDURE: IR CONTRAST INJECTION  EXISTING CV ACCESS DEVICE EVALUATION W FLUOROSC 10/8/2020 HISTORY: ORDERING SYSTEM PROVIDED HISTORY: Complications due to heart valve prosthesis, unspecified complication, initial encounter TECHNOLOGIST PROVIDED HISTORY: Reason for exam:->Port Check TECHNIQUE: Fluoroscopy time 0.1 minutes with 2 fluoroscopic images/runs saved. AK 15 CONTRAST: Approximately 6 cc of Isovue 370.  SEDATION: None FLUOROSCOPY DOSE AND TYPE OR TIME AND EXPOSURES: Fluoroscopy time 0.1 minutes with 2 fluoroscopic images/runs saved. AK 15 DESCRIPTION OF PROCEDURE: Informed consent was obtained after a detailed explanation of the procedure including risks, benefits, and alternatives. Universal protocol was observed. The previously placed left-sided chemo port was accessed by the radiology nursing staff RN [facility]. Dilute contrast injected and fluoroscopic images obtained. There was good flow of contrast noted within the chemo port and into the SVC and right sided cardiac chambers. There was difficulty with aspiration noted likely secondary to the catheter tip against the venous wall. Findings were discussed with the patient. The needle was withdrawn and sterile dressing applied. There was no bleeding or hematoma noted. Patient tolerated the procedure well. FINDINGS: No signs for catheter breakage or extravasation. Difficulty with aspiration likely secondary to the catheter tip against the venous wall. No signs for catheter breakage or extravasation. Difficulty with aspiration likely secondary to the catheter tip against the venous wall. PET/CT:   Results for orders placed during the hospital encounter of 07/09/20   PET CT Skull Base To Mid Thigh    Narrative EXAMINATION:  WHOLE BODY PET/CT    7/9/2020    TECHNIQUE:  Following IV injection of 13.5 mCi of F 18 -FDG, PET  tumor imaging was  acquired from the skull vertex to the mid thighs. Computed tomography was  used for purposes of attenuation correction and anatomic localization. Fusion imaging was utilized for interpretation. Uptake time 90 mins. Glucose level 94 mg/dl. COMPARISON:  CT scan of the chest 06/13/2020    HISTORY:  ORDERING SYSTEM PROVIDED HISTORY: Primary malignant neoplasm of bronchus of  right middle lobe [facility])  TECHNOLOGIST PROVIDED HISTORY:  initial staging lung ca. FINDINGS:  HEAD/NECK: No metabolically active cervical lymphadenopathy.     CHEST: Heterogeneous activity associated with the of the  right lung apex. There is minimal linear scarring in the lingula  and middle lobe. The lungs are otherwise clear. There is mild  smooth bronchial wall thickening bilaterally. There are calcified  right hilar and right paratracheal lymph nodes from old  granulomatous disease. There are no enlarged thoracic lymph  nodes. There is very minimal symmetric bilateral gynecomastia. There is a 5 mm low-attenuation nodule within the right thyroid  lobe, unchanged. There are scattered tiny calcified granulomata  throughout the imaged spleen and there a few scattered within the  imaged liver. There is a low-attenuation lesion in the  anteromedial aspect of the upper-midportion of the right kidney,  incompletely included in the field of coverage on this study,  compatible with a cyst and unchanged from the abdominal CT from  7/12/2008. It measures 12 mm. The visualized portion of the upper  abdomen is otherwise essentially normal.    IMPRESSION:  1. No evidence of malignancy in the chest.  2. No acute cardiopulmonary disease. 3. Moderate-severe pulmonary emphysema. 4. Calcified old granulomatous disease in the chest and  abdomen. Thank you for allowing us to participate in the care of your  patient. DICTATED BY:   Mavis Malone       CT Chest w/o contrast:   Results for orders placed during the hospital encounter of 06/13/20   CT CHEST WO CONTRAST    Narrative EXAMINATION:  CT OF THE CHEST WITHOUT CONTRAST 6/13/2020 2:58 am    TECHNIQUE:  CT of the chest was performed without the administration of intravenous  contrast. Multiplanar reformatted images are provided for review. Dose  modulation, iterative reconstruction, and/or weight based adjustment of the  mA/kV was utilized to reduce the radiation dose to as low as reasonably  achievable. COMPARISON:  08/29/2016    HISTORY:  ORDERING SYSTEM PROVIDED HISTORY: PNA? TECHNOLOGIST PROVIDED HISTORY:  Reason for exam:->PNA?     Initial examination. FINDINGS:  Mediastinum: The heart is normal in size. There is no pericardial effusion. The thoracic aorta is normal in course and caliber. A 1.8 cm subcarinal  lymph node is demonstrated. A 1.4 cm superior mediastinal lymph node is  decreased in size from 2016. Lungs/pleura: There is severe emphysema. There is masslike consolidation of  the right middle lobe, which is collapsed. The airways to the right middle  lobe are occluded. There is a 1.0 x 1.6 cm partially cystic left upper lobe  nodule. No pleural effusion or pneumothorax. Upper Abdomen: There is an indeterminate 1.3 cm cyst in the upper pole of the  left kidney, which appears increased in size from 2015. Soft Tissues/Bones: No suspicious osteolytic or osteoblastic lesions are  demonstrated. Impression 1. Postobstructive collapse/consolidation of the right middle lobe. Further  evaluation with bronchoscopy should be considered as an endobronchial lesion  cannot be excluded. 2. Part solid 10 x 16 mm left upper lobe nodule, which could be infectious,  inflammatory, or neoplastic. Management guidelines of pulmonary nodules is  provided below. 3. Enlarged subcarinal lymph node, which is either reactive or metastatic. 4. Indeterminate 1.3 cm cystic nodule in the upper pole of the left kidney. Further evaluation with renal protocol MRI is recommended. RECOMMENDATIONS:  Fleischner Society guidelines for follow-up and management of incidentally  detected pulmonary nodules:    Single Solid Nodule:    Nodule size less than 6 mm  In a low-risk patient, no routine follow-up. In a high-risk patient, optional CT at 12 months. Nodule size equals 6-8 mm  In a low-risk patient, CT at 6-12 months, then consider CT at 18-24 months. In a high-risk patient, CT at 6-12 months, then CT at 18-24 months. Nodule size greater than 8 mm      In a low-risk patient, consider CT at 3 months, PET/CT, or tissue sampling.     In a high-risk patient, consider CT at 3 months, PET/CT, or tissue sampling.    - Low risk patients include individuals with minimal or absent history of  smoking and other known risk factors. - High risk patients include individuals with a history or smoking or known  risk factors. Radiology 2017 http://pubs. rsna.org/doi/full/10.1148/radiol. 7087381543         REVIEW OF SYSTEMS:   Constitutional:  Patient denies fevers, rigors, or drenching night sweats. Eyes:  The patient denies any recent visual changes, diplopia, or cataracts  ENMT:  The patient denies any ear pain, hearing changes, or nosebleeds  Respiratory:  The patient denies any SOB, hemoptysis, or green sputum production. He reports some chronic wheezing and dyspnea on exertion which he attributes to his COPD. Cardiovascular: The patient denies any chest pain, leg edema, or fainting spells  GI:  Patient denies nausea, vomiting, diarrhea, melena, or hematochezia  : Patient denies dysuria, hematuria, or urinary incontinence. Integumentary: patient denies skin rashes, pruritis, or arm edema  Endocrine:  Patient denies any diabetic or thyroid problems  Neurologic: Patient denies headaches, focal weakness, or disorientation. Chronic paresthesias of his feet are unchanged. He reports he has trouble with left arm abduction due to the rib pain    PHYSICAL EXAMINATION:   VITAL SIGNS: /66   Pulse 103   Temp 97.3 °F (36.3 °C) (Infrared)   Resp 17   Ht 5' 9\" (1.753 m)   Wt 167 lb 9.6 oz (76 kg)   SpO2 100%   BMI 24.75 kg/m²   ECOG PERFORMANCE STATUS: 1  PAIN RATIN    GENERAL: Pleasant well-developed adult in no acute distress. Alert and oriented ×3  SKIN: Warm and dry, without jaundice, ecchymoses, or petechiae. HEENT: Normocephalic, atraumatic. PERRLA, Sclerae anicteric, oral mucosa moist without lesion or exudate in the visible oral cavity or oropharynx,  NECK:  No JVD; Supple.  No cervical, supraclavicular, or infraclavicular lymphadenopathy is palpable. THORAX: No palpable axillary adenopathy;  LUNGS: Bilaterally clear to auscultation. No rales, rhonci, or wheezing are present. Good inspiratory effort, no accessory muscle use. CARDIAC: Regular rate and rhythm, without murmurs, clicks, or gallops   ABDOMEN: Normoactive bowels sounds are present. Soft. Non-tender and non-distended. No hepatosplenomegaly or masses are present. EXTREMITIES: No cyanosis, clubbing or edema is present. FROM  NEUROLOGIC: Gait unremarkable. The patient is alert and oriented. CN II-XII are grossly intact. Sensation to light touch is intact and symmetric in the fingers and feet. Muscle strength is 5/5 in both the upper and lower extremities. IMPRESSION/PLAN:  Ketan Quinn is a 59 y.o. male who has a history of being HIV positive and metastatic non-small cell lung cancer who was recently found to have disease progression with the development of metastatic disease throughout the thoracic spine with symptomatic involvement at the lower levels. I have reviewed the patient's radiographic MRI images, with the patient in the room. The treatment options were discussed in detail with the patient. I do recommend palliative radiation therapy to the lower thoracic spine region with a margin. The potential acute side effects and chronic complications of radiation therapy to the lower thoracic spine region were discussed in detail with the patient. The patient voiced understanding, and the patient's questions were answered. The patient has decided to proceed with radiation therapy. I will schedule a simulation to occur at the next available time and will plan on initiating radiation therapy shortly thereafter. Thank-you for allowing me to participate in the care of this very pleasant patient.         Electronically signed by Derek Alexander MD on 10/21/2020 at 10:14 AM

## 2020-10-21 NOTE — TELEPHONE ENCOUNTER
Notified pt of Bone Scan appointment for Monday, October 26, 2020 at [address] [place] [address]  [id number]. Pt to arrive at 9:30 for 10:00 injection and 1:30 for 2:00 scan. Pt voices understanding. Contact number given, pt to call with questions or concerns.

## 2020-10-22 LAB
A/G RATIO: 1.8
ALBUMIN SERPL-MCNC: 3.5 G/DL
ALP BLD-CCNC: 161 U/L
ALT SERPL-CCNC: 10 U/L
AST SERPL-CCNC: 19 U/L
BASOPHILS ABSOLUTE: 0 /ΜL
BASOPHILS RELATIVE PERCENT: 0.6 %
BILIRUB SERPL-MCNC: 0.2 MG/DL (ref 0.1–1.4)
BILIRUBIN DIRECT: 0.1 MG/DL
BILIRUBIN, INDIRECT: 0
BUN BLDV-MCNC: 9 MG/DL
CALCIUM SERPL-MCNC: 8.6 MG/DL
CHLORIDE BLD-SCNC: 102 MMOL/L
CO2: 23 MMOL/L
CREAT SERPL-MCNC: 0.9 MG/DL
EOSINOPHILS ABSOLUTE: 0.1 /ΜL
EOSINOPHILS RELATIVE PERCENT: 1.1 %
GFR CALCULATED: 90
GLOBULIN: 2
GLUCOSE BLD-MCNC: 93 MG/DL
HCT VFR BLD CALC: 33.4 % (ref 41–53)
HEMOGLOBIN: 11.2 G/DL (ref 13.5–17.5)
LYMPHOCYTES ABSOLUTE: 0.6 /ΜL
LYMPHOCYTES RELATIVE PERCENT: 10.6 %
MCH RBC QN AUTO: 29.5 PG
MCHC RBC AUTO-ENTMCNC: 33.6 G/DL
MCV RBC AUTO: 87.8 FL
MONOCYTES ABSOLUTE: 0.9 /ΜL
MONOCYTES RELATIVE PERCENT: 15.4 %
NEUTROPHILS ABSOLUTE: 4.2 /ΜL
NEUTROPHILS RELATIVE PERCENT: 72.3 %
PDW BLD-RTO: 22.6 %
PLATELET # BLD: 259 K/ΜL
PMV BLD AUTO: 0 FL
POTASSIUM SERPL-SCNC: 4.1 MMOL/L
PROTEIN TOTAL: 5.5 G/DL
RBC # BLD: 3.81 10^6/ΜL
SODIUM BLD-SCNC: 140 MMOL/L
WBC # BLD: 5.9 10^3/ML

## 2020-10-23 ENCOUNTER — HOSPITAL ENCOUNTER (OUTPATIENT)
Dept: RADIATION ONCOLOGY | Age: 65
Discharge: HOME | End: 2020-10-23
Attending: RADIOLOGY
Payer: COMMERCIAL

## 2020-10-23 ENCOUNTER — TELEPHONE (OUTPATIENT)
Dept: ONCOLOGY | Age: 65
End: 2020-10-23

## 2020-10-23 PROCEDURE — 77332 RADIATION TREATMENT AID(S): CPT | Performed by: RADIOLOGY

## 2020-10-23 PROCEDURE — 77290 THER RAD SIMULAJ FIELD CPLX: CPT | Performed by: RADIOLOGY

## 2020-10-23 PROCEDURE — 77263 THER RADIOLOGY TX PLNG CPLX: CPT | Performed by: RADIOLOGY

## 2020-10-23 RX ORDER — FENTANYL 25 UG/H
1 PATCH TRANSDERMAL
Qty: 5 PATCH | Refills: 0 | Status: SHIPPED | OUTPATIENT
Start: 2020-10-23 | End: 2020-11-07

## 2020-10-26 ENCOUNTER — HOSPITAL ENCOUNTER (OUTPATIENT)
Dept: NUCLEAR MEDICINE | Age: 65
Discharge: HOME | End: 2020-10-26
Payer: COMMERCIAL

## 2020-10-26 ENCOUNTER — HOSPITAL ENCOUNTER (OUTPATIENT)
Dept: CT IMAGING | Age: 65
Discharge: HOME | End: 2020-10-26
Payer: COMMERCIAL

## 2020-10-26 ENCOUNTER — HOSPITAL ENCOUNTER (OUTPATIENT)
Dept: RADIATION ONCOLOGY | Age: 65
Discharge: HOME | End: 2020-10-26
Attending: RADIOLOGY
Payer: COMMERCIAL

## 2020-10-26 LAB
GFR AFRICAN AMERICAN: >60 ML/MIN/1.73M2
GFR NON-AFRICAN AMERICAN: >60 ML/MIN/1.73M2
POC CREATININE: 0.9 MG/DL (ref 0.9–1.3)

## 2020-10-26 PROCEDURE — 71260 CT THORAX DX C+: CPT

## 2020-10-26 PROCEDURE — 77280 THER RAD SIMULAJ FIELD SMPL: CPT | Performed by: RADIOLOGY

## 2020-10-26 PROCEDURE — 77412 RADIATION TX DELIVERY LVL 3: CPT | Performed by: RADIOLOGY

## 2020-10-26 PROCEDURE — 72193 CT PELVIS W/DYE: CPT

## 2020-10-26 PROCEDURE — 78306 BONE IMAGING WHOLE BODY: CPT

## 2020-10-26 PROCEDURE — 6360000004 HC RX CONTRAST MEDICATION: Performed by: RADIOLOGY

## 2020-10-26 PROCEDURE — 77306 TELETHX ISODOSE PLAN SIMPLE: CPT | Performed by: RADIOLOGY

## 2020-10-26 PROCEDURE — 77301 RADIOTHERAPY DOSE PLAN IMRT: CPT | Performed by: RADIOLOGY

## 2020-10-26 PROCEDURE — A9503 TC99M MEDRONATE: HCPCS | Performed by: RADIOLOGY

## 2020-10-26 PROCEDURE — 3430000000 HC RX DIAGNOSTIC RADIOPHARMACEUTICAL: Performed by: RADIOLOGY

## 2020-10-26 RX ORDER — TC 99M MEDRONATE 20 MG/10ML
29.3 INJECTION, POWDER, LYOPHILIZED, FOR SOLUTION INTRAVENOUS
Status: COMPLETED | OUTPATIENT
Start: 2020-10-26 | End: 2020-10-26

## 2020-10-26 RX ADMIN — IOHEXOL 50 ML: 240 INJECTION, SOLUTION INTRATHECAL; INTRAVASCULAR; INTRAVENOUS; ORAL at 10:44

## 2020-10-26 RX ADMIN — IOPAMIDOL 80 ML: 755 INJECTION, SOLUTION INTRAVENOUS at 10:44

## 2020-10-26 RX ADMIN — TC 99M MEDRONATE 29.3 MILLICURIE: 20 INJECTION, POWDER, LYOPHILIZED, FOR SOLUTION INTRAVENOUS at 09:25

## 2020-10-26 NOTE — PROGRESS NOTES
Weekly Radiation Treatment Progress Note    DATE OF SERVICE: 10/26/2020     DIAGNOSIS:  Cancer Staging  Primary malignant neoplasm of bronchus of right middle lobe [facility])  Staging form: Lung, AJCC 8th Edition  - Clinical: Stage IV (pM1) - Signed by Hannah Marques MD on 7/6/2020       TREATMENT COURSE:   Oncology History   Secondary malignant neoplasm of brain ([address]   6/13/2020 Initial Diagnosis    Secondary malignant neoplasm of brain ([address]     6/13/2020 Surgery    Resection left parietal metastasis     7/15/2020 - 7/17/2020 Radiation    L Parietal Met: 24Gy (3 x 8Gy) STRS at [facility]     Primary malignant neoplasm of bronchus of right middle lobe ([facility]   7/6/2020 Initial Diagnosis    Primary malignant neoplasm of bronchus of right middle lobe ([facility]     8/18/2020 - 9/29/2020 Radiation    Rt Chest Lung Mass/LNs: 60Gy IMRT         Secondary malignant neoplasm of bone and bone marrow ([address]   9/21/2020 Initial Diagnosis    Secondary malignant neoplasm of bone and bone marrow (HCC)  Isolated left sacral met on PET           Site: T9 - L1  Current Radiation Dose: 300 cGy    Subjective:    1st palliative tx today  No RT complaints      EXAM  There were no vitals filed for this visit.   NAD    Setup images, chart, plan reviewed      A/P:   Tolerating tx well  Continue RT as planned      Electronically signed by Silvio Guevara MD on 10/26/2020 at 3:37 PM

## 2020-10-27 ENCOUNTER — HOSPITAL ENCOUNTER (OUTPATIENT)
Dept: RADIATION ONCOLOGY | Age: 65
Discharge: HOME | End: 2020-10-27
Attending: RADIOLOGY
Payer: COMMERCIAL

## 2020-10-27 ENCOUNTER — CLINICAL DOCUMENTATION (OUTPATIENT)
Dept: ONCOLOGY | Age: 65
End: 2020-10-27

## 2020-10-27 PROCEDURE — 77412 RADIATION TX DELIVERY LVL 3: CPT | Performed by: RADIOLOGY

## 2020-10-27 NOTE — PROGRESS NOTES
Email received from [employer] 10/27/20 for PA on Fentanyl Patch. I called [phone],  pharmacist indicated patient  RX 10/23/20 and paid cash. I called [employer] and filed appeal with Lillian Moreno appeal # [id number] requested appeal to be back dated to 10/23/20, appeal went to review.

## 2020-10-28 ENCOUNTER — HOSPITAL ENCOUNTER (OUTPATIENT)
Dept: RADIATION ONCOLOGY | Age: 65
Discharge: HOME | End: 2020-10-28
Attending: RADIOLOGY
Payer: COMMERCIAL

## 2020-10-28 PROCEDURE — 77412 RADIATION TX DELIVERY LVL 3: CPT | Performed by: RADIOLOGY

## 2020-10-29 ENCOUNTER — HOSPITAL ENCOUNTER (OUTPATIENT)
Dept: RADIATION ONCOLOGY | Age: 65
Discharge: HOME | End: 2020-10-29
Attending: RADIOLOGY
Payer: COMMERCIAL

## 2020-10-29 PROCEDURE — 77412 RADIATION TX DELIVERY LVL 3: CPT | Performed by: RADIOLOGY

## 2020-10-30 ENCOUNTER — HOSPITAL ENCOUNTER (OUTPATIENT)
Dept: RADIATION ONCOLOGY | Age: 65
Discharge: HOME | End: 2020-10-30
Attending: RADIOLOGY
Payer: COMMERCIAL

## 2020-10-30 ENCOUNTER — TELEPHONE (OUTPATIENT)
Dept: ONCOLOGY | Age: 65
End: 2020-10-30

## 2020-10-30 ENCOUNTER — HOSPITAL ENCOUNTER (OUTPATIENT)
Dept: INFUSION THERAPY | Age: 65
Setting detail: INFUSION SERIES
Discharge: HOME | End: 2020-10-30
Payer: COMMERCIAL

## 2020-10-30 DIAGNOSIS — C34.2 PRIMARY MALIGNANT NEOPLASM OF BRONCHUS OF RIGHT MIDDLE LOBE (HCC): Primary | ICD-10-CM

## 2020-10-30 PROCEDURE — 77412 RADIATION TX DELIVERY LVL 3: CPT | Performed by: RADIOLOGY

## 2020-10-30 PROCEDURE — 77427 RADIATION TX MANAGEMENT X5: CPT | Performed by: RADIOLOGY

## 2020-10-30 PROCEDURE — 6360000002 HC RX W HCPCS: Performed by: INTERNAL MEDICINE

## 2020-10-30 PROCEDURE — 96374 THER/PROPH/DIAG INJ IV PUSH: CPT

## 2020-10-30 PROCEDURE — 2580000003 HC RX 258: Performed by: INTERNAL MEDICINE

## 2020-10-30 PROCEDURE — 96360 HYDRATION IV INFUSION INIT: CPT

## 2020-10-30 PROCEDURE — 96361 HYDRATE IV INFUSION ADD-ON: CPT

## 2020-10-30 PROCEDURE — 99211 OFF/OP EST MAY X REQ PHY/QHP: CPT

## 2020-10-30 PROCEDURE — 77336 RADIATION PHYSICS CONSULT: CPT | Performed by: RADIOLOGY

## 2020-10-30 PROCEDURE — 96375 TX/PRO/DX INJ NEW DRUG ADDON: CPT

## 2020-10-30 RX ORDER — SODIUM CHLORIDE 0.9 % (FLUSH) 0.9 %
10 SYRINGE (ML) INJECTION PRN
Status: CANCELLED | OUTPATIENT
Start: 2020-10-30

## 2020-10-30 RX ORDER — ONDANSETRON 2 MG/ML
8 INJECTION INTRAMUSCULAR; INTRAVENOUS ONCE
Status: CANCELLED
Start: 2020-10-30

## 2020-10-30 RX ORDER — HEPARIN SODIUM (PORCINE) LOCK FLUSH IV SOLN 100 UNIT/ML 100 UNIT/ML
500 SOLUTION INTRAVENOUS PRN
Status: DISCONTINUED | OUTPATIENT
Start: 2020-10-30 | End: 2020-10-31 | Stop reason: HOSPADM

## 2020-10-30 RX ORDER — HEPARIN SODIUM (PORCINE) LOCK FLUSH IV SOLN 100 UNIT/ML 100 UNIT/ML
500 SOLUTION INTRAVENOUS PRN
Status: CANCELLED | OUTPATIENT
Start: 2020-10-30

## 2020-10-30 RX ORDER — DEXAMETHASONE SODIUM PHOSPHATE 10 MG/ML
8 INJECTION, SOLUTION INTRAMUSCULAR; INTRAVENOUS ONCE
Status: COMPLETED | OUTPATIENT
Start: 2020-10-30 | End: 2020-10-30

## 2020-10-30 RX ORDER — ONDANSETRON 2 MG/ML
8 INJECTION INTRAMUSCULAR; INTRAVENOUS ONCE
Status: COMPLETED | OUTPATIENT
Start: 2020-10-30 | End: 2020-10-30

## 2020-10-30 RX ORDER — 0.9 % SODIUM CHLORIDE 0.9 %
1000 INTRAVENOUS SOLUTION INTRAVENOUS ONCE
Status: CANCELLED | OUTPATIENT
Start: 2020-10-30

## 2020-10-30 RX ORDER — 0.9 % SODIUM CHLORIDE 0.9 %
1000 INTRAVENOUS SOLUTION INTRAVENOUS ONCE
Status: COMPLETED | OUTPATIENT
Start: 2020-10-30 | End: 2020-10-30

## 2020-10-30 RX ORDER — DEXAMETHASONE SODIUM PHOSPHATE 10 MG/ML
8 INJECTION, SOLUTION INTRAMUSCULAR; INTRAVENOUS ONCE
Status: CANCELLED
Start: 2020-10-30

## 2020-10-30 RX ORDER — SODIUM CHLORIDE 0.9 % (FLUSH) 0.9 %
10 SYRINGE (ML) INJECTION PRN
Status: DISCONTINUED | OUTPATIENT
Start: 2020-10-30 | End: 2020-10-31 | Stop reason: HOSPADM

## 2020-10-30 RX ORDER — SODIUM CHLORIDE 0.9 % (FLUSH) 0.9 %
20 SYRINGE (ML) INJECTION PRN
Status: CANCELLED | OUTPATIENT
Start: 2020-10-30

## 2020-10-30 RX ADMIN — DEXAMETHASONE SODIUM PHOSPHATE 8 MG: 10 INJECTION INTRAMUSCULAR; INTRAVENOUS at 11:33

## 2020-10-30 RX ADMIN — ONDANSETRON 8 MG: 2 INJECTION INTRAMUSCULAR; INTRAVENOUS at 11:33

## 2020-10-30 RX ADMIN — Medication 500 UNITS: at 13:01

## 2020-10-30 RX ADMIN — SODIUM CHLORIDE 1000 ML: 9 INJECTION, SOLUTION INTRAVENOUS at 11:33

## 2020-10-30 RX ADMIN — SODIUM CHLORIDE, PRESERVATIVE FREE 10 ML: 5 INJECTION INTRAVENOUS at 13:01

## 2020-10-30 NOTE — PROGRESS NOTES
Tolerated hydration well. Reviewed discharge instruction, voiced understanding. Copies of AVS given. Pt discharged home. Pt to exit via wheelchair.     Orders Placed This Encounter   Medications    0.9 % sodium chloride bolus    dexamethasone (PF) (DECADRON) injection 8 mg    ondansetron (ZOFRAN) injection 8 mg    sodium chloride flush 0.9 % injection 10 mL    heparin flush 100 UNIT/ML injection 500 Units

## 2020-10-30 NOTE — TELEPHONE ENCOUNTER
Returned call to [facility]. Hasn't been able to eat for three days and feels very weak. He stated that the last time he let it go too long and the NP told him to call sooner. IV hydration arranged at the outpt infusion center today prior to the pt having RT.

## 2020-10-30 NOTE — PROGRESS NOTES
treatment related, and attention on follow-up imaging is recommended. Surrounding vasogenic edema and mass effect have improved. New tiny focus of restricted diffusion in the left occipital lobe which could be a recent infarct (acute to subacute). No hemorrhage or mass effect associated with this. He was subsequently referred to me for further evaluation. He was seen by radiation oncologist at [facility] and he received SBRT to his brain surgical site between 7/15 and 7/17/2020. PET/CT scan done on July 9, 2020 showed Intense metabolic activity associated with the masslike opacification of the right middle lobe which may represent primary lung cancer and/or postobstructive pneumonia. Metabolically active right hilar and subcarinal lymph node metastasis. Solitary metabolically active skeletal metastasis in the left aspect of the sacrum. Definitive concurrent chemoradiation therapy was started since 8/18/2020 (started RT on 8/18/20 and chemo on 8/19/20) and he completed his last RT on 9/29/20. First line chemotherapy with Ronita Dale was started on 10/15/2020. On November 5, 2020, he presented to me for follow-up. I have been following him for metastatic non small cell lung cancer. He is s/p surgery followed by SBRT to his solitary brain metastasis. He is also status post definitive concurrent chemoradiation therapy to his lung lesions. He has been on first line chemotherapy with pembrolizumab since 10/15/20. Since his pain has been controlled very well with current pain medication, I will increase fentanyl patch dose to 50 mcg starting from today. I recommend him to continue with Norco every 4 hourly as needed basis for his breakthrough pain. I recommended to follow-up with Dr. Danya Head for possible palliative radiation therapy to his spine metastatic lesion. Otherwise, I recommended to continue with Ronita Dale and I will continue to follow him closely during immunotherapy.      Besides the pain, lymphadenopathy   LUNGS: no rhonchi, no crackles, no increased work of breathing and clear to auscultation, no wheezes, VBS,    CARDIOVASCULAR: regular rate and rhythm, normal S1 and S2, no murmur noted  ABDOMEN: soft, non-distended, normal bowel sounds x 4, non-tender, no masses palpated, no hepatosplenomegaly   MUSCULOSKELETAL: full range of motion noted, tone is normal  NEUROLOGIC: awake, alert, oriented to name, place and time. Motor skills grossly intact. SKIN: Normal skin color, texture, turgor and no jaundice.  appears intact   EXTREMITIES: no clubbing, no cyanosis, no leg swelling, no LE edema,      Labs:  Hematology:  Lab Results   Component Value Date    WBC 5.6 11/05/2020    RBC 3.98 (L) 11/05/2020    HGB 11.8 (L) 11/05/2020    HCT 36.0 (L) 11/05/2020    MCV 90.5 11/05/2020    MCH 29.6 11/05/2020    MCHC 32.8 11/05/2020    RDW 19.1 (H) 11/05/2020     11/05/2020    MPV 8.1 11/05/2020    SEGSPCT 71.8 (H) 11/05/2020    EOSRELPCT 1.2 11/05/2020    BASOPCT 0.2 11/05/2020    LYMPHOPCT 6.4 (L) 11/05/2020    MONOPCT 20.4 (H) 11/05/2020    SEGSABS 4.0 11/05/2020    EOSABS 0.1 11/05/2020    BASOSABS 0.0 11/05/2020    LYMPHSABS 0.4 11/05/2020    MONOSABS 1.2 11/05/2020    DIFFTYPE AUTOMATED DIFFERENTIAL 11/05/2020     No results found for: ESR  Chemistry:  Lab Results   Component Value Date     10/22/2020    K 4.1 10/22/2020     10/22/2020    CO2 23 10/22/2020    BUN 9 10/22/2020    CREATININE 0.9 10/26/2020    GLUCOSE 93 10/22/2020    CALCIUM 8.6 10/22/2020    PROT 5.5 10/22/2020    LABALBU 3.5 10/22/2020    BILITOT 0.2 10/22/2020    ALKPHOS 161 10/22/2020    AST 19 10/22/2020    ALT 10 10/22/2020    LABGLOM >60 10/26/2020    GFRAA >60 10/26/2020    AGRATIO 1.8 10/22/2020    GLOB 2.0 10/22/2020    PHOS 3.7 08/31/2016    MG 2.5 (H) 06/13/2020    POCCA 1.10 (L) 10/13/2020    POCGLU 115 (H) 10/13/2020     No results found for: MMA, LDH, HOMOCYSTEINE  No components found for: LD  Lab Results Component Value Date    TSHHS 1.720 05/09/2017    T4FREE 1.47 08/29/2016     Immunology:  Lab Results   Component Value Date    PROT 5.5 10/22/2020     No results found for: Isael Tomlin, KLFLCR  No results found for: B2M  Coagulation Panel:  Lab Results   Component Value Date    PROTIME 11.0 04/23/2013    INR 1.01 04/23/2013    APTT 29.1 06/05/2012    DDIMER 297 (H) 09/20/2015     Anemia Panel:  Lab Results   Component Value Date    JGFQGWBX50 388.0 08/29/2016    FOLATE 8.8 08/29/2016     Tumor Markers:  Lab Results   Component Value Date    CEA 3.4 08/30/2016     17.7 08/30/2016    PSA 2.03 03/13/2013     Observations:  No data recorded        Assessment & Plan:   Metastatic non-small cell lung cancer - adenocarcinoma  Brain metastasis - s/p excision    PLAN  Skip Nolasco is a 60-year-old very pleasant gentleman who was found to have right middle lobe lung mass with left parietal lobe brain metastasis when he presented with seizure on June 13, 2020. Further work ups with bronchoscopy, EBUS on 6/15/20 showed collapse of RML lateral segment bronchus. Final pathology from EBUS guided biopsy of subcarinal lymph node was consistent with  lung adenocarcinoma. PD-L1 was positive (95%). He was subsequently referred to me for further evaluation. He was seen by radiation oncologist at [facility] and he received SBRT to his brain surgical site between 7/15 and 7/17/2020. PET/CT scan done on July 9, 2020 showed Intense metabolic activity associated with the masslike opacification of the right middle lobe which may represent primary lung cancer and/or postobstructive pneumonia. Metabolically active right hilar and subcarinal lymph node metastasis. Solitary metabolically active skeletal metastasis in the left aspect of the sacrum. Definitive concurrent chemoradiation therapy was started since 8/18/2020 (started RT on 8/18/20 and chemo on 8/19/20) and he completed his last RT on 9/29/20.     First line chemotherapy with Masha Dee was started on 10/15/2020. On November 5, 2020, he presented to me for follow-up. I have been following him for metastatic non small cell lung cancer. He is s/p surgery followed by SBRT to his solitary brain metastasis. He is also status post definitive concurrent chemoradiation therapy to his lung lesions. He has been on first line chemotherapy with pembrolizumab since 10/15/20. Since his pain has been controlled very well with current pain medication, I will increase fentanyl patch dose to 50 mcg starting from today. I recommend him to continue with Norco every 4 hourly as needed basis for his breakthrough pain. I recommended to follow-up with Dr. Harvinder Waggoner for possible palliative radiation therapy to his spine metastatic lesion. Otherwise, I recommended to continue with Masha Dee and I will continue to follow him closely during immunotherapy. I answered all his questions and concerns for today. I asked him to follow up with her primary care physician on regular basis. Recent imaging and labs were reviewed and discussed with the patient.

## 2020-11-02 ENCOUNTER — CLINICAL DOCUMENTATION (OUTPATIENT)
Dept: ONCOLOGY | Age: 65
End: 2020-11-02

## 2020-11-02 ENCOUNTER — TELEPHONE (OUTPATIENT)
Dept: ONCOLOGY | Age: 65
End: 2020-11-02

## 2020-11-02 ENCOUNTER — HOSPITAL ENCOUNTER (OUTPATIENT)
Dept: RADIATION ONCOLOGY | Age: 65
Discharge: HOME | End: 2020-11-02
Attending: RADIOLOGY
Payer: COMMERCIAL

## 2020-11-02 PROCEDURE — 77412 RADIATION TX DELIVERY LVL 3: CPT | Performed by: RADIOLOGY

## 2020-11-02 PROCEDURE — 77417 THER RADIOLOGY PORT IMAGE(S): CPT | Performed by: RADIOLOGY

## 2020-11-02 ASSESSMENT — PAIN DESCRIPTION - FREQUENCY: FREQUENCY: CONTINUOUS

## 2020-11-02 ASSESSMENT — PAIN DESCRIPTION - LOCATION: LOCATION: BACK

## 2020-11-02 ASSESSMENT — PAIN DESCRIPTION - ONSET: ONSET: ON-GOING

## 2020-11-02 ASSESSMENT — PAIN DESCRIPTION - DESCRIPTORS: DESCRIPTORS: CONSTANT;ACHING;SHARP

## 2020-11-02 ASSESSMENT — PAIN DESCRIPTION - PROGRESSION: CLINICAL_PROGRESSION: NOT CHANGED

## 2020-11-02 ASSESSMENT — PAIN DESCRIPTION - PAIN TYPE: TYPE: ACUTE PAIN

## 2020-11-02 ASSESSMENT — PAIN DESCRIPTION - ORIENTATION: ORIENTATION: MID;LOWER

## 2020-11-02 ASSESSMENT — PAIN SCALES - GENERAL: PAINLEVEL_OUTOF10: 8

## 2020-11-02 ASSESSMENT — PAIN - FUNCTIONAL ASSESSMENT: PAIN_FUNCTIONAL_ASSESSMENT: PREVENTS OR INTERFERES SOME ACTIVE ACTIVITIES AND ADLS

## 2020-11-02 NOTE — TELEPHONE ENCOUNTER
I called and spoke with [facility] at [address] the co pay for the [facility] Patch will be cover by Jaci Loya will refund the cash pay price. I called patient and advised.

## 2020-11-02 NOTE — PROGRESS NOTES
Fentanyl Patch 25mcg  approved per [facility] approval # [id number] back date to 7/29/2020-10/27/2020.

## 2020-11-03 ENCOUNTER — HOSPITAL ENCOUNTER (OUTPATIENT)
Dept: RADIATION ONCOLOGY | Age: 65
Discharge: HOME | End: 2020-11-03
Attending: RADIOLOGY
Payer: COMMERCIAL

## 2020-11-03 ENCOUNTER — APPOINTMENT (OUTPATIENT)
Dept: RADIATION ONCOLOGY | Age: 65
End: 2020-11-03
Attending: RADIOLOGY
Payer: COMMERCIAL

## 2020-11-03 PROCEDURE — 77412 RADIATION TX DELIVERY LVL 3: CPT | Performed by: RADIOLOGY

## 2020-11-04 ENCOUNTER — HOSPITAL ENCOUNTER (OUTPATIENT)
Dept: RADIATION ONCOLOGY | Age: 65
Discharge: HOME | End: 2020-11-04
Attending: RADIOLOGY
Payer: COMMERCIAL

## 2020-11-04 ENCOUNTER — OFFICE VISIT (OUTPATIENT)
Dept: INFECTIOUS DISEASES | Age: 65
End: 2020-11-04
Payer: COMMERCIAL

## 2020-11-04 VITALS
HEART RATE: 109 BPM | DIASTOLIC BLOOD PRESSURE: 72 MMHG | WEIGHT: 163.2 LBS | TEMPERATURE: 97.4 F | SYSTOLIC BLOOD PRESSURE: 107 MMHG | BODY MASS INDEX: 24.1 KG/M2

## 2020-11-04 PROCEDURE — 99214 OFFICE O/P EST MOD 30 MIN: CPT | Performed by: INTERNAL MEDICINE

## 2020-11-04 PROCEDURE — 77412 RADIATION TX DELIVERY LVL 3: CPT | Performed by: RADIOLOGY

## 2020-11-04 ASSESSMENT — ENCOUNTER SYMPTOMS
GASTROINTESTINAL NEGATIVE: 1
EYES NEGATIVE: 1
SHORTNESS OF BREATH: 1
COUGH: 1

## 2020-11-04 NOTE — PROGRESS NOTES
11/4/2020     Diagnosis Orders   1. Asymptomatic HIV infection (HCC)  Basic Metabolic Panel    CBC Auto Differential    Flow Cytometry [place] CD4/CD8 Blood    Hepatic Function Panel    HIV-1 RNA, quantitative, PCR   2. Secondary malignant neoplasm of brain ([address]       25 minutes was spent in the encounter; more than 50% of the face-to-face time was spent with the patient providing counseling and coordination of care. DISCUSSION   HIV: Pt is stable/tolerating ART well; currently on Triumeq since 4/1903, complicated by NSCLC with brain metastasis, underwent radiation and chemo treatments. On immunotherapy. Has mets to T1-2. · Neuropathic pain in both legs, unable to tolerate gabapentin  · Discontinue Bactrim.  No components found for: CD4H No components found for: HIVRN    HIV VL< 20 (10/22/2020), CD 4: 362, HIV VL: undetectable   Hepatitis C Ab negative (12/28/17);   Hep B immune, Hep A non-immune-  Remains immune to hep B   Chronic medical problems    Lipid recommendation: being managed by PCP   Labs to be done every 3-6 months: CBC, CD4, VL, BMP, LFTs   Labs to be done once every 12 months (last 6/2020: HCV, Lipids, T-SPOT (negative6/22/2020), RPR, UA   Vaccines: PCV13/PSSV23, Tdap, yearly flu recommended f/up with PCP and Pulmonologist   Colorectal cancer screen: 2016 benign polyps; repeat as directed by GI   Prostate cancer screen done about 3 years ago, no issues at the time    Dexa scan: 8/10/16 showed osteopenia and repeat DEXA scan 1/7/19 ; non-adherent to Calcium/Vit D, owing to pill burden.      Future Appointments   Date Time Provider Rowdy Bellamy   11/4/2020 11:00 AM SCHEDULE, [facility] RAD ONC LINAC 1 [facility] RAD ONC [place]   11/5/2020 11:00 AM SCHEDULE, [facility] RAD ONC LINAC 1 [facility] RAD ONC [place]   11/5/2020 11:15 AM SCHEDULE, [facility] MED ONC TREATMENT [facility] MED ONC [place]   11/5/2020 11:30 AM Florencia Guerra MD [facility] MED ONC [facility]   11/6/2020 11:00 AM SCHEDULE, [facility] RAD ONC LINAC 1 [facility]   12/8/2020  2:30 PM [address] MAXIM Castillo   2/3/2021  9:15 AM Elda Perez MD INFECT DIS  CIERRA Barrientos is a 72 y.o.  male     Chief Complaint: follow up for HIV     HISTORY OF PRESENT ILLNESS:  MSM diagnosed in 2005. Contracted through sex. CD4 vesta : 195. Treated with Truvada and Sustiva, but transitioned to Atripla. Started Triumeq in 2/2018, has tolerated it without any issues. Has not had sex since 2010. Denies recreational drug use.  Tolerating Triumeq  · Continues to have neuropathic pain. 7/8: recently diagnosed with NSCLC with brain metastasis, underwent excision of brain mass at [facility]. Chemotherapy and radiation is planned. Planned chemotherapy daily for 14 weeks. PET scan will help guide therapy  10/7: finished 30 radiation treatments for his lung cancer. Seven chemotherapy rx. Planned commencement of immunotherapy next week. The \"[facility]\" is treating the brain mets, but gave the recommendation for immunotherapy to the cancer center, here, in [place]. 11/4: adherent to medication. However, has worsened pain from lung cancer, in his back. ART Adherance/Tolerability is excellent    Review of Systems   Constitutional: Negative. HENT: Negative. Eyes: Negative. Respiratory: Positive for cough and shortness of breath (due to COPD, improved with inhalers). Cardiovascular: Negative. Gastrointestinal: Negative. Genitourinary: Negative. Musculoskeletal: Negative. Skin: Negative.         Patient Active Problem List    Diagnosis Date Noted    Secondary malignant neoplasm of bone and bone marrow ([address] 09/21/2020    Asymptomatic HIV infection ([address] 07/13/2020    Secondary malignant neoplasm of brain ([address] 07/06/2020    Primary malignant neoplasm of bronchus of right middle lobe ([address] 07/06/2020    Abnormal CXR 06/13/2020     Added automatically from request for surgery [id number]      Seizure ([address] 06/13/2020    S/P ORIF (open reduction internal fixation) fracture 04/14/2020    Closed fracture of left distal radius     Osteopenia of multiple sites 01/03/2019    Polyneuropathy in diseases classified elsewhere ([facility] 02/01/2018    Recurrent major depressive disorder, in partial remission ([facility] 10/13/2017    Pharyngoesophageal dysphagia     Gastroesophageal reflux disease without esophagitis     Exertional dyspnea 08/28/2016    Dizziness 08/28/2016    HIV (human immunodeficiency virus infection) ([facility] 08/04/2016     Overview:   · HIV Diagnosed (When/Where):  [facility]  · HIV Complications: None  · CD4 Arturo:  195  · Current ART (Started when): Atripla (2005)  · Previous ART: NA  · Known Resistance:  None      Pulmonary emphysema ([facility] 08/04/2016    Vitamin D deficiency 01/28/2016    Insomnia 01/28/2016    Former smoker 01/28/2016    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 Intended supply: 30 days 5 patch 0    albuterol-ipratropium (COMBIVENT RESPIMAT)  MCG/ACT AERS inhaler Inhale 1 puff into the lungs every 6 hours 4 g 5    Magic Mouthwash (MIRACLE MOUTHWASH) Swish and spit 5 mLs 4 times daily as needed for Irritation 400 mL 2    TRIUMEQ 600- MG TABS TAKE ONE TABLET BY MOUTH ONCE DAILY. STORE IN ORIGINAL BOTTLE AT ROOM TEMPERATURE. 30 tablet 4    budesonide (PULMICORT FLEXHALER) 180 MCG/ACT AEPB inhaler Inhale 2 puffs into the lungs 2 times daily.  1 each 3    guaiFENesin (MUCINEX) 600 MG extended release tablet Take 1,200 mg by mouth 2 times daily      levETIRAcetam (KEPPRA) 500 MG tablet TAKE 1 TABLET BY MOUTH EVERY 12 HOURS      theophylline (UNIPHYL) 400 MG extended release tablet Take one half tablet by mouth twice daily 90 tablet 3    Respiratory Therapy Supplies unremarkable including HEENT, Constitutional, Cardiovascular, Respiratory, GI, Musculoskeletal, Skin, Endocrine, Hemo/Lympatic, Neurologic    Vital Signs:  Vitals:    11/04/20 0856   BP: 107/72   Site: Left Upper Arm   Position: Sitting   Cuff Size: Medium Adult   Pulse: 109   Temp: 97.4 °F (36.3 °C)   TempSrc: Infrared   Weight: 163 lb 3.2 oz (74 kg)     Wt Readings from Last 3 Encounters:   11/04/20 163 lb 3.2 oz (74 kg)   10/21/20 167 lb 9.6 oz (76 kg)   10/20/20 163 lb (73.9 kg)      Estimated body mass index is 24.1 kg/m² as calculated from the following:    Height as of 10/21/20: 5' 9\" (1.753 m). Weight as of this encounter: 163 lb 3.2 oz (74 kg). Physical Exam:   Gen: alert, NAD  HEENT: AT/NC, no icterus, no oral lesions, extracted teeth, no gingivial disease  Neck: supple, no JVD, no cervical lymphadenopathy, trachea midline, no thyromegaly or thyroid nodules. Chest: reduced air entry bilaterally  Heart: regular rate and rhythm, no murmurs, gallop, or rub. ABD: soft, non-distended, no HSM/masses, non-tender, normoactive bowel sounds  EXT: no cyanosis, no clubbing, no splinter hemorrhages, no edema  NEURO: CN 2-12 intact, no focal deficits, normal gait  Derm: no rashes. Psych: normal affect.      Imaging Studies:   Reviewed - recent studies reviewed

## 2020-11-05 ENCOUNTER — HOSPITAL ENCOUNTER (OUTPATIENT)
Dept: INFUSION THERAPY | Age: 65
Discharge: HOME | End: 2020-11-05
Payer: COMMERCIAL

## 2020-11-05 ENCOUNTER — HOSPITAL ENCOUNTER (OUTPATIENT)
Dept: RADIATION ONCOLOGY | Age: 65
Discharge: HOME | End: 2020-11-05
Attending: RADIOLOGY
Payer: COMMERCIAL

## 2020-11-05 ENCOUNTER — OFFICE VISIT (OUTPATIENT)
Dept: ONCOLOGY | Age: 65
End: 2020-11-05
Payer: COMMERCIAL

## 2020-11-05 VITALS
RESPIRATION RATE: 16 BRPM | WEIGHT: 159 LBS | SYSTOLIC BLOOD PRESSURE: 125 MMHG | TEMPERATURE: 97.8 F | HEIGHT: 69 IN | OXYGEN SATURATION: 97 % | DIASTOLIC BLOOD PRESSURE: 82 MMHG | BODY MASS INDEX: 23.55 KG/M2 | HEART RATE: 123 BPM

## 2020-11-05 VITALS
RESPIRATION RATE: 16 BRPM | HEIGHT: 69 IN | DIASTOLIC BLOOD PRESSURE: 82 MMHG | OXYGEN SATURATION: 97 % | HEART RATE: 123 BPM | BODY MASS INDEX: 23.55 KG/M2 | SYSTOLIC BLOOD PRESSURE: 125 MMHG | WEIGHT: 159 LBS | TEMPERATURE: 97.8 F

## 2020-11-05 DIAGNOSIS — C34.2 MALIGNANT NEOPLASM OF MIDDLE LOBE, BRONCHUS OR LUNG (HCC): ICD-10-CM

## 2020-11-05 DIAGNOSIS — C34.2 PRIMARY MALIGNANT NEOPLASM OF BRONCHUS OF RIGHT MIDDLE LOBE (HCC): Primary | ICD-10-CM

## 2020-11-05 DIAGNOSIS — G47.00 INSOMNIA, UNSPECIFIED TYPE: ICD-10-CM

## 2020-11-05 LAB
ALBUMIN SERPL-MCNC: 3.2 GM/DL (ref 3.4–5)
ALP BLD-CCNC: 139 IU/L (ref 40–128)
ALT SERPL-CCNC: <5 U/L (ref 10–40)
ANION GAP SERPL CALCULATED.3IONS-SCNC: 11 MMOL/L (ref 4–16)
AST SERPL-CCNC: 10 IU/L (ref 15–37)
BASOPHILS ABSOLUTE: 0 K/CU MM
BASOPHILS RELATIVE PERCENT: 0.2 % (ref 0–1)
BILIRUB SERPL-MCNC: 0.4 MG/DL (ref 0–1)
BUN BLDV-MCNC: 10 MG/DL (ref 6–23)
CALCIUM SERPL-MCNC: 8.9 MG/DL (ref 8.3–10.6)
CHLORIDE BLD-SCNC: 99 MMOL/L (ref 99–110)
CO2: 26 MMOL/L (ref 21–32)
CREAT SERPL-MCNC: 0.9 MG/DL (ref 0.9–1.3)
DIFFERENTIAL TYPE: ABNORMAL
EOSINOPHILS ABSOLUTE: 0.1 K/CU MM
EOSINOPHILS RELATIVE PERCENT: 1.2 % (ref 0–3)
GFR AFRICAN AMERICAN: >60 ML/MIN/1.73M2
GFR NON-AFRICAN AMERICAN: >60 ML/MIN/1.73M2
GLUCOSE BLD-MCNC: 102 MG/DL (ref 70–99)
HCT VFR BLD CALC: 36 % (ref 42–52)
HEMOGLOBIN: 11.8 GM/DL (ref 13.5–18)
LYMPHOCYTES ABSOLUTE: 0.4 K/CU MM
LYMPHOCYTES RELATIVE PERCENT: 6.4 % (ref 24–44)
MCH RBC QN AUTO: 29.6 PG (ref 27–31)
MCHC RBC AUTO-ENTMCNC: 32.8 % (ref 32–36)
MCV RBC AUTO: 90.5 FL (ref 78–100)
MONOCYTES ABSOLUTE: 1.2 K/CU MM
MONOCYTES RELATIVE PERCENT: 20.4 % (ref 0–4)
PDW BLD-RTO: 19.1 % (ref 11.7–14.9)
PLATELET # BLD: 275 K/CU MM (ref 140–440)
PMV BLD AUTO: 8.1 FL (ref 7.5–11.1)
POTASSIUM SERPL-SCNC: 4.6 MMOL/L (ref 3.5–5.1)
RBC # BLD: 3.98 M/CU MM (ref 4.6–6.2)
SEGMENTED NEUTROPHILS ABSOLUTE COUNT: 4 K/CU MM
SEGMENTED NEUTROPHILS RELATIVE PERCENT: 71.8 % (ref 36–66)
SODIUM BLD-SCNC: 136 MMOL/L (ref 135–145)
TOTAL PROTEIN: 5.7 GM/DL (ref 6.4–8.2)
TSH HIGH SENSITIVITY: 3.68 UIU/ML (ref 0.27–4.2)
WBC # BLD: 5.6 K/CU MM (ref 4–10.5)

## 2020-11-05 PROCEDURE — 77412 RADIATION TX DELIVERY LVL 3: CPT | Performed by: RADIOLOGY

## 2020-11-05 PROCEDURE — 96413 CHEMO IV INFUSION 1 HR: CPT

## 2020-11-05 PROCEDURE — 36415 COLL VENOUS BLD VENIPUNCTURE: CPT

## 2020-11-05 PROCEDURE — 80053 COMPREHEN METABOLIC PANEL: CPT

## 2020-11-05 PROCEDURE — 96375 TX/PRO/DX INJ NEW DRUG ADDON: CPT

## 2020-11-05 PROCEDURE — 82565 ASSAY OF CREATININE: CPT

## 2020-11-05 PROCEDURE — 85025 COMPLETE CBC W/AUTO DIFF WBC: CPT

## 2020-11-05 PROCEDURE — 99214 OFFICE O/P EST MOD 30 MIN: CPT | Performed by: INTERNAL MEDICINE

## 2020-11-05 PROCEDURE — 2580000003 HC RX 258: Performed by: INTERNAL MEDICINE

## 2020-11-05 PROCEDURE — 84443 ASSAY THYROID STIM HORMONE: CPT

## 2020-11-05 PROCEDURE — 6360000002 HC RX W HCPCS: Performed by: INTERNAL MEDICINE

## 2020-11-05 RX ORDER — HEPARIN SODIUM (PORCINE) LOCK FLUSH IV SOLN 100 UNIT/ML 100 UNIT/ML
500 SOLUTION INTRAVENOUS PRN
Status: CANCELLED | OUTPATIENT
Start: 2020-12-24

## 2020-11-05 RX ORDER — LEVOFLOXACIN 500 MG/1
500 TABLET, FILM COATED ORAL DAILY
Qty: 7 TABLET | Refills: 0 | Status: SHIPPED | OUTPATIENT
Start: 2020-11-05 | End: 2020-11-12

## 2020-11-05 RX ORDER — SODIUM CHLORIDE 9 MG/ML
20 INJECTION, SOLUTION INTRAVENOUS ONCE
Status: CANCELLED | OUTPATIENT
Start: 2020-12-03

## 2020-11-05 RX ORDER — FENTANYL 50 UG/H
1 PATCH TRANSDERMAL
Qty: 5 PATCH | Refills: 0 | Status: SHIPPED | OUTPATIENT
Start: 2020-11-05 | End: 2020-11-06 | Stop reason: SDUPTHER

## 2020-11-05 RX ORDER — MEPERIDINE HYDROCHLORIDE 50 MG/ML
12.5 INJECTION INTRAMUSCULAR; INTRAVENOUS; SUBCUTANEOUS ONCE
Status: CANCELLED | OUTPATIENT
Start: 2020-12-03

## 2020-11-05 RX ORDER — METHYLPREDNISOLONE SODIUM SUCCINATE 125 MG/2ML
125 INJECTION, POWDER, LYOPHILIZED, FOR SOLUTION INTRAMUSCULAR; INTRAVENOUS ONCE
Status: CANCELLED | OUTPATIENT
Start: 2020-12-03

## 2020-11-05 RX ORDER — SODIUM CHLORIDE 0.9 % (FLUSH) 0.9 %
5 SYRINGE (ML) INJECTION PRN
Status: CANCELLED | OUTPATIENT
Start: 2020-11-05

## 2020-11-05 RX ORDER — METHYLPREDNISOLONE SODIUM SUCCINATE 125 MG/2ML
125 INJECTION, POWDER, LYOPHILIZED, FOR SOLUTION INTRAMUSCULAR; INTRAVENOUS ONCE
Status: CANCELLED | OUTPATIENT
Start: 2020-11-05

## 2020-11-05 RX ORDER — SODIUM CHLORIDE 0.9 % (FLUSH) 0.9 %
10 SYRINGE (ML) INJECTION PRN
Status: CANCELLED | OUTPATIENT
Start: 2020-12-24

## 2020-11-05 RX ORDER — SODIUM CHLORIDE 9 MG/ML
20 INJECTION, SOLUTION INTRAVENOUS ONCE
Status: CANCELLED | OUTPATIENT
Start: 2020-11-05

## 2020-11-05 RX ORDER — FENTANYL 25 UG/H
1 PATCH TRANSDERMAL
Qty: 5 PATCH | Refills: 0 | Status: CANCELLED | OUTPATIENT
Start: 2020-11-05 | End: 2020-11-20

## 2020-11-05 RX ORDER — SODIUM CHLORIDE 0.9 % (FLUSH) 0.9 %
10 SYRINGE (ML) INJECTION PRN
Status: CANCELLED | OUTPATIENT
Start: 2020-12-03

## 2020-11-05 RX ORDER — SODIUM CHLORIDE 9 MG/ML
INJECTION, SOLUTION INTRAVENOUS CONTINUOUS
Status: CANCELLED | OUTPATIENT
Start: 2020-12-24

## 2020-11-05 RX ORDER — DEXAMETHASONE SODIUM PHOSPHATE 10 MG/ML
8 INJECTION, SOLUTION INTRAMUSCULAR; INTRAVENOUS ONCE
Status: CANCELLED
Start: 2020-11-05

## 2020-11-05 RX ORDER — SODIUM CHLORIDE 0.9 % (FLUSH) 0.9 %
10 SYRINGE (ML) INJECTION PRN
Status: CANCELLED | OUTPATIENT
Start: 2020-11-05

## 2020-11-05 RX ORDER — EPINEPHRINE 1 MG/ML
0.3 INJECTION, SOLUTION, CONCENTRATE INTRAVENOUS PRN
Status: CANCELLED | OUTPATIENT
Start: 2020-12-03

## 2020-11-05 RX ORDER — EPINEPHRINE 1 MG/ML
0.3 INJECTION, SOLUTION, CONCENTRATE INTRAVENOUS PRN
Status: CANCELLED | OUTPATIENT
Start: 2020-12-24

## 2020-11-05 RX ORDER — HEPARIN SODIUM (PORCINE) LOCK FLUSH IV SOLN 100 UNIT/ML 100 UNIT/ML
500 SOLUTION INTRAVENOUS PRN
Status: DISCONTINUED | OUTPATIENT
Start: 2020-11-05 | End: 2020-11-06 | Stop reason: HOSPADM

## 2020-11-05 RX ORDER — MEPERIDINE HYDROCHLORIDE 50 MG/ML
12.5 INJECTION INTRAMUSCULAR; INTRAVENOUS; SUBCUTANEOUS ONCE
Status: CANCELLED | OUTPATIENT
Start: 2020-12-24

## 2020-11-05 RX ORDER — DEXAMETHASONE SODIUM PHOSPHATE 4 MG/ML
8 INJECTION, SOLUTION INTRA-ARTICULAR; INTRALESIONAL; INTRAMUSCULAR; INTRAVENOUS; SOFT TISSUE ONCE
Status: COMPLETED | OUTPATIENT
Start: 2020-11-05 | End: 2020-11-05

## 2020-11-05 RX ORDER — HYDROCODONE BITARTRATE AND ACETAMINOPHEN 5; 325 MG/1; MG/1
1 TABLET ORAL EVERY 4 HOURS PRN
Qty: 90 TABLET | Refills: 0 | Status: SHIPPED | OUTPATIENT
Start: 2020-11-05 | End: 2020-11-18 | Stop reason: SDUPTHER

## 2020-11-05 RX ORDER — 0.9 % SODIUM CHLORIDE 0.9 %
1000 INTRAVENOUS SOLUTION INTRAVENOUS ONCE
Status: CANCELLED | OUTPATIENT
Start: 2020-11-05

## 2020-11-05 RX ORDER — ONDANSETRON 2 MG/ML
8 INJECTION INTRAMUSCULAR; INTRAVENOUS ONCE
Status: COMPLETED | OUTPATIENT
Start: 2020-11-05 | End: 2020-11-05

## 2020-11-05 RX ORDER — ONDANSETRON 2 MG/ML
8 INJECTION INTRAMUSCULAR; INTRAVENOUS ONCE
Status: CANCELLED
Start: 2020-11-05

## 2020-11-05 RX ORDER — MEPERIDINE HYDROCHLORIDE 50 MG/ML
12.5 INJECTION INTRAMUSCULAR; INTRAVENOUS; SUBCUTANEOUS ONCE
Status: CANCELLED | OUTPATIENT
Start: 2020-11-05

## 2020-11-05 RX ORDER — HEPARIN SODIUM (PORCINE) LOCK FLUSH IV SOLN 100 UNIT/ML 100 UNIT/ML
500 SOLUTION INTRAVENOUS PRN
Status: CANCELLED | OUTPATIENT
Start: 2020-12-03

## 2020-11-05 RX ORDER — METHYLPREDNISOLONE SODIUM SUCCINATE 125 MG/2ML
125 INJECTION, POWDER, LYOPHILIZED, FOR SOLUTION INTRAMUSCULAR; INTRAVENOUS ONCE
Status: CANCELLED | OUTPATIENT
Start: 2020-12-24

## 2020-11-05 RX ORDER — ONDANSETRON HYDROCHLORIDE 8 MG/1
TABLET, FILM COATED ORAL
COMMUNITY
Start: 2020-10-20 | End: 2020-11-13

## 2020-11-05 RX ORDER — HEPARIN SODIUM (PORCINE) LOCK FLUSH IV SOLN 100 UNIT/ML 100 UNIT/ML
500 SOLUTION INTRAVENOUS PRN
Status: CANCELLED | OUTPATIENT
Start: 2020-11-05

## 2020-11-05 RX ORDER — DIPHENHYDRAMINE HYDROCHLORIDE 50 MG/ML
50 INJECTION INTRAMUSCULAR; INTRAVENOUS ONCE
Status: CANCELLED | OUTPATIENT
Start: 2020-12-03

## 2020-11-05 RX ORDER — SODIUM CHLORIDE 0.9 % (FLUSH) 0.9 %
5 SYRINGE (ML) INJECTION PRN
Status: CANCELLED | OUTPATIENT
Start: 2020-12-24

## 2020-11-05 RX ORDER — 0.9 % SODIUM CHLORIDE 0.9 %
1000 INTRAVENOUS SOLUTION INTRAVENOUS ONCE
Status: COMPLETED | OUTPATIENT
Start: 2020-11-05 | End: 2020-11-05

## 2020-11-05 RX ORDER — DIPHENHYDRAMINE HYDROCHLORIDE 50 MG/ML
50 INJECTION INTRAMUSCULAR; INTRAVENOUS ONCE
Status: CANCELLED | OUTPATIENT
Start: 2020-11-05

## 2020-11-05 RX ORDER — SODIUM CHLORIDE 9 MG/ML
INJECTION, SOLUTION INTRAVENOUS CONTINUOUS
Status: CANCELLED | OUTPATIENT
Start: 2020-12-03

## 2020-11-05 RX ORDER — DIPHENHYDRAMINE HYDROCHLORIDE 50 MG/ML
50 INJECTION INTRAMUSCULAR; INTRAVENOUS ONCE
Status: CANCELLED | OUTPATIENT
Start: 2020-12-24

## 2020-11-05 RX ORDER — EPINEPHRINE 1 MG/ML
0.3 INJECTION, SOLUTION, CONCENTRATE INTRAVENOUS PRN
Status: CANCELLED | OUTPATIENT
Start: 2020-11-05

## 2020-11-05 RX ORDER — SODIUM CHLORIDE 0.9 % (FLUSH) 0.9 %
10 SYRINGE (ML) INJECTION PRN
Status: DISCONTINUED | OUTPATIENT
Start: 2020-11-05 | End: 2020-11-06 | Stop reason: HOSPADM

## 2020-11-05 RX ORDER — SODIUM CHLORIDE 9 MG/ML
20 INJECTION, SOLUTION INTRAVENOUS ONCE
Status: CANCELLED | OUTPATIENT
Start: 2020-12-24

## 2020-11-05 RX ORDER — SODIUM CHLORIDE 9 MG/ML
INJECTION, SOLUTION INTRAVENOUS CONTINUOUS
Status: CANCELLED | OUTPATIENT
Start: 2020-11-05

## 2020-11-05 RX ORDER — SODIUM CHLORIDE 0.9 % (FLUSH) 0.9 %
5 SYRINGE (ML) INJECTION PRN
Status: CANCELLED | OUTPATIENT
Start: 2020-12-03

## 2020-11-05 RX ADMIN — SODIUM CHLORIDE, PRESERVATIVE FREE 10 ML: 5 INJECTION INTRAVENOUS at 14:35

## 2020-11-05 RX ADMIN — SODIUM CHLORIDE 200 MG: 9 INJECTION, SOLUTION INTRAVENOUS at 12:44

## 2020-11-05 RX ADMIN — Medication 500 UNITS: at 14:35

## 2020-11-05 RX ADMIN — ONDANSETRON 8 MG: 2 INJECTION INTRAMUSCULAR; INTRAVENOUS at 12:35

## 2020-11-05 RX ADMIN — SODIUM CHLORIDE 1000 ML: 9 INJECTION, SOLUTION INTRAVENOUS at 12:35

## 2020-11-05 RX ADMIN — DEXAMETHASONE SODIUM PHOSPHATE 8 MG: 4 INJECTION, SOLUTION INTRAMUSCULAR; INTRAVENOUS at 12:35

## 2020-11-05 NOTE — PROGRESS NOTES
MA Rooming Questions  Patient: Aftab Farr  MRN: [id number]    Date: 11/5/2020        1. Do you have any new issues? yes - PAIN IN BACK- PATCHED NOT HELPING         2. Do you need any refills on medications?    no    3. Have you had any imaging done since your last visit? 4. Have you been hospitalized or seen in the emergency room since your last visit here?   no    5. Did the patient have a depression screening completed today?  NO    No data recorded     PHQ-9 Given to (if applicable):               PHQ-9 Score (if applicable):                     [] Positive     []  Negative              Does question #9 need addressed (if applicable)                     [] Yes    []  No               Imani Owen MA

## 2020-11-05 NOTE — PROGRESS NOTES
Pt. Here for treatment. Denies any questions or concerns. Pt. Tolerated treatment without incident. Patient's status assessed and documented appropriately. All labs and required results were also reviewed today. Treatment parameters have been reviewed. Today's treatment has been approved by the provider. Treatment orders and medication sequencing (when applicable) was verified by 2 registered nurses. The treatment plan was confirmed with the patient prior to administration, and the patient understands the need to report any treatment-related symptoms. Prior to administration, when applicable, the following 8 elements of medication administration were reviewed with 2nd Registered Nurse prior to dosing: drug name, drug dose, infusion volume when prepared in a syringe, rate of administration, expiration dates and/or times, appearance and integrity of drug(s), and rate of pump for infusion. The 5 rights of medication administration have been verified.

## 2020-11-06 ENCOUNTER — HOSPITAL ENCOUNTER (OUTPATIENT)
Dept: RADIATION ONCOLOGY | Age: 65
Discharge: HOME | End: 2020-11-06
Attending: RADIOLOGY
Payer: COMMERCIAL

## 2020-11-06 PROCEDURE — 77427 RADIATION TX MANAGEMENT X5: CPT | Performed by: RADIOLOGY

## 2020-11-06 PROCEDURE — 77412 RADIATION TX DELIVERY LVL 3: CPT | Performed by: RADIOLOGY

## 2020-11-06 PROCEDURE — 77336 RADIATION PHYSICS CONSULT: CPT | Performed by: RADIOLOGY

## 2020-11-06 RX ORDER — FENTANYL 50 UG/H
1 PATCH TRANSDERMAL
Qty: 5 PATCH | Refills: 0 | Status: SHIPPED | OUTPATIENT
Start: 2020-11-06 | End: 2020-11-18 | Stop reason: SDUPTHER

## 2020-11-06 NOTE — TELEPHONE ENCOUNTER
Patient called and states [facility] does not have the fentanyl 50mcg in stock. Patient would like Rx sent to [facility] just for this refill.

## 2020-11-06 NOTE — TELEPHONE ENCOUNTER
I called and spoke to Melvin Chappell at [facility] and verbally canceled Fentanyl 50mcg as RX was sent to [facility] [address]

## 2020-11-09 NOTE — PROGRESS NOTES
Radiation Oncology  Treatment Completion Summary  Encounter Date: 2020 10:20 AM    Mr. Kristofer Barrientos is a 72 y.o. male  : 1955  MRN: [id number]  Acct Number: [de-identified]      FOLLOW UP PHYSICIANS:   Primary Care: MD Ryan Cheatham M.D.   [facility] [place] [address]          DIAGNOSIS:  Cancer Staging  Primary malignant neoplasm of bronchus of right middle lobe [facility]  Staging form: Lung, AJCC 8th Edition  - Clinical: Stage IV (pM1) - Signed by Tricia Moreno MD on 2020         TREATMENT COURSE:   Oncology History   Secondary malignant neoplasm of brain ([address]   2020 Initial Diagnosis    Secondary malignant neoplasm of brain ([address]     2020 Surgery    Resection left parietal metastasis     7/15/2020 - 2020 Radiation    L Parietal Met: 24Gy (3 x 8Gy) STRS at [facility]     Primary malignant neoplasm of bronchus of right middle lobe ([address]   2020 Initial Diagnosis    Primary malignant neoplasm of bronchus of right middle lobe ([address]     2020 - 2020 Radiation    Rt Chest Lung Mass/LNs: 60Gy IMRT         Secondary malignant neoplasm of bone and bone marrow ([address]   2020 Initial Diagnosis    Secondary malignant neoplasm of bone and bone marrow ([address]       10/26/2020 - 2020 Radiation    T9-L1: 30Gy           HISTORY:  Kristofer Barrientos is a 72 y.o. male with the above referenced diagnosis. Complete details on history of present illness please see my initial consultation note. The patient has recently completed a course of palliative T9 through L1 radiation therapy and what follows is a description of the treatments received:    ANATOMIC SITE: T9 through L1   BEAM ORIENTATION: PA  ENERGY: 18MV photons  DOSE PER FRACTION: 300cGy  TOTAL DOSE: 3000cGy    ELAPSED DAYS: 11    TREATMENT TOLERANCE:   Overall, the patient tolerated radiation therapy quite well. The patient's energy level was fairly well-maintained throughout the course of treatments. No significant skin erythema developed. His pain level was fairly stable throughout courses treatments. No significant dysphagia or odynophagia developed. FOLLOW-UP PLANS:   The patient is to return to see me in 1 month or to return sooner as needed.       Electronically signed by Hannah Marques MD on 11/9/2020 at 10:20 AM

## 2020-11-13 RX ORDER — ONDANSETRON HYDROCHLORIDE 8 MG/1
TABLET, FILM COATED ORAL
Qty: 30 TABLET | Refills: 0 | Status: SHIPPED | OUTPATIENT
Start: 2020-11-13

## 2020-11-18 RX ORDER — FENTANYL 50 UG/H
1 PATCH TRANSDERMAL
Qty: 5 PATCH | Refills: 0 | Status: SHIPPED | OUTPATIENT
Start: 2020-11-18 | End: 2020-12-03

## 2020-11-18 RX ORDER — HYDROCODONE BITARTRATE AND ACETAMINOPHEN 5; 325 MG/1; MG/1
1 TABLET ORAL EVERY 4 HOURS PRN
Qty: 90 TABLET | Refills: 0 | Status: SHIPPED | OUTPATIENT
Start: 2020-11-18 | End: 2020-12-03

## 2020-12-02 NOTE — PROGRESS NOTES
Patient Name: Resa Bumpers  Patient : 1955  Patient MRN: [id number]     Primary Oncologist: Elba Branch MD  Referring Provider: Kang Young MD     Date of Service: 12/3/2020      Chief Complaint:   Chief Complaint   Patient presents with    Follow-up    Chemotherapy     Patient Active Problem List:     Secondary malignant neoplasm of brain      Primary malignant neoplasm of bronchus of right middle lobe      Adenocarcinoma of lung, stage 4, right      HPI:   Sarah Vivas is a 40-year-old very pleasant gentleman with medical history significant for HIV/AIDS, COPD and GERD referred to me on 2020 for evaluation of his metastatic lung cancer. He initially presented to [facility] with seizure on 2020. CT scan of the head showed vasogenic edema in left parietal lobe concerning for underlying mass. CT scan of the chest showed postobstructive collapse/consolidation of the right middle lobe, endobronchial lesion cannot be excluded and 16 mm left upper lobe nodule. He was then transferred to [facility] ED for further workup and management. MRI of the brain with and without contrast done on 20 showed 1.2 cm mass in left parietal lobe with surrounding vasogenic edema, regional mass effect and effacement of atrium of left lateral ventricle. He underwent bronchoscopy, EBUS on 6/15/20 and it showed collapse of RML lateral segment bronchus. Final pathology from EBUS guided biopsy of subcarinal LN was consistent with  lung adenocarcinoma. PD-L1 was positive (95%). He then underwent resection of the left parietal lobe mass on 20 and final pathology was consistent with adenocarcinoma of lung primary. Molecular testing revealed that MET amplification was detected. MRI of the brain done on 20 showed postsurgical changes related to mass resection in the left parietal lobe.  There is small rim of contrast enhancement along the resection cavity margin which may be treatment related, and attention on follow-up imaging is recommended. Surrounding vasogenic edema and mass effect have improved. New tiny focus of restricted diffusion in the left occipital lobe which could be a recent infarct (acute to subacute). No hemorrhage or mass effect associated with this. He was subsequently referred to me for further evaluation. He was seen by radiation oncologist at [address] [place] [address] and he received SBRT to his brain surgical site between 7/15 and 7/17/2020. PET/CT scan done on July 9, 2020 showed Intense metabolic activity associated with the masslike opacification of the right middle lobe which may represent primary lung cancer and/or postobstructive pneumonia. Metabolically active right hilar and subcarinal lymph node metastasis. Solitary metabolically active skeletal metastasis in the left aspect of the sacrum. Definitive concurrent chemoradiation therapy was started since 8/18/2020 (started RT on 8/18/20 and chemo on 8/19/20) and he completed his last RT on 9/29/20. First line chemotherapy with Janice Squibb was started on 10/15/2020. On November 5, 2020, he presented to me for follow-up. I have been following him for metastatic non small cell lung cancer. He is s/p surgery followed by SBRT to his solitary brain metastasis. He is also status post definitive concurrent chemoradiation therapy to his lung lesions. He has been on first line chemotherapy with pembrolizumab since 10/15/20. Mr. Teresita Magaña was seen at [address] [place] [address] with MRI 11/19/2020 which showed new enhancing lesions in the left frontal, right parietal, and right occipital lobes. He was admitted to the hospital for LE weakness and intractable pain. He was started on decadron. He had CT simulation 11/20/2020 and received stereotactic radiation 11/25. Mr. Teresita Magaña reports he does not want to have any more radiation at this point. He could not tolerate the face mask.      MRI thoracic and lumbar spine with multiple osseous metastatic lesions without cord compression. He has ongoing weakness to his bilateral lower extremities with right having more weakness than his left. He is able to complete ADLs at home with assistive devices. He refused home care or physical therapy at this time. He does report adequate support from his family and feels safe at home. He reports that he has ongoing pain that is not well controlled. We will increase his fentanyl patch to 75 mcg from today. He will use 15 mg oxycodone every four hours as needed. He reports he is mostly bed bound due to pain at this time. His primary concern presently is pain control and quality of life. I made a referral for him to see Dr. Heraclio Montes for palliative care. He would like to continue treatment today with Kevin ([place]). We will review labs and proceed with treatment today as scheduled. I recommended to follow-up with Dr. Gem Jackman for possible palliative radiation therapy to his spine metastatic lesion. Past Medical History  Significant for  1. HIV/AIDS since 2005  2. COPD  3. GERD  4. 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 Right inguinal hernia repair   2. Tonsillectomy in 1960s  3. Left wrist fracture surgery in 2/28/2020  4. Brain metastasis removal on June 2020    Allergies  No known medication allergies    Social History  He is a former smoker and he quit smoking in 2005. He used to smoke 1 pack per day for about 32 years. He is currently living in [facility]. Family History  Significant for breast cancer in his mother, melanoma in his maternal uncle, lung cancer in his maternal grandmother and leukemia in his maternal grandfather. Review of Systems: \"Per interval history; otherwise 10 point ROS is negative. \"  His energy level is fair, appetite, and sleep are stable. He doesn't have fever, chills, night sweats, cough, shortness of breath, chest pain, hemoptysis, or palpitations.   His bowel and bladder functions are normal. He denies nausea, vomiting, abdominal pain, diarrhea, constipation, dysuria, loss of appetite, or weight loss. He denies neuropathy and he does not have bleeding or clotting issues. He doesn't have any pain in his body. Denies anxiety or depression. The rest of the systems are unremarkable. Vital Signs:  BP 97/67 (Site: Left Upper Arm, Position: Sitting, Cuff Size: Medium Adult)   Pulse 91   Resp 16   Ht 5' 9\" (1.753 m)   Wt 160 lb 3.2 oz (72.7 kg)   SpO2 96%   BMI 23.66 kg/m²     Physical Exam:  CONSTITUTIONAL: awake, alert, cooperative, no apparent distress   EYES: pupils equal, round and reactive to light, sclera clear and conjunctiva normal  ENT: Normocephalic, without obvious abnormality, atraumatic  NECK: supple, symmetrical, no jugular venous distension and no carotid bruits   HEMATOLOGIC/LYMPHATIC: no cervical, supraclavicular or axillary lymphadenopathy   LUNGS: no rhonchi, no crackles, no increased work of breathing and clear to auscultation, no wheezes, VBS,    CARDIOVASCULAR: regular rate and rhythm, normal S1 and S2, no murmur noted  ABDOMEN: soft, non-distended, normal bowel sounds x 4, non-tender, no masses palpated, no hepatosplenomegaly   MUSCULOSKELETAL: full range of motion noted, tone is normal  NEUROLOGIC: awake, alert, oriented to name, place and time. Motor skills grossly intact. SKIN: Normal skin color, texture, turgor and no jaundice.  appears intact   EXTREMITIES: no clubbing, no cyanosis, no leg swelling, no LE edema,      Labs:  Hematology:  Lab Results   Component Value Date    WBC 5.6 11/05/2020    RBC 3.98 (L) 11/05/2020    HGB 11.8 (L) 11/05/2020    HCT 36.0 (L) 11/05/2020    MCV 90.5 11/05/2020    MCH 29.6 11/05/2020    MCHC 32.8 11/05/2020    RDW 19.1 (H) 11/05/2020     11/05/2020    MPV 8.1 11/05/2020    SEGSPCT 71.8 (H) 11/05/2020    EOSRELPCT 1.2 11/05/2020    BASOPCT 0.2 11/05/2020    LYMPHOPCT 6.4 (L) 11/05/2020    MONOPCT 20.4 (H) 11/05/2020    SEGSABS 4.0 11/05/2020 EOSABS 0.1 11/05/2020    BASOSABS 0.0 11/05/2020    LYMPHSABS 0.4 11/05/2020    MONOSABS 1.2 11/05/2020    DIFFTYPE AUTOMATED DIFFERENTIAL 11/05/2020     No results found for: ESR  Chemistry:  Lab Results   Component Value Date     11/05/2020    K 4.6 11/05/2020    CL 99 11/05/2020    CO2 26 11/05/2020    BUN 10 11/05/2020    CREATININE 0.9 11/05/2020    GLUCOSE 102 (H) 11/05/2020    CALCIUM 8.9 11/05/2020    PROT 5.7 (L) 11/05/2020    LABALBU 3.2 (L) 11/05/2020    BILITOT 0.4 11/05/2020    ALKPHOS 139 (H) 11/05/2020    AST 10 (L) 11/05/2020    ALT <5 (L) 11/05/2020    LABGLOM >60 11/05/2020    GFRAA >60 11/05/2020    AGRATIO 1.8 10/22/2020    GLOB 2.0 10/22/2020    PHOS 3.7 08/31/2016    MG 2.5 (H) 06/13/2020    POCCA 1.10 (L) 10/13/2020    POCGLU 115 (H) 10/13/2020     No results found for: MMA, LDH, HOMOCYSTEINE  No components found for: LD  Lab Results   Component Value Date    TSHHS 3.680 11/05/2020    T4FREE 1.47 08/29/2016     Immunology:  Lab Results   Component Value Date    PROT 5.7 (L) 11/05/2020     No results found for: GENEVA Wallace  No results found for: B2M  Coagulation Panel:  Lab Results   Component Value Date    PROTIME 11.0 04/23/2013    INR 1.01 04/23/2013    APTT 29.1 06/05/2012    DDIMER 297 (H) 09/20/2015     Anemia Panel:  Lab Results   Component Value Date    WDBWWKFJ43 388.0 08/29/2016    FOLATE 8.8 08/29/2016     Tumor Markers:  Lab Results   Component Value Date    CEA 3.4 08/30/2016     17.7 08/30/2016    PSA 2.03 03/13/2013     Observations:  No data recorded        Assessment & Plan:   Metastatic non-small cell lung cancer - adenocarcinoma  Brain metastasis - s/p excision    PLAN  Brett Kathleen is a 80-year-old very pleasant gentleman who was found to have right middle lobe lung mass with left parietal lobe brain metastasis when he presented with seizure on June 13, 2020.      Further work ups with bronchoscopy, EBUS on 6/15/20 showed collapse of RML lateral segment bronchus. Final pathology from EBUS guided biopsy of subcarinal lymph node was consistent with  lung adenocarcinoma. PD-L1 was positive (95%). He was subsequently referred to me for further evaluation. He was seen by radiation oncologist at [facility] and he received SBRT to his brain surgical site between 7/15 and 7/17/2020. PET/CT scan done on July 9, 2020 showed Intense metabolic activity associated with the masslike opacification of the right middle lobe which may represent primary lung cancer and/or postobstructive pneumonia. Metabolically active right hilar and subcarinal lymph node metastasis. Solitary metabolically active skeletal metastasis in the left aspect of the sacrum. Definitive concurrent chemoradiation therapy was started since 8/18/2020 (started RT on 8/18/20 and chemo on 8/19/20) and he completed his last RT on 9/29/20. First line chemotherapy with [place]andelstrook 145 was started on 10/15/2020. On December 3, 2020, he presented to me for follow-up. I have been following him for metastatic non small cell lung cancer. He is s/p surgery followed by SBRT to his solitary brain metastasis. He is also status post definitive concurrent chemoradiation therapy to his lung lesions. He has been on first line chemotherapy with pembrolizumab since 10/15/20. Mr. Rainer Galloway was seen at [facility] with MRI 11/19/2020 which showed new enhancing lesions in the left frontal, right parietal, and right occipital lobes. He was admitted to the hospital for LE weakness and intractable pain. He was started on decadron. He had CT simulation 11/20/2020 and received stereotactic radiation 11/25. Mr. Rainer Galloway reports he does not want to have any more radiation at this point. He could not tolerate the face mask. MRI thoracic and lumbar spine with multiple osseous metastatic lesions without cord compression. He has ongoing weakness to his bilateral lower extremities with right having more weakness than his left.  He is

## 2020-12-03 ENCOUNTER — HOSPITAL ENCOUNTER (OUTPATIENT)
Dept: INFUSION THERAPY | Age: 65
Discharge: HOME | End: 2020-12-03
Payer: COMMERCIAL

## 2020-12-03 ENCOUNTER — OFFICE VISIT (OUTPATIENT)
Dept: ONCOLOGY | Age: 65
End: 2020-12-03
Payer: COMMERCIAL

## 2020-12-03 VITALS
WEIGHT: 160.2 LBS | BODY MASS INDEX: 23.73 KG/M2 | HEART RATE: 91 BPM | OXYGEN SATURATION: 96 % | HEIGHT: 69 IN | RESPIRATION RATE: 16 BRPM | SYSTOLIC BLOOD PRESSURE: 97 MMHG | DIASTOLIC BLOOD PRESSURE: 67 MMHG

## 2020-12-03 DIAGNOSIS — Z21 ASYMPTOMATIC HIV INFECTION (HCC): ICD-10-CM

## 2020-12-03 DIAGNOSIS — G47.00 INSOMNIA, UNSPECIFIED TYPE: ICD-10-CM

## 2020-12-03 DIAGNOSIS — R06.09 EXERTIONAL DYSPNEA: ICD-10-CM

## 2020-12-03 DIAGNOSIS — C34.2 PRIMARY MALIGNANT NEOPLASM OF BRONCHUS OF RIGHT MIDDLE LOBE (HCC): Primary | ICD-10-CM

## 2020-12-03 LAB
ALBUMIN SERPL-MCNC: 3.3 GM/DL (ref 3.4–5)
ALP BLD-CCNC: 139 IU/L (ref 40–128)
ALT SERPL-CCNC: 22 U/L (ref 10–40)
ANION GAP SERPL CALCULATED.3IONS-SCNC: 11 MMOL/L (ref 4–16)
AST SERPL-CCNC: 22 IU/L (ref 15–37)
BASOPHILS ABSOLUTE: 0 K/CU MM
BASOPHILS RELATIVE PERCENT: 0.2 % (ref 0–1)
BILIRUB SERPL-MCNC: 0.4 MG/DL (ref 0–1)
BUN BLDV-MCNC: 17 MG/DL (ref 6–23)
CALCIUM SERPL-MCNC: 8.5 MG/DL (ref 8.3–10.6)
CHLORIDE BLD-SCNC: 100 MMOL/L (ref 99–110)
CO2: 26 MMOL/L (ref 21–32)
CREAT SERPL-MCNC: 0.8 MG/DL (ref 0.9–1.3)
DIFFERENTIAL TYPE: ABNORMAL
EOSINOPHILS ABSOLUTE: 0 K/CU MM
EOSINOPHILS RELATIVE PERCENT: 0.4 % (ref 0–3)
GFR AFRICAN AMERICAN: >60 ML/MIN/1.73M2
GFR NON-AFRICAN AMERICAN: >60 ML/MIN/1.73M2
GLUCOSE BLD-MCNC: 74 MG/DL (ref 70–99)
HCT VFR BLD CALC: 37.3 % (ref 42–52)
HEMOGLOBIN: 11.9 GM/DL (ref 13.5–18)
LYMPHOCYTES ABSOLUTE: 0.6 K/CU MM
LYMPHOCYTES RELATIVE PERCENT: 5.3 % (ref 24–44)
MCH RBC QN AUTO: 30.1 PG (ref 27–31)
MCHC RBC AUTO-ENTMCNC: 31.9 % (ref 32–36)
MCV RBC AUTO: 94.2 FL (ref 78–100)
MONOCYTES ABSOLUTE: 1.5 K/CU MM
MONOCYTES RELATIVE PERCENT: 13.5 % (ref 0–4)
PDW BLD-RTO: 17 % (ref 11.7–14.9)
PLATELET # BLD: 166 K/CU MM (ref 140–440)
PMV BLD AUTO: 9.2 FL (ref 7.5–11.1)
POTASSIUM SERPL-SCNC: 3.8 MMOL/L (ref 3.5–5.1)
RBC # BLD: 3.96 M/CU MM (ref 4.6–6.2)
SEGMENTED NEUTROPHILS ABSOLUTE COUNT: 9.2 K/CU MM
SEGMENTED NEUTROPHILS RELATIVE PERCENT: 80.6 % (ref 36–66)
SODIUM BLD-SCNC: 137 MMOL/L (ref 135–145)
T4 FREE: 1.6 NG/DL (ref 0.9–1.8)
TOTAL PROTEIN: 5.7 GM/DL (ref 6.4–8.2)
WBC # BLD: 11.4 K/CU MM (ref 4–10.5)

## 2020-12-03 PROCEDURE — 2580000003 HC RX 258: Performed by: INTERNAL MEDICINE

## 2020-12-03 PROCEDURE — 36591 DRAW BLOOD OFF VENOUS DEVICE: CPT

## 2020-12-03 PROCEDURE — 99214 OFFICE O/P EST MOD 30 MIN: CPT | Performed by: NURSE PRACTITIONER

## 2020-12-03 PROCEDURE — 6360000002 HC RX W HCPCS: Performed by: INTERNAL MEDICINE

## 2020-12-03 PROCEDURE — 80053 COMPREHEN METABOLIC PANEL: CPT

## 2020-12-03 PROCEDURE — 86481 TB AG RESPONSE T-CELL SUSP: CPT

## 2020-12-03 PROCEDURE — 85025 COMPLETE CBC W/AUTO DIFF WBC: CPT

## 2020-12-03 PROCEDURE — 96413 CHEMO IV INFUSION 1 HR: CPT

## 2020-12-03 PROCEDURE — 84439 ASSAY OF FREE THYROXINE: CPT

## 2020-12-03 RX ORDER — OXYCODONE HYDROCHLORIDE 15 MG/1
15 TABLET ORAL EVERY 4 HOURS PRN
Qty: 90 TABLET | Refills: 0 | Status: SHIPPED | OUTPATIENT
Start: 2020-12-03 | End: 2020-12-18

## 2020-12-03 RX ORDER — SODIUM CHLORIDE 9 MG/ML
20 INJECTION, SOLUTION INTRAVENOUS ONCE
Status: DISCONTINUED | OUTPATIENT
Start: 2020-12-03 | End: 2020-12-04 | Stop reason: HOSPADM

## 2020-12-03 RX ORDER — SODIUM CHLORIDE 0.9 % (FLUSH) 0.9 %
10 SYRINGE (ML) INJECTION PRN
Status: DISCONTINUED | OUTPATIENT
Start: 2020-12-03 | End: 2020-12-04 | Stop reason: HOSPADM

## 2020-12-03 RX ORDER — OXYCODONE HYDROCHLORIDE 15 MG/1
15 TABLET ORAL EVERY 4 HOURS PRN
COMMUNITY
End: 2020-12-03 | Stop reason: SDUPTHER

## 2020-12-03 RX ORDER — HEPARIN SODIUM (PORCINE) LOCK FLUSH IV SOLN 100 UNIT/ML 100 UNIT/ML
500 SOLUTION INTRAVENOUS PRN
Status: DISCONTINUED | OUTPATIENT
Start: 2020-12-03 | End: 2020-12-04 | Stop reason: HOSPADM

## 2020-12-03 RX ORDER — FENTANYL 75 UG/H
1 PATCH TRANSDERMAL
Qty: 5 PATCH | Refills: 0 | Status: SHIPPED | OUTPATIENT
Start: 2020-12-03 | End: 2020-12-18

## 2020-12-03 RX ORDER — SENNOSIDES 8.8 MG/5ML
5 LIQUID ORAL NIGHTLY PRN
COMMUNITY

## 2020-12-03 RX ADMIN — Medication 500 UNITS: at 13:36

## 2020-12-03 RX ADMIN — SODIUM CHLORIDE 20 ML/HR: 900 INJECTION, SOLUTION INTRAVENOUS at 13:01

## 2020-12-03 RX ADMIN — SODIUM CHLORIDE 200 MG: 900 INJECTION, SOLUTION INTRAVENOUS at 12:59

## 2020-12-03 RX ADMIN — SODIUM CHLORIDE, PRESERVATIVE FREE 10 ML: 5 INJECTION INTRAVENOUS at 13:36

## 2020-12-03 NOTE — PROGRESS NOTES
1130: RN Assessment    Pt here for lab/treatment s/p his provider appt today    Pt with left chest wall port accessed with a 20 gauge 1inch Barfield needle, + blood return, dressing to site. Labs drawn from port today, although, the pt says that sometime he does not get blood return from it. Treatment:    [id number]: Keytruda infusion given without issues. + blood return pre/post. Port flushed with NS and Heparin and deaccessed, bandaid to site. Pt given AVS and CBC results today. Pt in no distress upon leaving, ambulated without issues    Patient's status assessed and documented appropriately. All labs and required results were also reviewed today. Treatment parameters have been reviewed. Today's treatment has been approved by the provider. Treatment orders and medication sequencing (when applicable) was verified by 2 registered nurses. The treatment plan was confirmed with the patient prior to administration, and the patient understands the need to report any treatment-related symptoms. Prior to administration, when applicable, the following 8 elements of medication administration were reviewed with 2nd Registered Nurse prior to dosing: drug name, drug dose, infusion volume when prepared in a syringe, rate of administration, expiration dates and/or times, appearance and integrity of drug(s), and rate of pump for infusion. The 5 rights of medication administration have been verified.

## 2020-12-03 NOTE — PROGRESS NOTES
MA Rooming Questions  Patient: Kristofer Barrientos  MRN: [id number]    Date: 12/3/2020        1. Do you have any new issues? yes - something for break through pain, pt states he does have a hard time bathing self and getting dressed. 2. Do you need any refills on medications? yes - pain meds. 3. Have you had any imaging done since your last visit? yes -     4. Have you been hospitalized or seen in the emergency room since your last visit here?   yes -     5. Did the patient have a depression screening completed today?  No    No data recorded     PHQ-9 Given to (if applicable):               PHQ-9 Score (if applicable):                     [] Positive     []  Negative              Does question #9 need addressed (if applicable)                     [] Yes    []  No               Odalys Maddox MA

## 2020-12-09 ENCOUNTER — INITIAL CONSULT (OUTPATIENT)
Dept: ONCOLOGY | Age: 65
End: 2020-12-09
Payer: COMMERCIAL

## 2020-12-09 ENCOUNTER — HOSPITAL ENCOUNTER (OUTPATIENT)
Dept: INFUSION THERAPY | Age: 65
Discharge: HOME | End: 2020-12-09
Payer: COMMERCIAL

## 2020-12-09 VITALS
BODY MASS INDEX: 22.36 KG/M2 | HEART RATE: 125 BPM | SYSTOLIC BLOOD PRESSURE: 107 MMHG | OXYGEN SATURATION: 100 % | TEMPERATURE: 96.9 F | RESPIRATION RATE: 16 BRPM | HEIGHT: 69 IN | WEIGHT: 151 LBS | DIASTOLIC BLOOD PRESSURE: 65 MMHG

## 2020-12-09 PROCEDURE — 99403 PREV MED CNSL INDIV APPRX 45: CPT | Performed by: INTERNAL MEDICINE

## 2020-12-09 PROCEDURE — 99211 OFF/OP EST MAY X REQ PHY/QHP: CPT

## 2020-12-09 RX ORDER — DEXAMETHASONE 4 MG/1
TABLET ORAL
COMMUNITY
Start: 2020-11-22 | End: 2020-12-10 | Stop reason: SDUPTHER

## 2020-12-09 NOTE — PROGRESS NOTES
Palliative Care Initial Assessment    Medical Oncology History:    Very pleasant, 72years old, June 2020 admitted with seizure. CT left parietal lobe mass. CT chest left upper lobe nodule. Further evaluation at new enhancing left parietal right parietal and right occipital lobe mass received stereotactic radiation on November 25. EBUS Elda 15, 2020 adenocarcinoma subcarinal lymph node biopsy. PD-L1 +91% subsequent resection of left parietal lobe mass . Also recently received concomitant chemotherapy and radiation therapy in August 2020 to the local lung lesion finishing on September 29, 2020. September 2020 also received radiation therapy to the left sacrum. October 15, 2020 started on Keytruda. MRI of brain done on November 19, 2020 showed new enhancing lesion in left parietal and right parietal lobe and right occipital lobe. Received stereotactic radiation on November 25. Subsequent MRI of thoracic and lumbar spine multiple osseous metastatic lesions. Other Medical Problems:    Severe COPD. Also history of HIV AIDS, still on therapy, undetectable levels since 2005     Personal History     A. Life Time:    Used to work as a  at [employer]. Retired after 40 years in 2013. Single never been  no children. Used to smoke quit few years ago. B. Family:     Lives alone. Close to his sister and the sister-in-law who are quite helpful. Physical Symptoms:    Rapidly deteriorating course. Severe pain involving back and hip limiting his day-to-day activities. On fentanyl 50 mcg, oxycodone 15 mg for breakthrough every 4 hours. Pain not under control. Also getting weaker with increasing weakness involving both lower legs. Starting to have more problems with constipation as well as lack of appetite. Denied any worsening of pulmonary or cardiac symptoms. No skin issues. Psychological and Cognitive Symptoms:    In this regard doing fair states he is not afraid of dialysis in

## 2020-12-09 NOTE — PROGRESS NOTES
MA Rooming Questions  Patient: Estuardo Hamilton  MRN: [id number]    Date: 12/9/2020        1. Do you have any new issues? yes - back pain at a 10 on scale;          2. Do you need any refills on medications?    no    3. Have you had any imaging done since your last visit?   no    4. Have you been hospitalized or seen in the emergency room since your last visit here?   no    5. Did the patient have a depression screening completed today?  No    No data recorded     PHQ-9 Given to (if applicable):               PHQ-9 Score (if applicable):                     [] Positive     []  Negative              Does question #9 need addressed (if applicable)                     [] Yes    []  No               Karthik Augustine MA

## 2020-12-10 ENCOUNTER — CLINICAL DOCUMENTATION (OUTPATIENT)
Dept: ONCOLOGY | Age: 65
End: 2020-12-10

## 2020-12-10 RX ORDER — DEXAMETHASONE 4 MG/1
4 TABLET ORAL DAILY
Qty: 30 TABLET | Refills: 0 | Status: SHIPPED | OUTPATIENT
Start: 2020-12-10

## 2020-12-10 NOTE — PROGRESS NOTES
Returned call to patient. He reports that he was waiting to hear from [address] for a call to schedule an appointment with him. He wanted to inform Dr Enedina Freed that his pain is not much improved today after taking pain medication and stated that Dr Enedina Freed had discussed ordering dexamethasone. Dr Enedina Freed notified and gives order for dexamethasone 4 mg by mouth daily. Rx sent to pharmacy. Patient given the phone number for [facility] to contact. Informed to call if he had any additional needs. Patient verbalized understanding of order and plan of care.

## 2020-12-15 ENCOUNTER — HOSPITAL ENCOUNTER (OUTPATIENT)
Dept: RADIATION ONCOLOGY | Age: 65
Discharge: HOME | End: 2020-12-15
Attending: RADIOLOGY
Payer: COMMERCIAL

## 2020-12-15 VITALS
SYSTOLIC BLOOD PRESSURE: 102 MMHG | RESPIRATION RATE: 16 BRPM | HEIGHT: 69 IN | BODY MASS INDEX: 22.36 KG/M2 | OXYGEN SATURATION: 98 % | DIASTOLIC BLOOD PRESSURE: 63 MMHG | WEIGHT: 151 LBS | HEART RATE: 109 BPM | TEMPERATURE: 98.5 F

## 2020-12-15 ASSESSMENT — PAIN DESCRIPTION - ORIENTATION: ORIENTATION: LOWER;RIGHT;LEFT

## 2020-12-15 ASSESSMENT — PAIN DESCRIPTION - PROGRESSION: CLINICAL_PROGRESSION: GRADUALLY WORSENING

## 2020-12-15 ASSESSMENT — PAIN DESCRIPTION - PAIN TYPE: TYPE: CHRONIC PAIN;ACUTE PAIN

## 2020-12-15 ASSESSMENT — PAIN DESCRIPTION - FREQUENCY: FREQUENCY: CONTINUOUS

## 2020-12-15 ASSESSMENT — PAIN - FUNCTIONAL ASSESSMENT: PAIN_FUNCTIONAL_ASSESSMENT: PREVENTS OR INTERFERES SOME ACTIVE ACTIVITIES AND ADLS

## 2020-12-15 ASSESSMENT — PAIN DESCRIPTION - ONSET: ONSET: ON-GOING

## 2020-12-15 ASSESSMENT — PAIN DESCRIPTION - LOCATION: LOCATION: BACK;LEG

## 2020-12-15 ASSESSMENT — PAIN SCALES - GENERAL: PAINLEVEL_OUTOF10: 9

## 2020-12-15 NOTE — PROGRESS NOTES
 albuterol-ipratropium (COMBIVENT RESPIMAT)  MCG/ACT AERS inhaler Inhale 1 puff into the lungs every 6 hours 4 g 5    TRIUMEQ 600- MG TABS TAKE ONE TABLET BY MOUTH ONCE DAILY. STORE IN ORIGINAL BOTTLE AT ROOM TEMPERATURE. 30 tablet 4    budesonide (PULMICORT FLEXHALER) 180 MCG/ACT AEPB inhaler Inhale 2 puffs into the lungs 2 times daily. 1 each 3    guaiFENesin (MUCINEX) 600 MG extended release tablet Take 1,200 mg by mouth 2 times daily      levETIRAcetam (KEPPRA) 500 MG tablet TAKE 1 TABLET BY MOUTH EVERY 12 HOURS      theophylline (UNIPHYL) 400 MG extended release tablet Take one half tablet by mouth twice daily 90 tablet 3    zolpidem (AMBIEN) 10 MG tablet Take 10 mg by mouth nightly as needed.  Magic Mouthwash (MIRACLE MOUTHWASH) Swish and spit 5 mLs 4 times daily as needed for Irritation (Patient not taking: Reported on 12/15/2020) 400 mL 2     No current facility-administered medications for this encounter. Additional Comments: Pt with one month post RT follow up. Pt reports pain improved for a short time but now back up to rating 10/10. States signed onto hospice yesterday.      Electronically signed by Benedict Patel RN on 12/15/2020 at 1:16 PM JO ANN

## 2020-12-15 NOTE — PROGRESS NOTES
[address] [address], [address] [facility]  Phone: [phone]  Fax: [phone]    RADIATION ONCOLOGY FOLLOW UP REPORT    PATIENT NAME:  Tang Paul              : 1955  MEDICAL RECORD NO: [id number]    [facility] NO: [id number]        PROVIDER: Adiel Joshua MD      DATE OF SERVICE: 12/15/2020     Other Physicians:  Dr. Bubba Garcia M.D.   [address] [facility], [address] [facility],Third Floor            DIAGNOSIS: Cancer Staging  Primary malignant neoplasm of bronchus of right middle lobe [facility]  Staging form: Lung, AJCC 8th Edition  - Clinical: Stage IV (pM1) - Signed by Adiel Joshua MD on 2020       TREATMENT COURSE:   Oncology History   Secondary malignant neoplasm of brain ([address]   2020 Initial Diagnosis    Secondary malignant neoplasm of brain ([address]     2020 Surgery    Resection left parietal metastasis     7/15/2020 - 2020 Radiation    L Parietal Met: 24Gy (3 x 8Gy) STRS at [facility]     Primary malignant neoplasm of bronchus of right middle lobe ([address]   2020 Initial Diagnosis    Primary malignant neoplasm of bronchus of right middle lobe ([address]     2020 - 2020 Radiation    Rt Chest Lung Mass/LNs: 60Gy IMRT         Secondary malignant neoplasm of bone and bone marrow ([address]   2020 Initial Diagnosis    Secondary malignant neoplasm of bone and bone marrow ([address]       10/26/2020 - 2020 Radiation    T9-L1: 30Gy             HPI: Andrés Morgan is a 72 y.o. male who has a history as above who returns today for first postradiation visit. He reports that his back pain initially improved, however then started 2 progressively worsen. He did enroll in hospice yesterday and is currently on medical pain management with a 75 µg fentanyl patch and Dilaudid for breakthrough pain. He reports the pain begins pointing to the lower thoracic/upper spine region and radiates down through the tip of the sacrum. He is on laxatives which are helping him have regular bowel movements. RADIOLOGIC STUDIES:  MRI Brain [facility] 11/19/20  IMPRESSION:    1.  New enhancing lesions seen in the left frontal, right parietal, and right  occipital lobes, likely representing new metastatic lesions. The largest is in  the left frontal lobe and has moderate surrounding edema. 2.  Postoperative changes from left parietal craniotomy and lesion resection. FLAIR signal abnormality in this area is slightly decreased compared to the  prior exam.        MRI C, T, L Spine [facility] 11/19/20  Result Impression   IMPRESSION: Right T7 vertebral body osseous metastatic lesion with  prevertebral soft tissue extension and thin right ventral epidural tumor  without contacting the ventral cervical spinal cord. Additional small osseous  metastatic lesion involving the left C1 lateral mass. IMPRESSION:     1. Numerous metastatic osseous lesions involving the thoracic spine including  posterior elements and the medial right clavicle. 2. Extraosseous soft tissue extension involving the left T3 transverse process  into the paraspinal soft tissues. Thin ventral epidural tumor on the right at  T3, T11 and anteriorly at the T12 level without associated central canal  stenosis. No abnormal cord signal intensity. IMPRESSION: Multiple osseous metastatic lesions in the lumbar spine and the  visualized pelvic bones.  Extraosseous soft tissue tumor involving the posterior L1 spinous process into the adjacent paraspinal soft tissues and  right L2 vertebral body into the right paravertebral soft tissues. Thin left  ventral epidural tumor at the L2 level without associated central canal  stenosis. 10/26/20 Bone Scan  Impression   Pelvic and spinal activity is above, compatible with known history of osseous   metastatic disease.       Longitudinal uptake along the femurs, suggestive of hypertrophic pulmonary   osteoarthropathy.  A superimposed focal metastatic lesion within the left   femoral diaphysis cannot be excluded. PET/CT:   Results for orders placed during the hospital encounter of 07/09/20   PET CT Skull Base To Mid Thigh    Narrative EXAMINATION:  WHOLE BODY PET/CT    7/9/2020    TECHNIQUE:  Following IV injection of 13.5 mCi of F 18 -FDG, PET  tumor imaging was  acquired from the skull vertex to the mid thighs. Computed tomography was  used for purposes of attenuation correction and anatomic localization. Fusion imaging was utilized for interpretation. Uptake time 90 mins. Glucose level 94 mg/dl. COMPARISON:  CT scan of the chest 06/13/2020    HISTORY:  ORDERING SYSTEM PROVIDED HISTORY: Primary malignant neoplasm of bronchus of  right middle lobe [facility])  TECHNOLOGIST PROVIDED HISTORY:  initial staging lung ca. FINDINGS:  HEAD/NECK: No metabolically active cervical lymphadenopathy. CHEST: Heterogeneous activity associated with the right middle lobe masslike  consolidation with greatest activity towards the hilum, SUV max of 22.4. Separate focal FDG activity in the right hilum with SUV max of 6.1. Metabolically active subcarinal lymph node measuring 2 cm in short axis  dimension, SUV max of 17.1. Increased FDG activity corresponds to irregular patchy opacity in the  peripheral left upper lobe, similar to the recent CT scan, SUV max of 2.2. ABDOMEN/PELVIS: No metabolically active intraperitoneal mass.   No paratracheal lymph nodes consistent with remote healed granulomatous disease. Subcarinal lymph node demonstrates ill-defined borders measuring 0.8 cm in  short axis compared to 1.4 cm on the prior exam.  No significant left hilar  lymphadenopathy. Stable prominence of right hilar lymph nodes. New trace  pericardial effusion. Normal heart size. The thoracic aorta and pulmonary  arteries demonstrate no acute abnormality. Lungs/pleura: New moderate right pleural effusion with mild pleural  thickening. No pleural mass identified. The trachea is patent with some  dependent debris. Right hilar bronchial thickening. Emphysema. Right  middle lobe mass and consolidated lung which measures 5 cm on axial image 109  compared to 7.5 cm on the prior exam.  The right lung demonstrates  progressive patchy ground-glass densities and interlobular septal thickening. In the left upper lobe posterolateral aspect there is stable irregular  density and architectural distortion. Upper Abdomen: No intrahepatic mass. Right renal cyst.  No acute abnormality. Soft Tissues/Bones: Left-sided subcutaneous medical infusion port with  catheter tip in the distal left brachiocephalic vein. Adjacent to the distal  portion and tip there is some amorphous hypodense filling defect consistent  with partial fibrin sheath. Mild symmetric subareolar soft tissue density  consistent with gynecomastia. No significant enlarged supraclavicular or  axillary lymphadenopathy. Normal thoracic spine alignment. No acute osseous abnormality. In the upper  thoracic spine, the lower thoracic spine, and the upper to mid lumbar spine  there are multifocal vertebral body and L1 spinous process lytic masses with  a spinous process mass extending into the adjacent soft tissues measuring  approximately 3.2 cm. Impression 1.  Compared to the most recent exam PET/CT 07/09/2020, there is interval  ondansetron (ZOFRAN) 8 MG tablet TAKE 1 TABLET BY MOUTH EVERY 8 HOURS AS NEEDED FOR NAUSEA FOR VOMITING 30 tablet 0    albuterol-ipratropium (COMBIVENT RESPIMAT)  MCG/ACT AERS inhaler Inhale 1 puff into the lungs every 6 hours 4 g 5    Magic Mouthwash (MIRACLE MOUTHWASH) Swish and spit 5 mLs 4 times daily as needed for Irritation 400 mL 2    TRIUMEQ 600- MG TABS TAKE ONE TABLET BY MOUTH ONCE DAILY. STORE IN ORIGINAL BOTTLE AT ROOM TEMPERATURE. 30 tablet 4    budesonide (PULMICORT FLEXHALER) 180 MCG/ACT AEPB inhaler Inhale 2 puffs into the lungs 2 times daily. 1 each 3    guaiFENesin (MUCINEX) 600 MG extended release tablet Take 1,200 mg by mouth 2 times daily      levETIRAcetam (KEPPRA) 500 MG tablet TAKE 1 TABLET BY MOUTH EVERY 12 HOURS      theophylline (UNIPHYL) 400 MG extended release tablet Take one half tablet by mouth twice daily 90 tablet 3    zolpidem (AMBIEN) 10 MG tablet Take 10 mg by mouth nightly as needed. No current facility-administered medications for this encounter. EXAMINATION:   There were no vitals filed for this visit. The patient is in no acute distress. Neck: Supple no lymphadenopathy is present. Abdomen: Soft, nontender and nondistended. No hepatosplenomegaly or masses are appreciated. Extremities: No cyanosis, clubbing, or edema is present. ASSESSMENT AND PLAN:     Casimiro Matamoros is a 72 y.o. male who has a history of metastatic lung cancer who is now approximately 1 month after completion of his recent spine RT who has symptomatic disease progression who is currently enrolled in hospice and medical pain management with palliative symptom control. The treatment options were discussed with the patient. I do agree and him proceeding with medical pain management through hospice. He is to return to see me on an as needed basis. The patient is to continue to follow CDC's Covid 19 precautionary measures. Electronically signed by Allen Cabello MD on 12/15/2020 at 1:12 PM

## 2020-12-16 ENCOUNTER — HOSPITAL ENCOUNTER (OUTPATIENT)
Dept: INFUSION THERAPY | Age: 65
Discharge: HOME | End: 2020-12-16
Payer: COMMERCIAL

## 2020-12-16 ENCOUNTER — OFFICE VISIT (OUTPATIENT)
Dept: ONCOLOGY | Age: 65
End: 2020-12-16
Payer: COMMERCIAL

## 2020-12-16 VITALS
HEIGHT: 69 IN | HEART RATE: 118 BPM | WEIGHT: 151 LBS | OXYGEN SATURATION: 96 % | BODY MASS INDEX: 22.36 KG/M2 | DIASTOLIC BLOOD PRESSURE: 50 MMHG | TEMPERATURE: 97.4 F | SYSTOLIC BLOOD PRESSURE: 94 MMHG

## 2020-12-16 PROCEDURE — 99211 OFF/OP EST MAY X REQ PHY/QHP: CPT

## 2020-12-16 PROCEDURE — 99401 PREV MED CNSL INDIV APPRX 15: CPT | Performed by: INTERNAL MEDICINE

## 2020-12-16 NOTE — PROGRESS NOTES
MA Rooming Questions  Patient: Madelin Gutierrez  MRN: [id number]    Date: 12/16/2020        1. Do you have any new issues? yes - still trying to get pain under control, was having some constipation         2. Do you need any refills on medications?    no    3. Have you had any imaging done since your last visit?   no    4. Have you been hospitalized or seen in the emergency room since your last visit here?   no    5. Did the patient have a depression screening completed today?  No    No data recorded     PHQ-9 Given to (if applicable):               PHQ-9 Score (if applicable):                     [] Positive     []  Negative              Does question #9 need addressed (if applicable)                     [] Yes    []  No               Sherryle Merl, MA

## 2020-12-16 NOTE — PROGRESS NOTES
Palliative Care Initial Assessment    Medical Oncology History:    Very pleasant, 72years old, June 2020 admitted with seizure. CT left parietal lobe mass. CT chest left upper lobe nodule. Further evaluation at new enhancing left parietal right parietal and right occipital lobe mass received stereotactic radiation on November 25. EBUS [place] 15, 2020 adenocarcinoma subcarinal lymph node biopsy. PD-L1 +91% subsequent resection of left parietal lobe mass . Also recently received concomitant chemotherapy and radiation therapy in August 2020 to the local lung lesion finishing on September 29, 2020. September 2020 also received radiation therapy to the left sacrum. October 15, 2020 started on Keytruda. MRI of brain done on November 19, 2020 showed new enhancing lesion in left parietal and right parietal lobe and right occipital lobe. Received stereotactic radiation on November 25. Subsequent MRI of thoracic and lumbar spine multiple osseous metastatic lesions. On his visit on December 9, 2020 we had talked about significant progression of the disease and extremely poor performance status, possibly withholding further treatments and proceeding with hospice  Other Medical Problems:    Severe COPD. Also history of HIV AIDS, still on therapy, undetectable levels since 2005     Personal History     A. Life Time:    Used to work as a  at [employer]. Retired after 40 years in 2013. Single never been  no children. Used to smoke quit few years ago. B. Family:     Lives alone. Close to his sister and the sister-in-law who are quite helpful. Physical Symptoms:     December was 16, 2020 definite improvement since his last visit a week ago. On fentanyl 75 mcg and oxycodone 15 along with dexamethasone and those measures are helpful. With more usage of stool softeners and MiraLAX bowel functions have also improved. Hospice is involved and he has benefited from their involvement also. All in all I think he there was improvement since his last   visit psychological and Cognitive Symptoms: In this regard doing fair states he is not afraid of dialysis in that regards is at peace has not noticed any significant decline in his cognitive abilities  Illness Understanding and Care Preferences:   Has a very good understanding about the disease process. He of course has been very pleased with his care that he has been receiving. Would like to continue with the treatment at least for now  Existential and Spiritual:   Not charged person but quite spiritual.  Believes in God. As mentioned above important factor being at peace  Social and Economic Resources:   Major issue being lives alone and with him not been able to do his sedated activities, I am worried about him being alone especially with narcotics  Care Coordination:   Long discussion with him on December 9, 2020. We did talk about with significant progression of the disease especially with pain metastases is overall prognosis is quite poor. We talked about a few issues in particular #1 pain control advised him to increase fentanyl to 75 mcg and oxycodone to every 3-4 hours and for him to let me know tomorrow and if there is not much help would start Roxanol in place of oxycodone to better titrate. #2 constipation advised him to take more senna and MiraLAX on a regular basis #3 overall prognosis being poor and specially him living alone encouraged him to consider hospice. He was comfortable and will make arrangements for hospice to see him. December 16, 2020 as described above definite, better control of his symptoms though still not completely there as we would like to. Encouraged him to stay with hospice the fentanyl dose may be increased 100 along with oxycodone. Continue with the same supportive bowel regimen.   I will be seeing him again in 3 to 4 weeks or sooner    Time spent 15 minutes

## 2020-12-24 ENCOUNTER — HOSPITAL ENCOUNTER (OUTPATIENT)
Dept: INFUSION THERAPY | Age: 65
Discharge: HOME | End: 2020-12-24
Payer: COMMERCIAL

## 2020-12-24 ENCOUNTER — TELEPHONE (OUTPATIENT)
Dept: ONCOLOGY | Age: 65
End: 2020-12-24

## 2020-12-24 ENCOUNTER — OFFICE VISIT (OUTPATIENT)
Dept: ONCOLOGY | Age: 65
End: 2020-12-24
Payer: COMMERCIAL

## 2020-12-24 VITALS
BODY MASS INDEX: 22.96 KG/M2 | SYSTOLIC BLOOD PRESSURE: 123 MMHG | HEART RATE: 114 BPM | DIASTOLIC BLOOD PRESSURE: 70 MMHG | OXYGEN SATURATION: 98 % | WEIGHT: 155 LBS | TEMPERATURE: 97.8 F | HEIGHT: 69 IN

## 2020-12-24 VITALS
SYSTOLIC BLOOD PRESSURE: 123 MMHG | HEART RATE: 114 BPM | TEMPERATURE: 97.8 F | DIASTOLIC BLOOD PRESSURE: 70 MMHG | HEIGHT: 69 IN | WEIGHT: 155 LBS | OXYGEN SATURATION: 98 % | BODY MASS INDEX: 22.96 KG/M2

## 2020-12-24 DIAGNOSIS — Z21 ASYMPTOMATIC HIV INFECTION (HCC): ICD-10-CM

## 2020-12-24 DIAGNOSIS — C34.2 PRIMARY MALIGNANT NEOPLASM OF BRONCHUS OF RIGHT MIDDLE LOBE (HCC): Primary | ICD-10-CM

## 2020-12-24 DIAGNOSIS — R06.09 EXERTIONAL DYSPNEA: ICD-10-CM

## 2020-12-24 LAB
ALBUMIN SERPL-MCNC: 3.5 GM/DL (ref 3.4–5)
ALP BLD-CCNC: 145 IU/L (ref 40–129)
ALT SERPL-CCNC: 11 U/L (ref 10–40)
ANION GAP SERPL CALCULATED.3IONS-SCNC: 9 MMOL/L (ref 4–16)
AST SERPL-CCNC: 17 IU/L (ref 15–37)
BANDED NEUTROPHILS ABSOLUTE COUNT: 0.19 K/CU MM
BANDED NEUTROPHILS RELATIVE PERCENT: 1 % (ref 5–11)
BILIRUB SERPL-MCNC: 0.4 MG/DL (ref 0–1)
BUN BLDV-MCNC: 17 MG/DL (ref 6–23)
CALCIUM SERPL-MCNC: 8.8 MG/DL (ref 8.3–10.6)
CHLORIDE BLD-SCNC: 100 MMOL/L (ref 99–110)
CO2: 28 MMOL/L (ref 21–32)
CREAT SERPL-MCNC: 1 MG/DL (ref 0.9–1.3)
DIFFERENTIAL TYPE: ABNORMAL
GFR AFRICAN AMERICAN: >60 ML/MIN/1.73M2
GFR NON-AFRICAN AMERICAN: >60 ML/MIN/1.73M2
GLUCOSE BLD-MCNC: 97 MG/DL (ref 70–99)
HCT VFR BLD CALC: 40.2 % (ref 42–52)
HEMOGLOBIN: 13 GM/DL (ref 13.5–18)
HYPOCHROMIA: ABNORMAL
LYMPHOCYTES ABSOLUTE: 0.2 K/CU MM
LYMPHOCYTES RELATIVE PERCENT: 1 % (ref 24–44)
MCH RBC QN AUTO: 30 PG (ref 27–31)
MCHC RBC AUTO-ENTMCNC: 32.3 % (ref 32–36)
MCV RBC AUTO: 92.8 FL (ref 78–100)
MONOCYTES ABSOLUTE: 1.5 K/CU MM
MONOCYTES RELATIVE PERCENT: 8 % (ref 0–4)
MYELOCYTE PERCENT: 1 %
MYELOCYTES ABSOLUTE COUNT: 0.19 K/CU MM
PDW BLD-RTO: 14.5 % (ref 11.7–14.9)
PLATELET # BLD: 154 K/CU MM (ref 140–440)
PMV BLD AUTO: 9.9 FL (ref 7.5–11.1)
POTASSIUM SERPL-SCNC: 4.1 MMOL/L (ref 3.5–5.1)
RBC # BLD: 4.33 M/CU MM (ref 4.6–6.2)
SEGMENTED NEUTROPHILS ABSOLUTE COUNT: 16.6 K/CU MM
SEGMENTED NEUTROPHILS RELATIVE PERCENT: 89 % (ref 36–66)
SODIUM BLD-SCNC: 137 MMOL/L (ref 135–145)
T4 FREE: 1.22 NG/DL (ref 0.9–1.8)
TOTAL PROTEIN: 5.9 GM/DL (ref 6.4–8.2)
TSH HIGH SENSITIVITY: 1.34 UIU/ML (ref 0.27–4.2)
WBC # BLD: 18.7 K/CU MM (ref 4–10.5)

## 2020-12-24 PROCEDURE — 6360000002 HC RX W HCPCS: Performed by: INTERNAL MEDICINE

## 2020-12-24 PROCEDURE — 84439 ASSAY OF FREE THYROXINE: CPT

## 2020-12-24 PROCEDURE — 85007 BL SMEAR W/DIFF WBC COUNT: CPT

## 2020-12-24 PROCEDURE — 80053 COMPREHEN METABOLIC PANEL: CPT

## 2020-12-24 PROCEDURE — 99214 OFFICE O/P EST MOD 30 MIN: CPT | Performed by: INTERNAL MEDICINE

## 2020-12-24 PROCEDURE — 84443 ASSAY THYROID STIM HORMONE: CPT

## 2020-12-24 PROCEDURE — 36415 COLL VENOUS BLD VENIPUNCTURE: CPT

## 2020-12-24 PROCEDURE — 96413 CHEMO IV INFUSION 1 HR: CPT

## 2020-12-24 PROCEDURE — 2580000003 HC RX 258: Performed by: INTERNAL MEDICINE

## 2020-12-24 PROCEDURE — 85027 COMPLETE CBC AUTOMATED: CPT

## 2020-12-24 RX ORDER — ALBUTEROL SULFATE 2.5 MG/3ML
2.5 SOLUTION RESPIRATORY (INHALATION) EVERY 6 HOURS PRN
COMMUNITY

## 2020-12-24 RX ORDER — SODIUM CHLORIDE 9 MG/ML
20 INJECTION, SOLUTION INTRAVENOUS ONCE
Status: DISCONTINUED | OUTPATIENT
Start: 2020-12-24 | End: 2020-12-25 | Stop reason: HOSPADM

## 2020-12-24 RX ORDER — SODIUM CHLORIDE 0.9 % (FLUSH) 0.9 %
10 SYRINGE (ML) INJECTION PRN
Status: DISCONTINUED | OUTPATIENT
Start: 2020-12-24 | End: 2020-12-25 | Stop reason: HOSPADM

## 2020-12-24 RX ORDER — HEPARIN SODIUM (PORCINE) LOCK FLUSH IV SOLN 100 UNIT/ML 100 UNIT/ML
500 SOLUTION INTRAVENOUS PRN
Status: DISCONTINUED | OUTPATIENT
Start: 2020-12-24 | End: 2020-12-25 | Stop reason: HOSPADM

## 2020-12-24 RX ORDER — FAMOTIDINE 20 MG/1
20 TABLET, FILM COATED ORAL NIGHTLY PRN
COMMUNITY

## 2020-12-24 RX ADMIN — Medication 500 UNITS: at 11:18

## 2020-12-24 RX ADMIN — SODIUM CHLORIDE, PRESERVATIVE FREE 10 ML: 5 INJECTION INTRAVENOUS at 11:18

## 2020-12-24 RX ADMIN — SODIUM CHLORIDE 20 ML/HR: 9 INJECTION, SOLUTION INTRAVENOUS at 10:10

## 2020-12-24 RX ADMIN — SODIUM CHLORIDE 200 MG: 9 INJECTION, SOLUTION INTRAVENOUS at 10:33

## 2020-12-24 ASSESSMENT — PATIENT HEALTH QUESTIONNAIRE - PHQ9
SUM OF ALL RESPONSES TO PHQ QUESTIONS 1-9: 0
SUM OF ALL RESPONSES TO PHQ QUESTIONS 1-9: 0
1. LITTLE INTEREST OR PLEASURE IN DOING THINGS: 0
SUM OF ALL RESPONSES TO PHQ9 QUESTIONS 1 & 2: 0
SUM OF ALL RESPONSES TO PHQ QUESTIONS 1-9: 0
2. FEELING DOWN, DEPRESSED OR HOPELESS: 0

## 2020-12-24 NOTE — PROGRESS NOTES
0930: Pt here for treatment, A&OX3. No new issues to report. Platelets today <[phone]  Dr. Kvng Armenta notified no new orders, per MD, will need to continue to monitor to make sure counts dont continue to drop. Pt reports hx Hepatitis over 16 years ago which has corrected itself. Pt ambulates with a rollator in office today for generalized weakness. Access:    Left chest wall port, accessed with a 20 gauge 1 inch Barfield needle, + blood return , dressing to site    Treatment    1010: NS hung at [facility] rate    1033: Keytruda infusion started, set to infuse over 30 minutes. + blood return pre infusion. 1118: Tx completed, pt with no distress    Port flushed with NS and Heparin and deaccessed, bandaid to site. Pt given a copy of his AVS report and lab results. Pt in no distress upon leaving. He reports his nephew will be picking him up today    Patient's status assessed and documented appropriately. All labs and required results were also reviewed today. Treatment parameters have been reviewed. Today's treatment has been approved by the provider. Treatment orders and medication sequencing (when applicable) was verified by 2 registered nurses. The treatment plan was confirmed with the patient prior to administration, and the patient understands the need to report any treatment-related symptoms. Prior to administration, when applicable, the following 8 elements of medication administration were reviewed with 2nd Registered Nurse prior to dosing: drug name, drug dose, infusion volume when prepared in a syringe, rate of administration, expiration dates and/or times, appearance and integrity of drug(s), and rate of pump for infusion. The 5 rights of medication administration have been verified.

## 2020-12-24 NOTE — TELEPHONE ENCOUNTER
Iva Baxter with [facility] lab called with result of platelets of 162 she will issue a revised report with the ree count.

## 2020-12-24 NOTE — PROGRESS NOTES
Patient Name: Modesto West  Patient : 1955  Patient MRN: [id number]     Primary Oncologist: Fab Reynolds MD  Referring Provider: Malinda Aguirre MD     Date of Service: 2020      Chief Complaint:   Chief Complaint   Patient presents with    Follow-up     Patient Active Problem List:     Secondary malignant neoplasm of brain      Primary malignant neoplasm of bronchus of right middle lobe      Adenocarcinoma of lung, stage 4, right      HPI:   Nain Nieto is a 60-year-old very pleasant gentleman with medical history significant for HIV/AIDS, COPD and GERD referred to me on 2020 for evaluation of his metastatic lung cancer. He initially presented to [facility] with seizure on 2020. CT scan of the head showed vasogenic edema in left parietal lobe concerning for underlying mass. CT scan of the chest showed postobstructive collapse/consolidation of the right middle lobe, endobronchial lesion cannot be excluded and 16 mm left upper lobe nodule. He was then transferred to [facility] ED for further workup and management. MRI of the brain with and without contrast done on 20 showed 1.2 cm mass in left parietal lobe with surrounding vasogenic edema, regional mass effect and effacement of atrium of left lateral ventricle. He underwent bronchoscopy, EBUS on 6/15/20 and it showed collapse of RML lateral segment bronchus. Final pathology from EBUS guided biopsy of subcarinal LN was consistent with  lung adenocarcinoma. PD-L1 was positive (95%). He then underwent resection of the left parietal lobe mass on 20 and final pathology was consistent with adenocarcinoma of lung primary. Molecular testing revealed that MET amplification was detected. MRI of the brain done on 20 showed postsurgical changes related to mass resection in the left parietal lobe.  There is small rim of contrast enhancement along the resection cavity margin which may be treatment related, and attention on follow-up imaging is recommended. Surrounding vasogenic edema and mass effect have improved. New tiny focus of restricted diffusion in the left occipital lobe which could be a recent infarct (acute to subacute). No hemorrhage or mass effect associated with this. He was subsequently referred to me for further evaluation. He was seen by radiation oncologist at [facility] and he received SBRT to his brain surgical site between 7/15 and 7/17/2020. PET/CT scan done on July 9, 2020 showed Intense metabolic activity associated with the masslike opacification of the right middle lobe which may represent primary lung cancer and/or postobstructive pneumonia. Metabolically active right hilar and subcarinal lymph node metastasis. Solitary metabolically active skeletal metastasis in the left aspect of the sacrum. Definitive concurrent chemoradiation therapy was started since 8/18/2020 (started RT on 8/18/20 and chemo on 8/19/20) and he completed his last RT on 9/29/20. First line chemotherapy with Belita Closs was started on 10/15/2020. On December 24, 2020, he presented to me for follow-up. I have been following him for metastatic non small cell lung cancer. He is s/p surgery followed by SBRT to his solitary brain metastasis. He is also status post definitive concurrent chemoradiation therapy to his lung lesions. He has been on first line chemotherapy with pembrolizumab since 10/15/20. MRI of brain done on November 19, 2020 showed new enhancing lesion in left parietal and right parietal lobe and right occipital lobe. Received stereotactic radiation on November 25. Subsequent MRI of thoracic and lumbar spine multiple osseous metastatic lesions. He couldn't tolerate SBRT very well this time and he decided to stop it. He has been in hospice care since then. He also decided to continue with [employer] [facility] [place] Malignancy induced pain - continue current pain management as per hospice. Thrombocytopenia - most likely [place] induced or due to recent SBRT. Will have manual diff today. HIV/AIDS - continue with Triumeq. To follow up with ID. Chemo induced nausea - continue with zofran prn. Otherwise, I recommended to continue with Shanice Rojas and I will continue to follow him closely during immunotherapy. Besides the pain, he does not have any other significant symptoms at today's visit. Past Medical History  Significant for  1. HIV/AIDS since 2005  2. COPD  3. GERD  4. 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 Right inguinal hernia repair   2. Tonsillectomy in 1960s  3. Left wrist fracture surgery in 2/28/2020  4. Brain metastasis removal on June 2020    Allergies  No known medication allergies    Social History  He is a former smoker and he quit smoking in 2005. He used to smoke 1 pack per day for about 32 years. He is currently living in [facility]. Family History  Significant for breast cancer in his mother, melanoma in his maternal uncle, lung cancer in his maternal grandmother and leukemia in his maternal grandfather. Review of Systems: \"Per interval history; otherwise 10 point ROS is negative. \"  His energy level is okay, appetite, and sleep are fair. He denies fever, chills, night sweats, cough, shortness of breath, chest pain, hemoptysis, or palpitations. His bowel and bladder functions are normal. He doesn't have nausea, vomiting, abdominal pain, diarrhea, constipation, dysuria, loss of appetite, or weight loss. He doesn't have neuropathy and he denies bleeding or clotting issues. He denies any pain in his body. No anxiety or depression. The rest of the systems are unremarkable.     Vital Signs:  /70 (Site: Right Upper Arm, Position: Sitting, Cuff Size: Medium Adult)   Pulse 114   Temp 97.8 °F (36.6 °C) (Infrared)   Ht 5' 9\" (1.753 m)   Wt 155 lb (70.3 kg)   SpO2 98%   BMI 22.89 kg/m²     Physical Exam:  CONSTITUTIONAL: alert, cooperative, awake, no apparent distress   EYES: pupils equal, round and reactive to light, sclera clear and conjunctiva normal  ENT:  without obvious abnormality, normocephalic, atraumatic  NECK: supple, symmetrical, no jugular venous distension and no carotid bruits   HEMATOLOGIC/LYMPHATIC: no cervical, supraclavicular or axillary lymphadenopathy   LUNGS: no rhonchi, no crackles, VBS, no increased work of breathing and clear to auscultation, no wheezes,    CARDIOVASCULAR: normal S1 and S2, regular rate and rhythm, no murmur noted  ABDOMEN: non-tender, soft, non-distended, normal bowel sounds x 4, no masses palpated, no hepatosplenomegaly   MUSCULOSKELETAL: full range of motion noted, tone is normal  NEUROLOGIC: awake, alert, oriented to name, place and time. Motor skills grossly intact. SKIN: Normal skin color, texture, turgor and no jaundice.  appears intact   EXTREMITIES: no cyanosis, no leg swelling, no clubbing, no LE edema,      Labs:  Hematology:  Lab Results   Component Value Date    WBC 11.4 (H) 12/03/2020    RBC 3.96 (L) 12/03/2020    HGB 11.9 (L) 12/03/2020    HCT 37.3 (L) 12/03/2020    MCV 94.2 12/03/2020    MCH 30.1 12/03/2020    MCHC 31.9 (L) 12/03/2020    RDW 17.0 (H) 12/03/2020     12/03/2020    MPV 9.2 12/03/2020    SEGSPCT 80.6 (H) 12/03/2020    EOSRELPCT 0.4 12/03/2020    BASOPCT 0.2 12/03/2020    LYMPHOPCT 5.3 (L) 12/03/2020    MONOPCT 13.5 (H) 12/03/2020    SEGSABS 9.2 12/03/2020    EOSABS 0.0 12/03/2020    BASOSABS 0.0 12/03/2020    LYMPHSABS 0.6 12/03/2020    MONOSABS 1.5 12/03/2020    DIFFTYPE AUTOMATED DIFFERENTIAL 12/03/2020     No results found for: ESR  Chemistry:  Lab Results   Component Value Date     12/03/2020    K 3.8 12/03/2020     12/03/2020    CO2 26 12/03/2020    BUN 17 12/03/2020    CREATININE 0.8 (L) 12/03/2020    GLUCOSE 74 12/03/2020    CALCIUM 8.5 12/03/2020    PROT 5.7 (L) 12/03/2020    LABALBU 3.3 (L) 12/03/2020    BILITOT 0.4 12/03/2020 ALKPHOS 139 (H) 12/03/2020    AST 22 12/03/2020    ALT 22 12/03/2020    LABGLOM >60 12/03/2020    GFRAA >60 12/03/2020    AGRATIO 1.8 10/22/2020    GLOB 2.0 10/22/2020    PHOS 3.7 08/31/2016    MG 2.5 (H) 06/13/2020    POCCA 1.10 (L) 10/13/2020    POCGLU 115 (H) 10/13/2020     No results found for: MMA, LDH, HOMOCYSTEINE  No components found for: LD  Lab Results   Component Value Date    TSHHS 3.680 11/05/2020    T4FREE 1.60 12/03/2020     Immunology:  Lab Results   Component Value Date    PROT 5.7 (L) 12/03/2020     No results found for: KADIE LouisLCR  No results found for: B2M  Coagulation Panel:  Lab Results   Component Value Date    PROTIME 11.0 04/23/2013    INR 1.01 04/23/2013    APTT 29.1 06/05/2012    DDIMER 297 (H) 09/20/2015     Anemia Panel:  Lab Results   Component Value Date    HCCCWNLC91 388.0 08/29/2016    FOLATE 8.8 08/29/2016     Tumor Markers:  Lab Results   Component Value Date    CEA 3.4 08/30/2016     17.7 08/30/2016    PSA 2.03 03/13/2013     Observations:  PHQ-9 Total Score: 0 (12/24/2020  9:01 AM)     Assessment & Plan:   Metastatic non-small cell lung cancer - adenocarcinoma  Brain metastasis - s/p excision    PLAN  Nerissa De Los Santos is a 28-year-old very pleasant gentleman who was found to have right middle lobe lung mass with left parietal lobe brain metastasis when he presented with seizure on June 13, 2020. Further work ups with bronchoscopy, EBUS on 6/15/20 showed collapse of RML lateral segment bronchus. Final pathology from EBUS guided biopsy of subcarinal lymph node was consistent with  lung adenocarcinoma. PD-L1 was positive (95%). He was subsequently referred to me for further evaluation. He was seen by radiation oncologist at [facility] and he received SBRT to his brain surgical site between 7/15 and 7/17/2020.      PET/CT scan done on July 9, 2020 showed Intense metabolic activity associated with the masslike opacification of the right middle lobe which may

## 2020-12-24 NOTE — PROGRESS NOTES
MA Rooming Questions  Patient: Delio Rader  MRN: [id number]    Date: 12/24/2020        1. Do you have any new issues?   no         2. Do you need any refills on medications?    no    3. Have you had any imaging done since your last visit?   no    4. Have you been hospitalized or seen in the emergency room since your last visit here?   no    5. Did the patient have a depression screening completed today?  Yes    PHQ-9 Total Score: 0 (12/24/2020  9:01 AM)       PHQ-9 Given to (if applicable):               PHQ-9 Score (if applicable):                     [] Positive     []  Negative              Does question #9 need addressed (if applicable)                     [] Yes    []  No               Lelon Spatz, MA

## 2024-04-17 NOTE — PROGRESS NOTES
Patient Name: Dedra Barragan  Patient : 1955  Patient MRN: [id number]     Primary Oncologist: Sara Dexter MD  Referring Provider: Huma Caldera MD     Date of Service: 2020      Chief Complaint: No chief complaint on file. Patient Active Problem List:     Secondary malignant neoplasm of brain      Primary malignant neoplasm of bronchus of right middle lobe      Adenocarcinoma of lung, stage 4, right      HPI:   Adam Tobin is a 30-year-old very pleasant gentleman with medical history significant for HIV/AIDS, COPD and GERD referred to me on 2020 for evaluation of his metastatic lung cancer. He initially presented to [facility] with seizure on 2020. CT scan of the head showed vasogenic edema in left parietal lobe concerning for underlying mass. CT scan of the chest showed postobstructive collapse/consolidation of the right middle lobe, endobronchial lesion cannot be excluded and 16 mm left upper lobe nodule. He was then transferred to [facility] ED for further workup and management. MRI of the brain with and without contrast done on 20 showed 1.2 cm mass in left parietal lobe with surrounding vasogenic edema, regional mass effect and effacement of atrium of left lateral ventricle. He underwent bronchoscopy, EBUS on 6/15/20 and it showed collapse of RML lateral segment bronchus. Final pathology from EBUS guided biopsy of subcarinal LN was consistent with  lung adenocarcinoma. PD-L1 was positive (95%). He then underwent resection of the left parietal lobe mass on 20 and final pathology was consistent with adenocarcinoma of lung primary. Molecular testing revealed that MET amplification was detected. MRI of the brain done on 20 showed postsurgical changes related to mass resection in the left parietal lobe.  There is small rim of contrast enhancement along the resection cavity margin which may be treatment related, and attention on follow-up Patient seen in clinic by provider today. Refills sent to pharmacy on file. DMMP created and sent via portal and given to mom in clinic.    1.  Right inguinal hernia repair   2. Tonsillectomy in 1960s  3. Left wrist fracture surgery in 2/28/2020  4. Brain metastasis removal on June 2020    Allergies  No known medication allergies    Social History  He is a former smoker and he quit smoking in 2005. He used to smoke 1 pack per day for about 32 years. He is currently living in [facility]. Family History  Significant for breast cancer in his mother, melanoma in his maternal uncle, lung cancer in his maternal grandmother and leukemia in his maternal grandfather. Review of Systems: \"Per interval history; otherwise 10 point ROS is negative. \"  His energy level is fair, appetite, and sleep are stable. Denies fever, chills, night sweats, cough, shortness of breath, chest pain, hemoptysis, or palpitations. His bowel and bladder functions are normal. No nausea, vomiting, abdominal pain, diarrhea, constipation, dysuria, loss of appetite, or weight loss. He denies neuropathy and he does not have bleeding or clotting issues. He doesn't have any pain in his body. Denies anxiety or depression. The rest of the systems are unremarkable. Vital Signs: There were no vitals taken for this visit. Physical Exam:  CONSTITUTIONAL: awake, alert, cooperative, no apparent distress   EYES: pupils equal, round and reactive to light, sclera clear and conjunctiva normal  ENT: Normocephalic, without obvious abnormality, atraumatic  NECK: supple, symmetrical, no jugular venous distension and no carotid bruits   HEMATOLOGIC/LYMPHATIC: no cervical, supraclavicular or axillary lymphadenopathy   LUNGS: no increased work of breathing and clear to auscultation   CARDIOVASCULAR: regular rate and rhythm, normal S1 and S2, no murmur noted  ABDOMEN: normal bowel sounds x 4, soft, non-distended, non-tender, no masses palpated, no hepatosplenomegaly   MUSCULOSKELETAL: full range of motion noted, tone is normal  NEUROLOGIC: awake, alert, oriented to name, place and time.  Motor skills grossly intact. SKIN: Normal skin color, texture, turgor and no jaundice.  appears intact   EXTREMITIES: no LE edema     Labs:  Hematology:  Lab Results   Component Value Date    WBC 8.7 08/18/2020    RBC 4.65 08/18/2020    HGB 13.2 (L) 08/18/2020    HCT 40.1 (L) 08/18/2020    MCV 86.2 08/18/2020    MCH 28.4 08/18/2020    MCHC 32.9 08/18/2020    RDW 13.6 08/18/2020     08/18/2020    MPV 8.6 08/18/2020    SEGSPCT 72.6 (H) 08/18/2020    EOSRELPCT 0.9 08/18/2020    BASOPCT 0.2 08/18/2020    LYMPHOPCT 13.3 (L) 08/18/2020    MONOPCT 13.0 (H) 08/18/2020    SEGSABS 6.3 08/18/2020    EOSABS 0.1 08/18/2020    BASOSABS 0.0 08/18/2020    LYMPHSABS 1.2 08/18/2020    MONOSABS 1.1 08/18/2020    DIFFTYPE AUTOMATED DIFFERENTIAL 08/18/2020     No results found for: ESR  Chemistry:  Lab Results   Component Value Date     08/18/2020    K 3.8 08/18/2020    CL 98 (L) 08/18/2020    CO2 27 08/18/2020    BUN 8 08/18/2020    CREATININE 1.1 08/18/2020    GLUCOSE 108 (H) 08/18/2020    CALCIUM 9.2 08/18/2020    PROT 6.7 08/18/2020    LABALBU 3.6 08/18/2020    BILITOT 0.3 08/18/2020    ALKPHOS 154 (H) 08/18/2020    AST 13 (L) 08/18/2020    ALT <5 (L) 08/18/2020    LABGLOM >60 08/18/2020    GFRAA >60 08/18/2020    AGRATIO 0 06/22/2020    GLOB 0 06/22/2020    PHOS 3.7 08/31/2016    MG 2.5 (H) 06/13/2020     No results found for: MMA, LDH, HOMOCYSTEINE  No components found for: LD  Lab Results   Component Value Date    TSHHS 1.720 05/09/2017    T4FREE 1.47 08/29/2016     Immunology:  Lab Results   Component Value Date    PROT 6.7 08/18/2020     No results found for: KADIE PereaLCR  No results found for: B2M  Coagulation Panel:  Lab Results   Component Value Date    PROTIME 11.0 04/23/2013    INR 1.01 04/23/2013    APTT 29.1 06/05/2012    DDIMER 297 (H) 09/20/2015     Anemia Panel:  Lab Results   Component Value Date    [id number] 388.0 08/29/2016    FOLATE 8.8 08/29/2016     Tumor Markers:  Lab Results Component Value Date    CEA 3.4 08/30/2016     17.7 08/30/2016    PSA 2.03 03/13/2013     Observations:  No data recorded      Assessment & Plan:   Metastatic non-small cell lung cancer - adenocarcinoma  Brain metastasis - s/p excision    PLAN  Clarkshay Reid is a 57-year-old very pleasant gentleman who was found to have right middle lobe lung mass with left parietal lobe brain metastasis when he presented with seizure on June 13, 2020. Further work ups with bronchoscopy, EBUS on 6/15/20 showed collapse of RML lateral segment bronchus. Final pathology from EBUS guided biopsy of subcarinal lymph node was consistent with  lung adenocarcinoma. PD-L1 was positive (95%). He was subsequently referred to me for further evaluation. He was seen by radiation oncologist at [address] [place] [address] and he received SBRT to his brain surgical site between 7/15 and 7/17/2020. PET/CT scan done on July 9, 2020 showed Intense metabolic activity associated with the masslike opacification of the right middle lobe which may represent primary lung cancer and/or postobstructive pneumonia. Metabolically active right hilar and subcarinal lymph node metastasis. Solitary metabolically active skeletal metastasis in the left aspect of the sacrum. Definitive concurrent chemoradiation therapy was started since 8/18/2020 (started RT on 8/18/20 and chemo on 8/19/20). On August 25, 2020, he presented to me for follow-up. I have been following him for metastatic non small cell lung cancer. He is s/p surgery followed by SBRT to his solitary brain metastasis. He is currently on definitive concurrent chemoradiation therapy to his lung lesions since 8/18/20, because he only has oligo metastasis. We decided to give concomitant chemoradiation therapy to his lung lesion, followed by consolidation RT to his left sacrum. After that, I will proceed with first line chemotherapy with pembrolizumab.      He is tolerating concurrent chemoradiation therapy

## 2025-06-10 NOTE — PROGRESS NOTES
Pt. Here for treatment, pt. Reported he hasn't been feeling well, stated he has no energy and hasn't been eating well. Pt. Stated that mediport has not given blood last several treatments. Mediport accessed without difficulty, flushes well, but no blood return noted. Alteplase instilled. Peripheral IV started after several attempts, blood work drawn as ordered. Spoke with Dr. Lolita Francis regarding how pt. Was feeling, stated he wouldn't need to receive treatment due to pt. Has finished radiation and he will be starting immunotherapy soon, new orders received for fluids today and order received for pt. To have port checked prior to next treatment. April RN to schedule port check, Instructed pt. To call if he wasn't feeling any better, pt. Verbalized understanding. Yes

## (undated) DEVICE — Device

## (undated) DEVICE — SCREW BNE L22MM DIA2.4MM CORT S STL ST T8 STARDRV RECESS
Type: IMPLANTABLE DEVICE | Site: WRIST | Status: NON-FUNCTIONAL
Removed: 2020-02-28

## (undated) DEVICE — 3M™ STERI-DRAPE™ U-DRAPE 1015: Brand: STERI-DRAPE™

## (undated) DEVICE — SCREW BNE L22MM DIA2.4MM DST RAD VOLAR S STL ST VAR ANG LOK
Type: IMPLANTABLE DEVICE | Site: WRIST | Status: FUNCTIONAL
Removed: 2020-02-28

## (undated) DEVICE — SOLUTION IV IRRIG WATER 1000ML POUR BRL 2F7114

## (undated) DEVICE — BANDAGE,ELASTIC,ESMARK,STERILE,4"X9',LF: Brand: MEDLINE

## (undated) DEVICE — TUBING SUCT 12FR MAL ALUM SHFT FN CAP VENT UNIV CONN W/ OBT

## (undated) DEVICE — SPONGE LAP W18XL18IN WHT COT 4 PLY FLD STRUNG RADPQ DISP ST

## (undated) DEVICE — YANKAUER,FLEXIBLE HANDLE,REGLR CAPACITY: Brand: MEDLINE INDUSTRIES, INC.

## (undated) DEVICE — SOLUTION IV 1000ML 0.9% SOD CHL FOR IRRIG PLAS CONT

## (undated) DEVICE — TUBING, SUCTION, 9/32" X 10', STRAIGHT: Brand: MEDLINE

## (undated) DEVICE — SUTURE VCRL SZ 3-0 L18IN ABSRB UD L26MM SH 1/2 CIR J864D

## (undated) DEVICE — GAUZE,SPONGE,4"X4",16PLY,XRAY,STRL,LF: Brand: MEDLINE

## (undated) DEVICE — LINER,SEMI-RIGID,3000CC,50EA/CS: Brand: MEDLINE

## (undated) DEVICE — INTENDED FOR TISSUE SEPARATION, AND OTHER PROCEDURES THAT REQUIRE A SHARP SURGICAL BLADE TO PUNCTURE OR CUT.: Brand: BARD-PARKER ® STAINLESS STEEL BLADES

## (undated) DEVICE — PENCIL ES CRD L10FT HND SWCHING ROCK SWCH W/ EDGE COAT BLDE

## (undated) DEVICE — SYRINGE IRRIG 60ML SFT PLIABLE BLB EZ TO GRP 1 HND USE W/

## (undated) DEVICE — ELECTRODE ES AD CRDLSS PT RET REM POLYHESIVE

## (undated) DEVICE — BANDAGE COMPR W4INXL5YD WHT BGE POLY COT M E WRP WV HK AND

## (undated) DEVICE — DRAPE,U/ SHT,SPLIT,PLAS,STERIL: Brand: MEDLINE

## (undated) DEVICE — COVER,C-ARM,41X74: Brand: MEDLINE

## (undated) DEVICE — MARKER SURG SKIN UTIL REGULAR/FINE 2 TIP W/ RUL AND 9 LBL

## (undated) DEVICE — DRESSING,GAUZE,XEROFORM,CURAD,1"X8",ST: Brand: CURAD

## (undated) DEVICE — GLOVE SURG SZ 8 L12IN THK75MIL DK GRN LTX FREE

## (undated) DEVICE — CHLORAPREP 26ML ORANGE

## (undated) DEVICE — STERILE LATEX POWDER-FREE SURGICAL GLOVESWITH NITRILE COATING: Brand: PROTEXIS

## (undated) DEVICE — BIT DRL L110MM DIA1.8MM QUIK CPL CALIB W/O STP REUSE

## (undated) DEVICE — 1010 S-DRAPE TOWEL DRAPE 10/BX: Brand: STERI-DRAPE™

## (undated) DEVICE — SUTURE ETHLN SZ 3-0 L18IN NONABSORBABLE BLK FS-1 L24MM 3/8 663H

## (undated) DEVICE — COTTON UNDERCAST PADDING,CRIMPED FINISH: Brand: WEBRIL

## (undated) DEVICE — BANDAGE COMPR W3INXL5YD WHT BGE POLY COT M E WRP WV HK AND

## (undated) DEVICE — BIT DRL L100MM DIA2MM QUIK CPL W/O STP REUSE

## (undated) DEVICE — TOWEL,OR,DSP,ST,BLUE,STD,6/PK,12PK/CS: Brand: MEDLINE

## (undated) DEVICE — DRAPE SURGICAL HAND PROX AURORA

## (undated) DEVICE — SPONGE GZ W4XL8IN COT WVN 12 PLY

## (undated) DEVICE — PADDING,UNDERCAST,COTTON, 4"X4YD STERILE: Brand: MEDLINE

## (undated) DEVICE — BANDAGE,SELF ADHRNT,COFLEX,4"X5YD,STRL: Brand: COLABEL

## (undated) DEVICE — SINGLE PORT MANIFOLD: Brand: NEPTUNE 2

## (undated) DEVICE — ZIMMER® STERILE DISPOSABLE TOURNIQUET CUFF WITH PLC, DUAL PORT, SINGLE BLADDER, 18 IN. (46 CM)

## (undated) DEVICE — BANDAGE COMPR W4INXL15FT BGE E SGL LAYERED CLP CLSR

## (undated) DEVICE — COUNTER NDL 30 COUNT FOAM STRP SGL MAG